# Patient Record
(demographics unavailable — no encounter records)

---

## 2024-10-07 NOTE — REVIEW OF SYSTEMS
[Fatigue] : fatigue [Constipation] : constipation [Diarrhea: Grade 0] : Diarrhea: Grade 0 [Negative] : Allergic/Immunologic [FreeTextEntry7] : improves with fruits [Easy Bruising] : a tendency for easy bruising [Fever] : no fever [Chills] : no chills [Abdominal Pain] : no abdominal pain [Vomiting] : no vomiting

## 2024-10-07 NOTE — HISTORY OF PRESENT ILLNESS
[Disease: _____________________] : Disease: [unfilled] [de-identified] : 5/2024-Patient presented to [facility] with elevated LFTs. At that time, she also noted progressively darkening urine and pale stools for one week along with postprandial epigastric pain. 5/10/2024-Abdominal ultrasound-moderate intrahepatic and extrahepatic biliary dilatation.  Abnormal appearing gallbladder.  Abnormal soft tissue density within the distal portion of the common bile duct and at the ze hepatitis.  The constellation of findings is most concerning for possible gallbladder neoplasm with extension to the ze habitus and associated biliary obstruction.  5/11/2024-CT abdomen/pelvis-gallbladder mass and soft tissue within the ze hepatis with enlarged portal caval node.  Soft tissue involvement of the biliary ducts and vasculature.  No evidence of metastatic disease within the chest. 5/11/2024 MR/MRCP-there is an approximately 2.2 cm obstructing mass centered at the bifurcation of the common hepatic duct with intrahepatic biliary duct dilatation, highly suspicious for cholangiocarcinoma (Klatskin tumor).  Masslike appearance of the gallbladder fundus with cystic changes measuring approximately 3.3 cm, which could represent either masslike adenoma I will mitosis versus a separate gallbladder mass.  No evidence of metastatic disease within the abdomen.  5/14/2024-EUS/ERCP 5/14/2024 (Dr. Dangelo) - proximal biliary mass without involvement of the portal vein, large ze hepatic LAD & gallbladder lesion noted - ERCP notable for hilar stricture s/p sphincterotomy, dilation of the stricture to 6 mm, brushing, biopsy, cholangioscopy & PLASTIC stent placed. *repeat in 3 months for stent removal/exchange if no sx done - cholangioscopy notable for abnormal mucosa of the proximal bile duct w/ narrowing and nodularity w/ decreased vascular pattern s/p spy bite bx ** biopsy of CBD structure c/w carcinoma, favor adeno Common bile duct stricture biopsy-small clusters of atypical cells, detached were within stroma, compatible with carcinoma and favor adenocarcinoma. Bile duct mass biopsy-minute fragments of fibrous tissue with marked crush artifact and rare, atypical cells singly or in small clusters.  5/28/2024-PET/CT scan- FDG-avid soft tissue within the ze hepatis, surrounding common bile duct stent, extending into gallbladder/gallbladder fossa, corresponds to known malignancy. FDG-avid soft tissue nodule in right parotid gland is nonspecific. Differential diagnosis includes benign and malignant salivary gland neoplasms and an intraparotid lymph node. Further evaluation may be performed with ultrasound and percutaneous needle biopsy. Indeterminate FDG-avid focus in fundus of uterus. Pelvic ultrasound/pelvic MRI may be obtained for further evaluation.  6/10/2024-Started neoadjuvant Gemcitabine/Cisplatin/Durvalumab.  Course complicated by biliary stent issues, Klebsiella bacteremia (7/1/2024).  7/2/2024-CT abdomen/pelvis-IMPRESSION: Adequate positioning of the biliary stent, however there is no  pneumobilia to suggest patency. Mild intrahepatic biliary ductal  dilatation. New from 6/4/2024 are ill-defined hypodensities scattered throughout the  bilateral hepatic lobes, suggestive of metastatic disease.  7/3/24-MRI Abdomen-IMPRESSION: 1.  Focal stricture of the common hepatic duct/common biliary duct  measuring 1.6 cm with progressive enhancement, consistent with  cholangiocarcinoma. CBD stent traverse through the focal stricture. Mild  intra and extrahepatic biliary ductal dilatation. 2.  Complex solid and cystic lesion in the gallbladder fossa with areas  of thickened septations/nodular enhancement. 3.  Multiple subcentimeter T2 hyperintense foci throughout the hepatic  parenchyma and few of which in the right hepatic lobe demonstrating  peripheral rim enhancement. Although nonspecific, these are suspicious  for microabscesses.  9/4/2024-MRI Abdomen-Resolution of right hepatic lesions, in keeping with infection. Bile duct mass, with associated common hepatic duct narrowing, without change. Slightly increased CBD dilatation with distal tapering. Unchanged intrahepatic biliary duct dilatation. Fundal gallbladder mass, slightly decreased in size.  9/17/14-Final Diagnosis: SALIVARY GLAND, PAROTID, RIGHT INFERIOR, US GUIDED FNA NON DIAGNOSTIC. Benign salivary gland tissue only Cytology slides show non-neoplastic salivary gland acini with few ductal [de-identified] : Adenocarcinoma [de-identified] : 5/21/2024-CA 19-9=290 [de-identified] : 7/15/2024-[facility] Liquid biopsy-ZHANE, TMB=1 Muts/Mb. ARID1A and TET2 mutations.  No  diagnostic alterations for [employer] liquid CDX were detected. [de-identified] : Feels well overall. Denies chills, fevers.  She stays active.  She saw GI, stents were replaced (every 2-months). Wants to consider getting a mediport due to difficulty with peripheral IVs. No c/o N/V/D, fevers.  Accompanied by

## 2024-10-07 NOTE — ASSESSMENT
[FreeTextEntry1] : Lab work reviewed.  CBC notable for neutropenia-treatment HELD today, 10/7/24 #1) GB carcinoma-T4N1(Stage IIIC) clinically. 5/28/2024-PET/CT scan-. FDG-avid soft tissue within the ze hepatis, surrounding common bile duct stent, extending into gallbladder/gallbladder fossa, corresponds to known malignancy. FDG-avid soft tissue nodule in right parotid gland is nonspecific. Differential diagnosis includes benign and malignant salivary gland neoplasms and an intraparotid lymph node. Further evaluation may be performed with ultrasound and percutaneous needle biopsy. Indeterminate FDG-avid focus in fundus of uterus. Pelvic ultrasound/pelvic MRI may be obtained for further evaluation. -**Requested [facility], PD-L1 on pathology-insufficient tissue. 7/15/2024-[facility] Liquid biopsy-ZHANE, TMB=1 Muts/Mb. ARID1A and TET2 mutations.  No  diagnostic alterations for [employer] liquid CDX were detected. --had reviewed roles of surgery/radiation/systemic therapy in biliary tract cancers.-requires a multidisciplinary approach.  5/30/2024-Had discussed case with surgeon Dr. Allison.  He recommended neoadjuvant systemic therapy with interval follow-up imaging at 2 months, and pending this, again at 4 months for surgical decisions. Pt had recent f/u with Dr. Allison, will discuss role of surgery now vs continuing with systemic treatment with Dr. Gracia  6/10/2024-Started neoadjuvant Gemcitabine/Cisplatin/Durvalumab.  Course complicated by biliary stent issues, Klebsiella bacteremia. Following treatment of infection, resumed treatment.  With creatinine improved, resumed cisplatin.  Needed to further elucidate liver lesions: 7/2/2024-CT abdomen/pelvis-IMPRESSION: Adequate positioning of the biliary stent, however there is no  pneumobilia to suggest patency. Mild intrahepatic biliary ductal  dilatation. New from 6/4/2024 are ill-defined hypodensities scattered throughout the  bilateral hepatic lobes, suggestive of metastatic disease.  7/3/24-MRI Abdomen-IMPRESSION: 1.  Focal stricture of the common hepatic duct/common biliary duct  measuring 1.6 cm with progressive enhancement, consistent with  cholangiocarcinoma. CBD stent traverse through the focal stricture. Mild  intra and extrahepatic biliary ductal dilatation. 2.  Complex solid and cystic lesion in the gallbladder fossa with areas  of thickened septations/nodular enhancement. 3.  Multiple subcentimeter T2 hyperintense foci throughout the hepatic  parenchyma and few of which in the right hepatic lobe demonstrating  peripheral rim enhancement. Although nonspecific, these are suspicious  for microabscesses.  --?Micro abscesses vs. mets 7/18/2024-MRI Abdomen-1.  Several small right hepatic lobe liver lesions are without significant change from 7/3/2024. While favored to be inflammatory from prior cholangitis (improvement on imaging can lag behind clinical improvement), metastatic disease remains possible. Follow-up MRI abdomen recommended in 6-8 weeks. 2.  Bile duct mass and gallbladder mass are without significant change. 3.  Iron deposition within the liver. 9/4/2024-MRI Abdomen-Resolution of right hepatic lesions, in keeping with infection. Bile duct mass, with associated common hepatic duct narrowing, without change. Slightly increased CBD dilatation with distal tapering. Unchanged intrahepatic biliary duct dilatation. Fundal gallbladder mass, slightly decreased in size.  --chemotherapy-induced neutropenia: . HOLD treatment today.   --will plan for 3-days of zarxio injection, first dose today, 10/7-10/9. --reschedule gemzar/cisplatin C6 D8 to Friday, 10/11/24 --will continue with additional cycles (up to cycle #8) of her current regimen and re-evaluate with imaging. She will then follow up with surgical oncologist, Dr. Allison to discuss plans for surgery. --patient aware to have biliary stent changed 1-2 months per GI-last 10/2024.  #2) FDG-avid soft tissue nodule in right parotid gland nonspecific on PET/CT scan.  F/U right parotid US 7/30/2024-The right parotid gland is normal in size and echogenicity. Again noted is 8 x 7 x 5 mm heterogeneous predominantly isoechoic nodularity in the superficial midportion of the parotid gland, unchanged. Findings are nonspecific. Consider follow up ultrasound in 6 months. -s/p right parotid FNA on 9/17/14 with ENT MD-Final Diagnosis:Benign salivary gland tissue only  #3) indeterminate FDG-avid focus in fundus of uterus on PET/CT scan Pelvic US 7/30/2024-Leiomyomatous uterus. Subcentimeter bilateral simple ovarian cyst. Trace fundal endometrial fluid. --states saw GYN MD 1/2024-will do yearly f/u as planned. Discussed again today to f/u with GYN  #4) HCM: -Last mammogram [id number] 2- benign findings-due, following GYN-pt will f/u. Again, discussed today. -Osteoporosis-BDT 5/2023-following endocrinologist-taking Ca+/vitamin D weekly per pt. weight-bearing exercise encouraged.  Patient was given the opportunity to ask questions. Her questions have been answered to her apparent satisfaction at this time. She expressed her understanding and willingness to comply with recommended f/u.  -->Gemcitabine 1000 mg/m IV days 1 and 8, along with Cisplatin 25 mg/m IV days 1 and 8.  Cycle is to be every 21 days. Durvalumab 1500 mg IV day 1 q. 21 days with chemotherapy. Cycle # 6  Day # 8 HELD today. Zarxio injections 10/8, 10/9. RTO on 10/11/24 for C6D8.

## 2024-10-07 NOTE — HISTORY OF PRESENT ILLNESS
[Disease: _____________________] : Disease: [unfilled] [de-identified] : 5/2024-Patient presented to [facility] with elevated LFTs. At that time, she also noted progressively darkening urine and pale stools for one week along with postprandial epigastric pain. 5/10/2024-Abdominal ultrasound-moderate intrahepatic and extrahepatic biliary dilatation.  Abnormal appearing gallbladder.  Abnormal soft tissue density within the distal portion of the common bile duct and at the ze hepatitis.  The constellation of findings is most concerning for possible gallbladder neoplasm with extension to the ze habitus and associated biliary obstruction.  5/11/2024-CT abdomen/pelvis-gallbladder mass and soft tissue within the ze hepatis with enlarged portal caval node.  Soft tissue involvement of the biliary ducts and vasculature.  No evidence of metastatic disease within the chest. 5/11/2024 MR/MRCP-there is an approximately 2.2 cm obstructing mass centered at the bifurcation of the common hepatic duct with intrahepatic biliary duct dilatation, highly suspicious for cholangiocarcinoma (Klatskin tumor).  Masslike appearance of the gallbladder fundus with cystic changes measuring approximately 3.3 cm, which could represent either masslike adenoma I will mitosis versus a separate gallbladder mass.  No evidence of metastatic disease within the abdomen.  5/14/2024-EUS/ERCP 5/14/2024 (Dr. Dangelo) - proximal biliary mass without involvement of the portal vein, large ze hepatic LAD & gallbladder lesion noted - ERCP notable for hilar stricture s/p sphincterotomy, dilation of the stricture to 6 mm, brushing, biopsy, cholangioscopy & PLASTIC stent placed. *repeat in 3 months for stent removal/exchange if no sx done - cholangioscopy notable for abnormal mucosa of the proximal bile duct w/ narrowing and nodularity w/ decreased vascular pattern s/p spy bite bx ** biopsy of CBD structure c/w carcinoma, favor adeno Common bile duct stricture biopsy-small clusters of atypical cells, detached were within stroma, compatible with carcinoma and favor adenocarcinoma. Bile duct mass biopsy-minute fragments of fibrous tissue with marked crush artifact and rare, atypical cells singly or in small clusters.  5/28/2024-PET/CT scan- FDG-avid soft tissue within the ze hepatis, surrounding common bile duct stent, extending into gallbladder/gallbladder fossa, corresponds to known malignancy. FDG-avid soft tissue nodule in right parotid gland is nonspecific. Differential diagnosis includes benign and malignant salivary gland neoplasms and an intraparotid lymph node. Further evaluation may be performed with ultrasound and percutaneous needle biopsy. Indeterminate FDG-avid focus in fundus of uterus. Pelvic ultrasound/pelvic MRI may be obtained for further evaluation.  6/10/2024-Started neoadjuvant Gemcitabine/Cisplatin/Durvalumab.  Course complicated by biliary stent issues, Klebsiella bacteremia (7/1/2024).  7/2/2024-CT abdomen/pelvis-IMPRESSION: Adequate positioning of the biliary stent, however there is no  pneumobilia to suggest patency. Mild intrahepatic biliary ductal  dilatation. New from 6/4/2024 are ill-defined hypodensities scattered throughout the  bilateral hepatic lobes, suggestive of metastatic disease.  7/3/24-MRI Abdomen-IMPRESSION: 1.  Focal stricture of the common hepatic duct/common biliary duct  measuring 1.6 cm with progressive enhancement, consistent with  cholangiocarcinoma. CBD stent traverse through the focal stricture. Mild  intra and extrahepatic biliary ductal dilatation. 2.  Complex solid and cystic lesion in the gallbladder fossa with areas  of thickened septations/nodular enhancement. 3.  Multiple subcentimeter T2 hyperintense foci throughout the hepatic  parenchyma and few of which in the right hepatic lobe demonstrating  peripheral rim enhancement. Although nonspecific, these are suspicious  for microabscesses.  9/4/2024-MRI Abdomen-Resolution of right hepatic lesions, in keeping with infection. Bile duct mass, with associated common hepatic duct narrowing, without change. Slightly increased CBD dilatation with distal tapering. Unchanged intrahepatic biliary duct dilatation. Fundal gallbladder mass, slightly decreased in size.  9/17/14-Final Diagnosis: SALIVARY GLAND, PAROTID, RIGHT INFERIOR, US GUIDED FNA NON DIAGNOSTIC. Benign salivary gland tissue only Cytology slides show non-neoplastic salivary gland acini with few ductal [de-identified] : Adenocarcinoma [de-identified] : 5/21/2024-CA 19-9=290 [de-identified] : 7/15/2024-[employer] Liquid biopsy-ZHANE, TMB=1 Muts/Mb. ARID1A and TET2 mutations.  No  diagnostic alterations for [employer] liquid CDX were detected. [de-identified] : Feels well overall. Denies chills, fevers.  She stays active.  She saw GI, stents were replaced (every 2-months). Wants to consider getting a mediport due to difficulty with peripheral IVs. No c/o N/V/D, fevers.  Accompanied by

## 2024-10-07 NOTE — ASSESSMENT
[FreeTextEntry1] : Lab work reviewed.  CBC notable for neutropenia-treatment HELD today, 10/7/24 #1) GB carcinoma-T4N1(Stage IIIC) clinically. 5/28/2024-PET/CT scan-. FDG-avid soft tissue within the ze hepatis, surrounding common bile duct stent, extending into gallbladder/gallbladder fossa, corresponds to known malignancy. FDG-avid soft tissue nodule in right parotid gland is nonspecific. Differential diagnosis includes benign and malignant salivary gland neoplasms and an intraparotid lymph node. Further evaluation may be performed with ultrasound and percutaneous needle biopsy. Indeterminate FDG-avid focus in fundus of uterus. Pelvic ultrasound/pelvic MRI may be obtained for further evaluation. -**Requested [employer], PD-L1 on pathology-insufficient tissue. 7/15/2024-[facility] Liquid biopsy-ZHANE, TMB=1 Muts/Mb. ARID1A and TET2 mutations.  No  diagnostic alterations for [employer] liquid CDX were detected. --had reviewed roles of surgery/radiation/systemic therapy in biliary tract cancers.-requires a multidisciplinary approach.  5/30/2024-Had discussed case with surgeon Dr. Allison.  He recommended neoadjuvant systemic therapy with interval follow-up imaging at 2 months, and pending this, again at 4 months for surgical decisions. Pt had recent f/u with Dr. Allison, will discuss role of surgery now vs continuing with systemic treatment with Dr. Gracia  6/10/2024-Started neoadjuvant Gemcitabine/Cisplatin/Durvalumab.  Course complicated by biliary stent issues, Klebsiella bacteremia. Following treatment of infection, resumed treatment.  With creatinine improved, resumed cisplatin.  Needed to further elucidate liver lesions: 7/2/2024-CT abdomen/pelvis-IMPRESSION: Adequate positioning of the biliary stent, however there is no  pneumobilia to suggest patency. Mild intrahepatic biliary ductal  dilatation. New from 6/4/2024 are ill-defined hypodensities scattered throughout the  bilateral hepatic lobes, suggestive of metastatic disease.  7/3/24-MRI Abdomen-IMPRESSION: 1.  Focal stricture of the common hepatic duct/common biliary duct  measuring 1.6 cm with progressive enhancement, consistent with  cholangiocarcinoma. CBD stent traverse through the focal stricture. Mild  intra and extrahepatic biliary ductal dilatation. 2.  Complex solid and cystic lesion in the gallbladder fossa with areas  of thickened septations/nodular enhancement. 3.  Multiple subcentimeter T2 hyperintense foci throughout the hepatic  parenchyma and few of which in the right hepatic lobe demonstrating  peripheral rim enhancement. Although nonspecific, these are suspicious  for microabscesses.  --?Micro abscesses vs. mets 7/18/2024-MRI Abdomen-1.  Several small right hepatic lobe liver lesions are without significant change from 7/3/2024. While favored to be inflammatory from prior cholangitis (improvement on imaging can lag behind clinical improvement), metastatic disease remains possible. Follow-up MRI abdomen recommended in 6-8 weeks. 2.  Bile duct mass and gallbladder mass are without significant change. 3.  Iron deposition within the liver. 9/4/2024-MRI Abdomen-Resolution of right hepatic lesions, in keeping with infection. Bile duct mass, with associated common hepatic duct narrowing, without change. Slightly increased CBD dilatation with distal tapering. Unchanged intrahepatic biliary duct dilatation. Fundal gallbladder mass, slightly decreased in size.  --chemotherapy-induced neutropenia: . HOLD treatment today.   --will plan for 3-days of zarxio injection, first dose today, 10/7-10/9. --reschedule gemzar/cisplatin C6 D8 to Friday, 10/11/24 --will continue with additional cycles (up to cycle #8) of her current regimen and re-evaluate with imaging. She will then follow up with surgical oncologist, Dr. Allison to discuss plans for surgery. --patient aware to have biliary stent changed 1-2 months per GI-last 10/2024.  #2) FDG-avid soft tissue nodule in right parotid gland nonspecific on PET/CT scan.  F/U right parotid US 7/30/2024-The right parotid gland is normal in size and echogenicity. Again noted is 8 x 7 x 5 mm heterogeneous predominantly isoechoic nodularity in the superficial midportion of the parotid gland, unchanged. Findings are nonspecific. Consider follow up ultrasound in 6 months. -s/p right parotid FNA on 9/17/14 with ENT MD-Final Diagnosis:Benign salivary gland tissue only  #3) indeterminate FDG-avid focus in fundus of uterus on PET/CT scan Pelvic US 7/30/2024-Leiomyomatous uterus. Subcentimeter bilateral simple ovarian cyst. Trace fundal endometrial fluid. --states saw GYN MD 1/2024-will do yearly f/u as planned. Discussed again today to f/u with GYN  #4) HCM: -Last mammogram [id number] 2- benign findings-due, following GYN-pt will f/u. Again, discussed today. -Osteoporosis-BDT 5/2023-following endocrinologist-taking Ca+/vitamin D weekly per pt. weight-bearing exercise encouraged.  Patient was given the opportunity to ask questions. Her questions have been answered to her apparent satisfaction at this time. She expressed her understanding and willingness to comply with recommended f/u.  -->Gemcitabine 1000 mg/m IV days 1 and 8, along with Cisplatin 25 mg/m IV days 1 and 8.  Cycle is to be every 21 days. Durvalumab 1500 mg IV day 1 q. 21 days with chemotherapy. Cycle # 6  Day # 8 HELD today. Zarxio injections 10/8, 10/9. RTO on 10/11/24 for C6D8.

## 2024-10-11 NOTE — ASSESSMENT
[FreeTextEntry1] : Lab work reviewed: 10/11/24-discussed in detail with patient.  #1) GB carcinoma-T4N1(Stage IIIC) clinically. 5/28/2024-PET/CT scan-. FDG-avid soft tissue within the ze hepatis, surrounding common bile duct stent, extending into gallbladder/gallbladder fossa, corresponds to known malignancy. FDG-avid soft tissue nodule in right parotid gland is nonspecific. Differential diagnosis includes benign and malignant salivary gland neoplasms and an intraparotid lymph node. Further evaluation may be performed with ultrasound and percutaneous needle biopsy. Indeterminate FDG-avid focus in fundus of uterus. Pelvic ultrasound/pelvic MRI may be obtained for further evaluation. -**Requested [facility], PD-L1 on pathology-insufficient tissue. 7/15/2024-[facility] Liquid biopsy-ZHANE, TMB=1 Muts/Mb. ARID1A and TET2 mutations.  No  diagnostic alterations for [facility] liquid CDX were detected. --had reviewed roles of surgery/radiation/systemic therapy in biliary tract cancers.-requires a multidisciplinary approach.  5/30/2024-Had discussed case with surgeon Dr. Allison.  He recommended neoadjuvant systemic therapy with interval follow-up imaging at 2 months, and pending this, again at 4 months for surgical decisions. Pt had recent f/u with Dr. Allison, will discuss role of surgery now vs continuing with systemic treatment with Dr. Gracia  6/10/2024-Started neoadjuvant Gemcitabine/Cisplatin/Durvalumab.  Course complicated by biliary stent issues, Klebsiella bacteremia. Following treatment of infection, resumed treatment.  With creatinine improved, resumed cisplatin.  Needed to further elucidate liver lesions: 7/2/2024-CT abdomen/pelvis-IMPRESSION: Adequate positioning of the biliary stent, however there is no  pneumobilia to suggest patency. Mild intrahepatic biliary ductal  dilatation. New from 6/4/2024 are ill-defined hypodensities scattered throughout the  bilateral hepatic lobes, suggestive of metastatic disease.  7/3/24-MRI Abdomen-IMPRESSION: 1.  Focal stricture of the common hepatic duct/common biliary duct  measuring 1.6 cm with progressive enhancement, consistent with  cholangiocarcinoma. CBD stent traverse through the focal stricture. Mild  intra and extrahepatic biliary ductal dilatation. 2.  Complex solid and cystic lesion in the gallbladder fossa with areas  of thickened septations/nodular enhancement. 3.  Multiple subcentimeter T2 hyperintense foci throughout the hepatic  parenchyma and few of which in the right hepatic lobe demonstrating  peripheral rim enhancement. Although nonspecific, these are suspicious  for microabscesses.  --?Micro abscesses vs. mets 7/18/2024-MRI Abdomen-1.  Several small right hepatic lobe liver lesions are without significant change from 7/3/2024. While favored to be inflammatory from prior cholangitis (improvement on imaging can lag behind clinical improvement), metastatic disease remains possible. Follow-up MRI abdomen recommended in 6-8 weeks. 2.  Bile duct mass and gallbladder mass are without significant change. 3.  Iron deposition within the liver. 9/4/2024-MRI Abdomen-Resolution of right hepatic lesions, in keeping with infection. Bile duct mass, with associated common hepatic duct narrowing, without change. Slightly increased CBD dilatation with distal tapering. Unchanged intrahepatic biliary duct dilatation. Fundal gallbladder mass, slightly decreased in size.  --chemotherapy-induced neutropenia: improved. s/ 3-days of zarxio injection. continue with zarxio injection PRN.  --thrombocytopenia: due for gemzar/cisplatin C6 D8-HOLD today. Defer mediport placement for now.  --will continue with additional cycles (up to cycle #8) of her current regimen and re-evaluate with imaging. She will then follow up with surgical oncologist, Dr. Allison to discuss plans for surgery. --patient aware to have biliary stent changed 1-2 months per GI-last 10/2024.  #2) FDG-avid soft tissue nodule in right parotid gland nonspecific on PET/CT scan.  F/U right parotid US 7/30/2024-The right parotid gland is normal in size and echogenicity. Again noted is 8 x 7 x 5 mm heterogeneous predominantly isoechoic nodularity in the superficial midportion of the parotid gland, unchanged. Findings are nonspecific. Consider follow up ultrasound in 6 months. -s/p right parotid FNA on 9/17/14 with ENT MD-Final Diagnosis:Benign salivary gland tissue only  #3) indeterminate FDG-avid focus in fundus of uterus on PET/CT scan Pelvic US 7/30/2024-Leiomyomatous uterus. Subcentimeter bilateral simple ovarian cyst. Trace fundal endometrial fluid. --states saw GYN MD 1/2024-will do yearly f/u as planned. Discussed again today to f/u with GYN  #4) HCM: -Last mammogram [id number] 2- benign findings-due, following GYN-pt will f/u. Again, discussed today. -Osteoporosis-BDT 5/2023-following endocrinologist-taking Ca+/vitamin D weekly per pt. weight-bearing exercise encouraged.  Patient was given the opportunity to ask questions. Her questions have been answered to her apparent satisfaction at this time. She expressed her understanding and willingness to comply with recommended f/u.  -->Gemcitabine 1000 mg/m IV days 1 and 8, along with Cisplatin 25 mg/m IV days 1 and 8.  Cycle is to be every 21 days. Durvalumab 1500 mg IV day 1 q. 21 days with chemotherapy. Cycle # 6  Day # 8 HELD today. RTO in 2-weeks for C7D1, zarxio injections prn.

## 2024-10-11 NOTE — END OF VISIT
Provider E-Visit time total (minutes): 5  
[Time Spent: ___ minutes] : I have spent [unfilled] minutes of time on the encounter which excludes teaching and separately reported services.

## 2024-10-11 NOTE — HISTORY OF PRESENT ILLNESS
[Disease: _____________________] : Disease: [unfilled] [de-identified] : 5/2024-Patient presented to [facility] with elevated LFTs. At that time, she also noted progressively darkening urine and pale stools for one week along with postprandial epigastric pain. 5/10/2024-Abdominal ultrasound-moderate intrahepatic and extrahepatic biliary dilatation.  Abnormal appearing gallbladder.  Abnormal soft tissue density within the distal portion of the common bile duct and at the ze hepatitis.  The constellation of findings is most concerning for possible gallbladder neoplasm with extension to the ze habitus and associated biliary obstruction.  5/11/2024-CT abdomen/pelvis-gallbladder mass and soft tissue within the ze hepatis with enlarged portal caval node.  Soft tissue involvement of the biliary ducts and vasculature.  No evidence of metastatic disease within the chest. 5/11/2024 MR/MRCP-there is an approximately 2.2 cm obstructing mass centered at the bifurcation of the common hepatic duct with intrahepatic biliary duct dilatation, highly suspicious for cholangiocarcinoma (Klatskin tumor).  Masslike appearance of the gallbladder fundus with cystic changes measuring approximately 3.3 cm, which could represent either masslike adenoma I will mitosis versus a separate gallbladder mass.  No evidence of metastatic disease within the abdomen.  5/14/2024-EUS/ERCP 5/14/2024 (Dr. Dangelo) - proximal biliary mass without involvement of the portal vein, large ze hepatic LAD & gallbladder lesion noted - ERCP notable for hilar stricture s/p sphincterotomy, dilation of the stricture to 6 mm, brushing, biopsy, cholangioscopy & PLASTIC stent placed. *repeat in 3 months for stent removal/exchange if no sx done - cholangioscopy notable for abnormal mucosa of the proximal bile duct w/ narrowing and nodularity w/ decreased vascular pattern s/p spy bite bx ** biopsy of CBD structure c/w carcinoma, favor adeno Common bile duct stricture biopsy-small clusters of atypical cells, detached were within stroma, compatible with carcinoma and favor adenocarcinoma. Bile duct mass biopsy-minute fragments of fibrous tissue with marked crush artifact and rare, atypical cells singly or in small clusters.  5/28/2024-PET/CT scan- FDG-avid soft tissue within the ze hepatis, surrounding common bile duct stent, extending into gallbladder/gallbladder fossa, corresponds to known malignancy. FDG-avid soft tissue nodule in right parotid gland is nonspecific. Differential diagnosis includes benign and malignant salivary gland neoplasms and an intraparotid lymph node. Further evaluation may be performed with ultrasound and percutaneous needle biopsy. Indeterminate FDG-avid focus in fundus of uterus. Pelvic ultrasound/pelvic MRI may be obtained for further evaluation.  6/10/2024-Started neoadjuvant Gemcitabine/Cisplatin/Durvalumab.  Course complicated by biliary stent issues, Klebsiella bacteremia (7/1/2024).  7/2/2024-CT abdomen/pelvis-IMPRESSION: Adequate positioning of the biliary stent, however there is no  pneumobilia to suggest patency. Mild intrahepatic biliary ductal  dilatation. New from 6/4/2024 are ill-defined hypodensities scattered throughout the  bilateral hepatic lobes, suggestive of metastatic disease.  7/3/24-MRI Abdomen-IMPRESSION: 1.  Focal stricture of the common hepatic duct/common biliary duct  measuring 1.6 cm with progressive enhancement, consistent with  cholangiocarcinoma. CBD stent traverse through the focal stricture. Mild  intra and extrahepatic biliary ductal dilatation. 2.  Complex solid and cystic lesion in the gallbladder fossa with areas  of thickened septations/nodular enhancement. 3.  Multiple subcentimeter T2 hyperintense foci throughout the hepatic  parenchyma and few of which in the right hepatic lobe demonstrating  peripheral rim enhancement. Although nonspecific, these are suspicious  for microabscesses.  9/4/2024-MRI Abdomen-Resolution of right hepatic lesions, in keeping with infection. Bile duct mass, with associated common hepatic duct narrowing, without change. Slightly increased CBD dilatation with distal tapering. Unchanged intrahepatic biliary duct dilatation. Fundal gallbladder mass, slightly decreased in size.  9/17/14-Final Diagnosis: SALIVARY GLAND, PAROTID, RIGHT INFERIOR, US GUIDED FNA NON DIAGNOSTIC. Benign salivary gland tissue only Cytology slides show non-neoplastic salivary gland acini with few ductal [de-identified] : Adenocarcinoma [de-identified] : 5/21/2024-CA 19-9=290 [de-identified] : 7/15/2024-[facility] Liquid biopsy-ZHANE, TMB=1 Muts/Mb. ARID1A and TET2 mutations.  No  diagnostic alterations for [employer] liquid CDX were detected. [de-identified] : C6D8 held on Monday due to neutropenia. S/p 3 doses of zarxio injections. Feels well overall and remains active.  Wants to consider getting a mediport due to difficulty with peripheral IVs. +brusing. Denies chills, fevers, night sweats, vomiting, constipation, diarrhea, bleeding.  Accompanied by

## 2024-10-11 NOTE — REVIEW OF SYSTEMS
[Diarrhea: Grade 0] : Diarrhea: Grade 0 [Easy Bruising] : a tendency for easy bruising [Negative] : Allergic/Immunologic [Fever] : no fever [Chills] : no chills [Night Sweats] : no night sweats [Fatigue] : no fatigue [Abdominal Pain] : no abdominal pain [Vomiting] : no vomiting [Easy Bleeding] : no tendency for easy bleeding

## 2024-10-21 NOTE — REASON FOR VISIT
[Initial Consultation] : an initial consultation [Spouse] : spouse [FreeTextEntry2] : GB/cholangiocarcinoma

## 2024-10-21 NOTE — HISTORY OF PRESENT ILLNESS
[Disease: _____________________] : Disease: [unfilled] [de-identified] : 5/2024-Patient presented to [facility] with elevated LFTs. At that time, she also noted progressively darkening urine and pale stools for one week along with postprandial epigastric pain. 5/10/2024-Abdominal ultrasound-moderate intrahepatic and extrahepatic biliary dilatation.  Abnormal appearing gallbladder.  Abnormal soft tissue density within the distal portion of the common bile duct and at the ze hepatitis.  The constellation of findings is most concerning for possible gallbladder neoplasm with extension to the ze habitus and associated biliary obstruction.  5/11/2024-CT abdomen/pelvis-gallbladder mass and soft tissue within the ze hepatis with enlarged portal caval node.  Soft tissue involvement of the biliary ducts and vasculature.  No evidence of metastatic disease within the chest. 5/11/2024 MR/MRCP-there is an approximately 2.2 cm obstructing mass centered at the bifurcation of the common hepatic duct with intrahepatic biliary duct dilatation, highly suspicious for cholangiocarcinoma (Klatskin tumor).  Masslike appearance of the gallbladder fundus with cystic changes measuring approximately 3.3 cm, which could represent either masslike adenoma I will mitosis versus a separate gallbladder mass.  No evidence of metastatic disease within the abdomen.  5/14/2024-EUS/ERCP 5/14/2024 (Dr. Dangelo) - proximal biliary mass without involvement of the portal vein, large ze hepatic LAD & gallbladder lesion noted - ERCP notable for hilar stricture s/p sphincterotomy, dilation of the stricture to 6 mm, brushing, biopsy, cholangioscopy & PLASTIC stent placed. *repeat in 3 months for stent removal/exchange if no sx done - cholangioscopy notable for abnormal mucosa of the proximal bile duct w/ narrowing and nodularity w/ decreased vascular pattern s/p spy bite bx ** biopsy of CBD structure c/w carcinoma, favor adeno Common bile duct stricture biopsy-small clusters of atypical cells, detached were within stroma, compatible with carcinoma and favor adenocarcinoma. Bile duct mass biopsy-minute fragments of fibrous tissue with marked crush artifact and rare, atypical cells singly or in small clusters.  5/28/2024-PET/CT scan- FDG-avid soft tissue within the ze hepatis, surrounding common bile duct stent, extending into gallbladder/gallbladder fossa, corresponds to known malignancy. FDG-avid soft tissue nodule in right parotid gland is nonspecific. Differential diagnosis includes benign and malignant salivary gland neoplasms and an intraparotid lymph node. Further evaluation may be performed with ultrasound and percutaneous needle biopsy. Indeterminate FDG-avid focus in fundus of uterus. Pelvic ultrasound/pelvic MRI may be obtained for further evaluation.  6/10/2024-Started neoadjuvant Gemcitabine/Cisplatin/Durvalumab.  Course complicated by biliary stent issues, Klebsiella bacteremia (7/1/2024).  7/2/2024-CT abdomen/pelvis-IMPRESSION: Adequate positioning of the biliary stent, however there is no  pneumobilia to suggest patency. Mild intrahepatic biliary ductal  dilatation. New from 6/4/2024 are ill-defined hypodensities scattered throughout the  bilateral hepatic lobes, suggestive of metastatic disease.  7/3/24-MRI Abdomen-IMPRESSION: 1.  Focal stricture of the common hepatic duct/common biliary duct  measuring 1.6 cm with progressive enhancement, consistent with  cholangiocarcinoma. CBD stent traverse through the focal stricture. Mild  intra and extrahepatic biliary ductal dilatation. 2.  Complex solid and cystic lesion in the gallbladder fossa with areas  of thickened septations/nodular enhancement. 3.  Multiple subcentimeter T2 hyperintense foci throughout the hepatic  parenchyma and few of which in the right hepatic lobe demonstrating  peripheral rim enhancement. Although nonspecific, these are suspicious  for microabscesses.  9/4/2024-MRI Abdomen-Resolution of right hepatic lesions, in keeping with infection. Bile duct mass, with associated common hepatic duct narrowing, without change. Slightly increased CBD dilatation with distal tapering. Unchanged intrahepatic biliary duct dilatation. Fundal gallbladder mass, slightly decreased in size.  [de-identified] : Adenocarcinoma [de-identified] : 5/21/2024-CA 19-9=290 [de-identified] : 7/15/2024-[facility] Liquid biopsy-ZHANE, TMB=1 Muts/Mb. ARID1A and TET2 mutations.  No  diagnostic alterations for [facility] liquid CDX were detected. [de-identified] : Feels generally well. No c/o N/V/D, fevers. No c/o abdominal pain.

## 2024-10-21 NOTE — ASSESSMENT
[FreeTextEntry1] : Lab work , 9/30/24 surgery note, 9/30/24 ENT MD note reviewed. #1) GB carcinoma-T4N1(Stage IIIC) clinically. 5/28/2024-PET/CT scan-. FDG-avid soft tissue within the ze hepatis, surrounding common bile duct stent, extending into gallbladder/gallbladder fossa, corresponds to known malignancy. FDG-avid soft tissue nodule in right parotid gland is nonspecific. Differential diagnosis includes benign and malignant salivary gland neoplasms and an intraparotid lymph node. Further evaluation may be performed with ultrasound and percutaneous needle biopsy. Indeterminate FDG-avid focus in fundus of uterus. Pelvic ultrasound/pelvic MRI may be obtained for further evaluation. -**Requested [facility], PD-L1 on pathology-insufficient tissue. 7/15/2024-[facility] Liquid biopsy-ZHANE, TMB=1 Muts/Mb. ARID1A and TET2 mutations.  No  diagnostic alterations for [facility] liquid CDX were detected. --had reviewed roles of surgery/radiation/systemic therapy in biliary tract cancers.-requires a multidisciplinary approach.  5/30/2024-Had discussed case with surgeon Dr. Allison.  He recommended neoadjuvant systemic therapy with interval follow-up imaging at 2 months, and pending this, again at 4 months for surgical decisions.  6/10/2024-Started neoadjuvant Gemcitabine/Cisplatin/Durvalumab.  Course complicated by biliary stent issues, Klebsiella bacteremia. Following treatment of infection, resumed treatment.  With creatinine improved, resumed cisplatin.  Needed to further elucidate liver lesions: 7/2/2024-CT abdomen/pelvis-IMPRESSION: Adequate positioning of the biliary stent, however there is no  pneumobilia to suggest patency. Mild intrahepatic biliary ductal  dilatation. New from 6/4/2024 are ill-defined hypodensities scattered throughout the  bilateral hepatic lobes, suggestive of metastatic disease.  7/3/24-MRI Abdomen-IMPRESSION: 1.  Focal stricture of the common hepatic duct/common biliary duct  measuring 1.6 cm with progressive enhancement, consistent with  cholangiocarcinoma. CBD stent traverse through the focal stricture. Mild  intra and extrahepatic biliary ductal dilatation. 2.  Complex solid and cystic lesion in the gallbladder fossa with areas  of thickened septations/nodular enhancement. 3.  Multiple subcentimeter T2 hyperintense foci throughout the hepatic  parenchyma and few of which in the right hepatic lobe demonstrating  peripheral rim enhancement. Although nonspecific, these are suspicious  for microabscesses.  --?Micro abscesses vs. mets 7/18/2024-MRI Abdomen-1.  Several small right hepatic lobe liver lesions are without significant change from 7/3/2024. While favored to be inflammatory from prior cholangitis (improvement on imaging can lag behind clinical improvement), metastatic disease remains possible. Follow-up MRI abdomen recommended in 6-8 weeks. 2.  Bile duct mass and gallbladder mass are without significant change. 3.  Iron deposition within the liver. 9/4/2024-MRI Abdomen-Resolution of right hepatic lesions, in keeping with infection. Bile duct mass, with associated common hepatic duct narrowing, without change. Slightly increased CBD dilatation with distal tapering. Unchanged intrahepatic biliary duct dilatation. Fundal gallbladder mass, slightly decreased in size.  --clinically stable for treatment today to which patient consents --CT A/P ordered by surgeon-pending --patient aware to have biliary stent changed 1-2 months per GI-last 8/20/2024.  #2) FDG-avid soft tissue nodule in right parotid gland nonspecific on PET/CT scan.  F/U right parotid US 7/30/2024-The right parotid gland is normal in size and echogenicity. Again noted is 8 x 7 x 5 mm heterogeneous predominantly isoechoic nodularity in the superficial midportion of the parotid gland, unchanged. Findings are nonspecific. Consider follow up ultrasound in 6 months. 9/30/2024-SALIVARY GLAND, PAROTID, RIGHT INFERIOR, US GUIDED FNA NON DIAGNOSTIC. Benign salivary gland tissue only ---to continue f/u with ENT MD   #3) indeterminate FDG-avid focus in fundus of uterus on PET/CT scan Pelvic US 7/30/2024-Leiomyomatous uterus. Subcentimeter bilateral simple ovarian cyst. Trace fundal endometrial fluid. --stated saw GYN MD 1/2024-will do yearly f/u as planned  Patient was given the opportunity to ask questions. Her questions have been answered to her apparent satisfaction at this time. She expressed her understanding and willingness to comply with recommended f/u.  -->Gemcitabine 1000 mg/m IV days 1 and 8, along with Cisplatin 25 mg/m IV days 1 and 8.  Cycle is to be every 21 days. Durvalumab 1500 mg IV day 1 q. 21 days with chemotherapy. Cycle #7  Day #  1 chemo today. RTO  1  week. Zarxio x 2 days post chemo.

## 2024-10-21 NOTE — HISTORY OF PRESENT ILLNESS
[Disease: _____________________] : Disease: [unfilled] [de-identified] : 5/2024-Patient presented to [facility] with elevated LFTs. At that time, she also noted progressively darkening urine and pale stools for one week along with postprandial epigastric pain. 5/10/2024-Abdominal ultrasound-moderate intrahepatic and extrahepatic biliary dilatation.  Abnormal appearing gallbladder.  Abnormal soft tissue density within the distal portion of the common bile duct and at the ze hepatitis.  The constellation of findings is most concerning for possible gallbladder neoplasm with extension to the ze habitus and associated biliary obstruction.  5/11/2024-CT abdomen/pelvis-gallbladder mass and soft tissue within the ze hepatis with enlarged portal caval node.  Soft tissue involvement of the biliary ducts and vasculature.  No evidence of metastatic disease within the chest. 5/11/2024 MR/MRCP-there is an approximately 2.2 cm obstructing mass centered at the bifurcation of the common hepatic duct with intrahepatic biliary duct dilatation, highly suspicious for cholangiocarcinoma (Klatskin tumor).  Masslike appearance of the gallbladder fundus with cystic changes measuring approximately 3.3 cm, which could represent either masslike adenoma I will mitosis versus a separate gallbladder mass.  No evidence of metastatic disease within the abdomen.  5/14/2024-EUS/ERCP 5/14/2024 (Dr. Dangelo) - proximal biliary mass without involvement of the portal vein, large ze hepatic LAD & gallbladder lesion noted - ERCP notable for hilar stricture s/p sphincterotomy, dilation of the stricture to 6 mm, brushing, biopsy, cholangioscopy & PLASTIC stent placed. *repeat in 3 months for stent removal/exchange if no sx done - cholangioscopy notable for abnormal mucosa of the proximal bile duct w/ narrowing and nodularity w/ decreased vascular pattern s/p spy bite bx ** biopsy of CBD structure c/w carcinoma, favor adeno Common bile duct stricture biopsy-small clusters of atypical cells, detached were within stroma, compatible with carcinoma and favor adenocarcinoma. Bile duct mass biopsy-minute fragments of fibrous tissue with marked crush artifact and rare, atypical cells singly or in small clusters.  5/28/2024-PET/CT scan- FDG-avid soft tissue within the ze hepatis, surrounding common bile duct stent, extending into gallbladder/gallbladder fossa, corresponds to known malignancy. FDG-avid soft tissue nodule in right parotid gland is nonspecific. Differential diagnosis includes benign and malignant salivary gland neoplasms and an intraparotid lymph node. Further evaluation may be performed with ultrasound and percutaneous needle biopsy. Indeterminate FDG-avid focus in fundus of uterus. Pelvic ultrasound/pelvic MRI may be obtained for further evaluation.  6/10/2024-Started neoadjuvant Gemcitabine/Cisplatin/Durvalumab.  Course complicated by biliary stent issues, Klebsiella bacteremia (7/1/2024).  7/2/2024-CT abdomen/pelvis-IMPRESSION: Adequate positioning of the biliary stent, however there is no  pneumobilia to suggest patency. Mild intrahepatic biliary ductal  dilatation. New from 6/4/2024 are ill-defined hypodensities scattered throughout the  bilateral hepatic lobes, suggestive of metastatic disease.  7/3/24-MRI Abdomen-IMPRESSION: 1.  Focal stricture of the common hepatic duct/common biliary duct  measuring 1.6 cm with progressive enhancement, consistent with  cholangiocarcinoma. CBD stent traverse through the focal stricture. Mild  intra and extrahepatic biliary ductal dilatation. 2.  Complex solid and cystic lesion in the gallbladder fossa with areas  of thickened septations/nodular enhancement. 3.  Multiple subcentimeter T2 hyperintense foci throughout the hepatic  parenchyma and few of which in the right hepatic lobe demonstrating  peripheral rim enhancement. Although nonspecific, these are suspicious  for microabscesses.  9/4/2024-MRI Abdomen-Resolution of right hepatic lesions, in keeping with infection. Bile duct mass, with associated common hepatic duct narrowing, without change. Slightly increased CBD dilatation with distal tapering. Unchanged intrahepatic biliary duct dilatation. Fundal gallbladder mass, slightly decreased in size.  [de-identified] : Adenocarcinoma [de-identified] : 5/21/2024-CA 19-9=290 [de-identified] : 7/15/2024-[facility] Liquid biopsy-ZHANE, TMB=1 Muts/Mb. ARID1A and TET2 mutations.  No  diagnostic alterations for [employer] liquid CDX were detected. [de-identified] : Feels generally well. No c/o N/V/D, fevers. No c/o abdominal pain.

## 2024-10-28 NOTE — HISTORY OF PRESENT ILLNESS
[Disease: _____________________] : Disease: [unfilled] [de-identified] : 5/2024-Patient presented to [facility] with elevated LFTs. At that time, she also noted progressively darkening urine and pale stools for one week along with postprandial epigastric pain. 5/10/2024-Abdominal ultrasound-moderate intrahepatic and extrahepatic biliary dilatation.  Abnormal appearing gallbladder.  Abnormal soft tissue density within the distal portion of the common bile duct and at the ze hepatitis.  The constellation of findings is most concerning for possible gallbladder neoplasm with extension to the ze habitus and associated biliary obstruction.  5/11/2024-CT abdomen/pelvis-gallbladder mass and soft tissue within the ze hepatis with enlarged portal caval node.  Soft tissue involvement of the biliary ducts and vasculature.  No evidence of metastatic disease within the chest. 5/11/2024 MR/MRCP-there is an approximately 2.2 cm obstructing mass centered at the bifurcation of the common hepatic duct with intrahepatic biliary duct dilatation, highly suspicious for cholangiocarcinoma (Klatskin tumor).  Masslike appearance of the gallbladder fundus with cystic changes measuring approximately 3.3 cm, which could represent either masslike adenoma I will mitosis versus a separate gallbladder mass.  No evidence of metastatic disease within the abdomen.  5/14/2024-EUS/ERCP 5/14/2024 (Dr. Dangelo) - proximal biliary mass without involvement of the portal vein, large ze hepatic LAD & gallbladder lesion noted - ERCP notable for hilar stricture s/p sphincterotomy, dilation of the stricture to 6 mm, brushing, biopsy, cholangioscopy & PLASTIC stent placed. *repeat in 3 months for stent removal/exchange if no sx done - cholangioscopy notable for abnormal mucosa of the proximal bile duct w/ narrowing and nodularity w/ decreased vascular pattern s/p spy bite bx ** biopsy of CBD structure c/w carcinoma, favor adeno Common bile duct stricture biopsy-small clusters of atypical cells, detached were within stroma, compatible with carcinoma and favor adenocarcinoma. Bile duct mass biopsy-minute fragments of fibrous tissue with marked crush artifact and rare, atypical cells singly or in small clusters.  5/28/2024-PET/CT scan- FDG-avid soft tissue within the ze hepatis, surrounding common bile duct stent, extending into gallbladder/gallbladder fossa, corresponds to known malignancy. FDG-avid soft tissue nodule in right parotid gland is nonspecific. Differential diagnosis includes benign and malignant salivary gland neoplasms and an intraparotid lymph node. Further evaluation may be performed with ultrasound and percutaneous needle biopsy. Indeterminate FDG-avid focus in fundus of uterus. Pelvic ultrasound/pelvic MRI may be obtained for further evaluation.  6/10/2024-Started neoadjuvant Gemcitabine/Cisplatin/Durvalumab.  Course complicated by biliary stent issues, Klebsiella bacteremia (7/1/2024).  7/2/2024-CT abdomen/pelvis-IMPRESSION: Adequate positioning of the biliary stent, however there is no  pneumobilia to suggest patency. Mild intrahepatic biliary ductal  dilatation. New from 6/4/2024 are ill-defined hypodensities scattered throughout the  bilateral hepatic lobes, suggestive of metastatic disease.  7/3/24-MRI Abdomen-IMPRESSION: 1.  Focal stricture of the common hepatic duct/common biliary duct  measuring 1.6 cm with progressive enhancement, consistent with  cholangiocarcinoma. CBD stent traverse through the focal stricture. Mild  intra and extrahepatic biliary ductal dilatation. 2.  Complex solid and cystic lesion in the gallbladder fossa with areas  of thickened septations/nodular enhancement. 3.  Multiple subcentimeter T2 hyperintense foci throughout the hepatic  parenchyma and few of which in the right hepatic lobe demonstrating  peripheral rim enhancement. Although nonspecific, these are suspicious  for microabscesses.  9/4/2024-MRI Abdomen-Resolution of right hepatic lesions, in keeping with infection. Bile duct mass, with associated common hepatic duct narrowing, without change. Slightly increased CBD dilatation with distal tapering. Unchanged intrahepatic biliary duct dilatation. Fundal gallbladder mass, slightly decreased in size.  [de-identified] : Adenocarcinoma [de-identified] : 5/21/2024-CA 19-9=290 [de-identified] : 7/15/2024-[facility] Liquid biopsy-ZHANE, TMB=1 Muts/Mb. ARID1A and TET2 mutations.  No  diagnostic alterations for [employer] liquid CDX were detected. [de-identified] : Some insomnia related-to urinary frequency, otherwise feels well. Requesting referral for GYN as her GYN is on on maternity leave. No c/o N/V/D, fevers, dysuria, lower back pain. No c/o abdominal pain.   present

## 2024-10-28 NOTE — HISTORY OF PRESENT ILLNESS
[Disease: _____________________] : Disease: [unfilled] [de-identified] : 5/2024-Patient presented to [facility] with elevated LFTs. At that time, she also noted progressively darkening urine and pale stools for one week along with postprandial epigastric pain. 5/10/2024-Abdominal ultrasound-moderate intrahepatic and extrahepatic biliary dilatation.  Abnormal appearing gallbladder.  Abnormal soft tissue density within the distal portion of the common bile duct and at the ze hepatitis.  The constellation of findings is most concerning for possible gallbladder neoplasm with extension to the ze habitus and associated biliary obstruction.  5/11/2024-CT abdomen/pelvis-gallbladder mass and soft tissue within the ze hepatis with enlarged portal caval node.  Soft tissue involvement of the biliary ducts and vasculature.  No evidence of metastatic disease within the chest. 5/11/2024 MR/MRCP-there is an approximately 2.2 cm obstructing mass centered at the bifurcation of the common hepatic duct with intrahepatic biliary duct dilatation, highly suspicious for cholangiocarcinoma (Klatskin tumor).  Masslike appearance of the gallbladder fundus with cystic changes measuring approximately 3.3 cm, which could represent either masslike adenoma I will mitosis versus a separate gallbladder mass.  No evidence of metastatic disease within the abdomen.  5/14/2024-EUS/ERCP 5/14/2024 (Dr. Dangelo) - proximal biliary mass without involvement of the portal vein, large ze hepatic LAD & gallbladder lesion noted - ERCP notable for hilar stricture s/p sphincterotomy, dilation of the stricture to 6 mm, brushing, biopsy, cholangioscopy & PLASTIC stent placed. *repeat in 3 months for stent removal/exchange if no sx done - cholangioscopy notable for abnormal mucosa of the proximal bile duct w/ narrowing and nodularity w/ decreased vascular pattern s/p spy bite bx ** biopsy of CBD structure c/w carcinoma, favor adeno Common bile duct stricture biopsy-small clusters of atypical cells, detached were within stroma, compatible with carcinoma and favor adenocarcinoma. Bile duct mass biopsy-minute fragments of fibrous tissue with marked crush artifact and rare, atypical cells singly or in small clusters.  5/28/2024-PET/CT scan- FDG-avid soft tissue within the ze hepatis, surrounding common bile duct stent, extending into gallbladder/gallbladder fossa, corresponds to known malignancy. FDG-avid soft tissue nodule in right parotid gland is nonspecific. Differential diagnosis includes benign and malignant salivary gland neoplasms and an intraparotid lymph node. Further evaluation may be performed with ultrasound and percutaneous needle biopsy. Indeterminate FDG-avid focus in fundus of uterus. Pelvic ultrasound/pelvic MRI may be obtained for further evaluation.  6/10/2024-Started neoadjuvant Gemcitabine/Cisplatin/Durvalumab.  Course complicated by biliary stent issues, Klebsiella bacteremia (7/1/2024).  7/2/2024-CT abdomen/pelvis-IMPRESSION: Adequate positioning of the biliary stent, however there is no  pneumobilia to suggest patency. Mild intrahepatic biliary ductal  dilatation. New from 6/4/2024 are ill-defined hypodensities scattered throughout the  bilateral hepatic lobes, suggestive of metastatic disease.  7/3/24-MRI Abdomen-IMPRESSION: 1.  Focal stricture of the common hepatic duct/common biliary duct  measuring 1.6 cm with progressive enhancement, consistent with  cholangiocarcinoma. CBD stent traverse through the focal stricture. Mild  intra and extrahepatic biliary ductal dilatation. 2.  Complex solid and cystic lesion in the gallbladder fossa with areas  of thickened septations/nodular enhancement. 3.  Multiple subcentimeter T2 hyperintense foci throughout the hepatic  parenchyma and few of which in the right hepatic lobe demonstrating  peripheral rim enhancement. Although nonspecific, these are suspicious  for microabscesses.  9/4/2024-MRI Abdomen-Resolution of right hepatic lesions, in keeping with infection. Bile duct mass, with associated common hepatic duct narrowing, without change. Slightly increased CBD dilatation with distal tapering. Unchanged intrahepatic biliary duct dilatation. Fundal gallbladder mass, slightly decreased in size.  [de-identified] : Adenocarcinoma [de-identified] : 5/21/2024-CA 19-9=290 [de-identified] : 7/15/2024-[facility] Liquid biopsy-ZHANE, TMB=1 Muts/Mb. ARID1A and TET2 mutations.  No  diagnostic alterations for [employer] liquid CDX were detected. [de-identified] : Some insomnia related-to urinary frequency, otherwise feels well. Requesting referral for GYN as her GYN is on on maternity leave. No c/o N/V/D, fevers, dysuria, lower back pain. No c/o abdominal pain.   present

## 2024-10-28 NOTE — ASSESSMENT
[FreeTextEntry1] : Lab work reviewed, 10/28/24  #1) GB carcinoma-T4N1(Stage IIIC) clinically. 5/28/2024-PET/CT scan-. FDG-avid soft tissue within the ze hepatis, surrounding common bile duct stent, extending into gallbladder/gallbladder fossa, corresponds to known malignancy. FDG-avid soft tissue nodule in right parotid gland is nonspecific. Differential diagnosis includes benign and malignant salivary gland neoplasms and an intraparotid lymph node. Further evaluation may be performed with ultrasound and percutaneous needle biopsy. Indeterminate FDG-avid focus in fundus of uterus. Pelvic ultrasound/pelvic MRI may be obtained for further evaluation. -**Requested [employer], PD-L1 on pathology-insufficient tissue. 7/15/2024-[employer] Liquid biopsy-ZHANE, TMB=1 Muts/Mb. ARID1A and TET2 mutations.  No  diagnostic alterations for [employer] liquid CDX were detected. --had reviewed roles of surgery/radiation/systemic therapy in biliary tract cancers.-requires a multidisciplinary approach.  5/30/2024-Had discussed case with surgeon Dr. Allison.  He recommended neoadjuvant systemic therapy with interval follow-up imaging at 2 months, and pending this, again at 4 months for surgical decisions.  6/10/2024-Started neoadjuvant Gemcitabine/Cisplatin/Durvalumab.  Course complicated by biliary stent issues, Klebsiella bacteremia. Following treatment of infection, resumed treatment.  With creatinine improved, resumed cisplatin.  Needed to further elucidate liver lesions: 7/2/2024-CT abdomen/pelvis-IMPRESSION: Adequate positioning of the biliary stent, however there is no  pneumobilia to suggest patency. Mild intrahepatic biliary ductal  dilatation. New from 6/4/2024 are ill-defined hypodensities scattered throughout the  bilateral hepatic lobes, suggestive of metastatic disease.  7/3/24-MRI Abdomen-IMPRESSION: 1.  Focal stricture of the common hepatic duct/common biliary duct  measuring 1.6 cm with progressive enhancement, consistent with  cholangiocarcinoma. CBD stent traverse through the focal stricture. Mild  intra and extrahepatic biliary ductal dilatation. 2.  Complex solid and cystic lesion in the gallbladder fossa with areas  of thickened septations/nodular enhancement. 3.  Multiple subcentimeter T2 hyperintense foci throughout the hepatic  parenchyma and few of which in the right hepatic lobe demonstrating  peripheral rim enhancement. Although nonspecific, these are suspicious  for microabscesses.  --?Micro abscesses vs. mets 7/18/2024-MRI Abdomen-1.  Several small right hepatic lobe liver lesions are without significant change from 7/3/2024. While favored to be inflammatory from prior cholangitis (improvement on imaging can lag behind clinical improvement), metastatic disease remains possible. Follow-up MRI abdomen recommended in 6-8 weeks. 2.  Bile duct mass and gallbladder mass are without significant change. 3.  Iron deposition within the liver. 9/4/2024-MRI Abdomen-Resolution of right hepatic lesions, in keeping with infection. Bile duct mass, with associated common hepatic duct narrowing, without change. Slightly increased CBD dilatation with distal tapering. Unchanged intrahepatic biliary duct dilatation. Fundal gallbladder mass, slightly decreased in size.  --clinically stable for treatment today to which patient consents --Plt 92K. Recheck CBC w/ diff in 1 week.  --CT A/P ordered by surgeon-pending --patient aware to have biliary stent changed 1-2 months per GI-last 8/20/2024-due, discussed with patient.  #2) FDG-avid soft tissue nodule in right parotid gland nonspecific on PET/CT scan.  F/U right parotid US 7/30/2024-The right parotid gland is normal in size and echogenicity. Again noted is 8 x 7 x 5 mm heterogeneous predominantly isoechoic nodularity in the superficial midportion of the parotid gland, unchanged. Findings are nonspecific. Consider follow up ultrasound in 6 months. 9/30/2024-SALIVARY GLAND, PAROTID, RIGHT INFERIOR, US GUIDED FNA NON DIAGNOSTIC. Benign salivary gland tissue only ---to continue f/u with ENT MD   #3) indeterminate FDG-avid focus in fundus of uterus on PET/CT scan Pelvic US 7/30/2024-Leiomyomatous uterus. Subcentimeter bilateral simple ovarian cyst. Trace fundal endometrial fluid. --stated saw GYN MD 1/2024-will do yearly f/u as planned-requesting referral for new GYN-team will assist.  Patient was given the opportunity to ask questions. Her questions have been answered to her apparent satisfaction at this time. She expressed her understanding and willingness to comply with recommended f/u.  -->Gemcitabine 1000 mg/m IV days 1 and 8, along with Cisplatin 25 mg/m IV days 1 and 8.  Cycle is to be every 21 days. Durvalumab 1500 mg IV day 1 q. 21 days with chemotherapy. Cycle #7  Day # 8 chemo today. RTO  1  week to recheck CBC. Zarxio x 2 days post chemo. RTO in 2-weeks

## 2024-10-28 NOTE — REVIEW OF SYSTEMS
[Fatigue] : fatigue [Diarrhea: Grade 0] : Diarrhea: Grade 0 [Fever] : no fever [Dysuria] : no dysuria [Vaginal Discharge] : no vaginal discharge [Negative] : Gastrointestinal [FreeTextEntry8] : frequency

## 2024-10-28 NOTE — BEGINNING OF VISIT
[0] : 2) Feeling down, depressed, or hopeless: Not at all (0) [PHQ-2 Negative] : PHQ-2 Negative [[id number]] : 0

## 2024-10-28 NOTE — BEGINNING OF VISIT
[0] : 2) Feeling down, depressed, or hopeless: Not at all (0) [PHQ-2 Negative] : PHQ-2 Negative [[id number]] : 0

## 2024-11-06 NOTE — ASSESSMENT
[FreeTextEntry1] : Lab work reviewed, 11/6/24 ANC 1.28, Plt 32K. #1) GB carcinoma-T4N1(Stage IIIC) clinically. 5/28/2024-PET/CT scan-. FDG-avid soft tissue within the ze hepatis, surrounding common bile duct stent, extending into gallbladder/gallbladder fossa, corresponds to known malignancy. FDG-avid soft tissue nodule in right parotid gland is nonspecific. Differential diagnosis includes benign and malignant salivary gland neoplasms and an intraparotid lymph node. Further evaluation may be performed with ultrasound and percutaneous needle biopsy. Indeterminate FDG-avid focus in fundus of uterus. Pelvic ultrasound/pelvic MRI may be obtained for further evaluation. -**Requested [facility], PD-L1 on pathology-insufficient tissue. 7/15/2024-[facility] Liquid biopsy-ZHANE, TMB=1 Muts/Mb. ARID1A and TET2 mutations.  No  diagnostic alterations for [facility] liquid CDX were detected. --had reviewed roles of surgery/radiation/systemic therapy in biliary tract cancers.-requires a multidisciplinary approach.  5/30/2024-Had discussed case with surgeon Dr. Allison.  He recommended neoadjuvant systemic therapy with interval follow-up imaging at 2 months, and pending this, again at 4 months for surgical decisions.  6/10/2024-Started neoadjuvant Gemcitabine/Cisplatin/Durvalumab.  Course complicated by biliary stent issues, Klebsiella bacteremia. Following treatment of infection, resumed treatment.  With creatinine improved, resumed cisplatin.  Needed to further elucidate liver lesions: 7/2/2024-CT abdomen/pelvis-IMPRESSION: Adequate positioning of the biliary stent, however there is no  pneumobilia to suggest patency. Mild intrahepatic biliary ductal  dilatation. New from 6/4/2024 are ill-defined hypodensities scattered throughout the  bilateral hepatic lobes, suggestive of metastatic disease.  7/3/24-MRI Abdomen-IMPRESSION: 1.  Focal stricture of the common hepatic duct/common biliary duct  measuring 1.6 cm with progressive enhancement, consistent with  cholangiocarcinoma. CBD stent traverse through the focal stricture. Mild  intra and extrahepatic biliary ductal dilatation. 2.  Complex solid and cystic lesion in the gallbladder fossa with areas  of thickened septations/nodular enhancement. 3.  Multiple subcentimeter T2 hyperintense foci throughout the hepatic  parenchyma and few of which in the right hepatic lobe demonstrating  peripheral rim enhancement. Although nonspecific, these are suspicious  for microabscesses.  --?Micro abscesses vs. mets 7/18/2024-MRI Abdomen-1.  Several small right hepatic lobe liver lesions are without significant change from 7/3/2024. While favored to be inflammatory from prior cholangitis (improvement on imaging can lag behind clinical improvement), metastatic disease remains possible. Follow-up MRI abdomen recommended in 6-8 weeks. 2.  Bile duct mass and gallbladder mass are without significant change. 3.  Iron deposition within the liver. 9/4/2024-MRI Abdomen-Resolution of right hepatic lesions, in keeping with infection. Bile duct mass, with associated common hepatic duct narrowing, without change. Slightly increased CBD dilatation with distal tapering. Unchanged intrahepatic biliary duct dilatation. Fundal gallbladder mass, slightly decreased in size.  --chemotherapy-induced neutropenia: zarxio injections x3 --thrombocytopenia: Plt 32K. Caution with low platelets discussed spontaneous bruising/bleeding. --CT A/P ordered by surgeon-pending --patient aware to have biliary stent changed 1-2 months per GI-last 8/20/2024-due, discussed with patient.  #2) FDG-avid soft tissue nodule in right parotid gland nonspecific on PET/CT scan.  F/U right parotid US 7/30/2024-The right parotid gland is normal in size and echogenicity. Again noted is 8 x 7 x 5 mm heterogeneous predominantly isoechoic nodularity in the superficial midportion of the parotid gland, unchanged. Findings are nonspecific. Consider follow up ultrasound in 6 months. 9/30/2024-SALIVARY GLAND, PAROTID, RIGHT INFERIOR, US GUIDED FNA NON DIAGNOSTIC. Benign salivary gland tissue only ---to continue f/u with ENT MD   #3) indeterminate FDG-avid focus in fundus of uterus on PET/CT scan Pelvic US 7/30/2024-Leiomyomatous uterus. Subcentimeter bilateral simple ovarian cyst. Trace fundal endometrial fluid. --GYN appointment scheduled.   #4) Ecchymotic areas on arm: possible r/t insect bites.  -trial of compress altermating with cold/heat. Topical hydrocortisone cream prn.  Mediport was rescheduled d/t thrombocytopenia  Patient was given the opportunity to ask questions. Her questions have been answered to her apparent satisfaction at this time. She expressed her understanding and willingness to comply with recommended f/u.  -->Gemcitabine 1000 mg/m IV days 1 and 8, along with Cisplatin 25 mg/m IV days 1 and 8.  Cycle is to be every 21 days. Durvalumab 1500 mg IV day 1 q. 21 days with chemotherapy. Cycle #7  Day # 8 chemo today. RTO  1  week to recheck CBC. Zarxio x 2 days post chemo. RTO in 2-weeks

## 2024-11-06 NOTE — REVIEW OF SYSTEMS
[Fatigue] : fatigue [Diarrhea: Grade 0] : Diarrhea: Grade 0 [Negative] : Allergic/Immunologic [Dysuria] : no dysuria [Vaginal Discharge] : no vaginal discharge [FreeTextEntry8] : frequency [de-identified] : red spots on arm

## 2024-11-06 NOTE — PHYSICAL EXAM
[Restricted in physically strenuous activity but ambulatory and able to carry out work of a light or sedentary nature] : Status 1- Restricted in physically strenuous activity but ambulatory and able to carry out work of a light or sedentary nature, e.g., light house work, office work [Normal] : affect appropriate [de-identified] : ecchymosis noted on arms. +erythematous spots with small lumps noted on both arms.

## 2024-11-06 NOTE — HISTORY OF PRESENT ILLNESS
[Disease: _____________________] : Disease: [unfilled] [de-identified] : 5/2024-Patient presented to [facility] with elevated LFTs. At that time, she also noted progressively darkening urine and pale stools for one week along with postprandial epigastric pain. 5/10/2024-Abdominal ultrasound-moderate intrahepatic and extrahepatic biliary dilatation.  Abnormal appearing gallbladder.  Abnormal soft tissue density within the distal portion of the common bile duct and at the ze hepatitis.  The constellation of findings is most concerning for possible gallbladder neoplasm with extension to the ze habitus and associated biliary obstruction.  5/11/2024-CT abdomen/pelvis-gallbladder mass and soft tissue within the ze hepatis with enlarged portal caval node.  Soft tissue involvement of the biliary ducts and vasculature.  No evidence of metastatic disease within the chest. 5/11/2024 MR/MRCP-there is an approximately 2.2 cm obstructing mass centered at the bifurcation of the common hepatic duct with intrahepatic biliary duct dilatation, highly suspicious for cholangiocarcinoma (Klatskin tumor).  Masslike appearance of the gallbladder fundus with cystic changes measuring approximately 3.3 cm, which could represent either masslike adenoma I will mitosis versus a separate gallbladder mass.  No evidence of metastatic disease within the abdomen.  5/14/2024-EUS/ERCP 5/14/2024 (Dr. Dangelo) - proximal biliary mass without involvement of the portal vein, large ze hepatic LAD & gallbladder lesion noted - ERCP notable for hilar stricture s/p sphincterotomy, dilation of the stricture to 6 mm, brushing, biopsy, cholangioscopy & PLASTIC stent placed. *repeat in 3 months for stent removal/exchange if no sx done - cholangioscopy notable for abnormal mucosa of the proximal bile duct w/ narrowing and nodularity w/ decreased vascular pattern s/p spy bite bx ** biopsy of CBD structure c/w carcinoma, favor adeno Common bile duct stricture biopsy-small clusters of atypical cells, detached were within stroma, compatible with carcinoma and favor adenocarcinoma. Bile duct mass biopsy-minute fragments of fibrous tissue with marked crush artifact and rare, atypical cells singly or in small clusters.  5/28/2024-PET/CT scan- FDG-avid soft tissue within the ze hepatis, surrounding common bile duct stent, extending into gallbladder/gallbladder fossa, corresponds to known malignancy. FDG-avid soft tissue nodule in right parotid gland is nonspecific. Differential diagnosis includes benign and malignant salivary gland neoplasms and an intraparotid lymph node. Further evaluation may be performed with ultrasound and percutaneous needle biopsy. Indeterminate FDG-avid focus in fundus of uterus. Pelvic ultrasound/pelvic MRI may be obtained for further evaluation.  6/10/2024-Started neoadjuvant Gemcitabine/Cisplatin/Durvalumab.  Course complicated by biliary stent issues, Klebsiella bacteremia (7/1/2024).  7/2/2024-CT abdomen/pelvis-IMPRESSION: Adequate positioning of the biliary stent, however there is no  pneumobilia to suggest patency. Mild intrahepatic biliary ductal  dilatation. New from 6/4/2024 are ill-defined hypodensities scattered throughout the  bilateral hepatic lobes, suggestive of metastatic disease.  7/3/24-MRI Abdomen-IMPRESSION: 1.  Focal stricture of the common hepatic duct/common biliary duct  measuring 1.6 cm with progressive enhancement, consistent with  cholangiocarcinoma. CBD stent traverse through the focal stricture. Mild  intra and extrahepatic biliary ductal dilatation. 2.  Complex solid and cystic lesion in the gallbladder fossa with areas  of thickened septations/nodular enhancement. 3.  Multiple subcentimeter T2 hyperintense foci throughout the hepatic  parenchyma and few of which in the right hepatic lobe demonstrating  peripheral rim enhancement. Although nonspecific, these are suspicious  for microabscesses.  9/4/2024-MRI Abdomen-Resolution of right hepatic lesions, in keeping with infection. Bile duct mass, with associated common hepatic duct narrowing, without change. Slightly increased CBD dilatation with distal tapering. Unchanged intrahepatic biliary duct dilatation. Fundal gallbladder mass, slightly decreased in size.  [de-identified] : Adenocarcinoma [de-identified] : 5/21/2024-CA 19-9=290 [de-identified] : 7/15/2024-[facility] Liquid biopsy-ZHANE, TMB=1 Muts/Mb. ARID1A and TET2 mutations.  No  diagnostic alterations for [employer] liquid CDX were detected. [de-identified] : Red lumps noted on both hands. Mild pain when she touches; otherwise no pain. Reports gardening over the weekend. Does not recall bitten by insects. Denies itching.  +bruising. Received zarxio injections.  No c/o N/V/D, fevers, dysuria, lower back pain. No c/o abdominal pain.

## 2024-11-12 NOTE — HISTORY OF PRESENT ILLNESS
[Disease: _____________________] : Disease: [unfilled] [de-identified] : 5/2024-Patient presented to [facility] with elevated LFTs. At that time, she also noted progressively darkening urine and pale stools for one week along with postprandial epigastric pain. 5/10/2024-Abdominal ultrasound-moderate intrahepatic and extrahepatic biliary dilatation.  Abnormal appearing gallbladder.  Abnormal soft tissue density within the distal portion of the common bile duct and at the ze hepatitis.  The constellation of findings is most concerning for possible gallbladder neoplasm with extension to the ze habitus and associated biliary obstruction.  5/11/2024-CT abdomen/pelvis-gallbladder mass and soft tissue within the ze hepatis with enlarged portal caval node.  Soft tissue involvement of the biliary ducts and vasculature.  No evidence of metastatic disease within the chest. 5/11/2024 MR/MRCP-there is an approximately 2.2 cm obstructing mass centered at the bifurcation of the common hepatic duct with intrahepatic biliary duct dilatation, highly suspicious for cholangiocarcinoma (Klatskin tumor).  Masslike appearance of the gallbladder fundus with cystic changes measuring approximately 3.3 cm, which could represent either masslike adenoma I will mitosis versus a separate gallbladder mass.  No evidence of metastatic disease within the abdomen.  5/14/2024-EUS/ERCP 5/14/2024 (Dr. Dangelo) - proximal biliary mass without involvement of the portal vein, large ze hepatic LAD & gallbladder lesion noted - ERCP notable for hilar stricture s/p sphincterotomy, dilation of the stricture to 6 mm, brushing, biopsy, cholangioscopy & PLASTIC stent placed. *repeat in 3 months for stent removal/exchange if no sx done - cholangioscopy notable for abnormal mucosa of the proximal bile duct w/ narrowing and nodularity w/ decreased vascular pattern s/p spy bite bx ** biopsy of CBD structure c/w carcinoma, favor adeno Common bile duct stricture biopsy-small clusters of atypical cells, detached were within stroma, compatible with carcinoma and favor adenocarcinoma. Bile duct mass biopsy-minute fragments of fibrous tissue with marked crush artifact and rare, atypical cells singly or in small clusters.  5/28/2024-PET/CT scan- FDG-avid soft tissue within the ze hepatis, surrounding common bile duct stent, extending into gallbladder/gallbladder fossa, corresponds to known malignancy. FDG-avid soft tissue nodule in right parotid gland is nonspecific. Differential diagnosis includes benign and malignant salivary gland neoplasms and an intraparotid lymph node. Further evaluation may be performed with ultrasound and percutaneous needle biopsy. Indeterminate FDG-avid focus in fundus of uterus. Pelvic ultrasound/pelvic MRI may be obtained for further evaluation.  6/10/2024-Started neoadjuvant Gemcitabine/Cisplatin/Durvalumab.  Course complicated by biliary stent issues, Klebsiella bacteremia (7/1/2024).  7/2/2024-CT abdomen/pelvis-IMPRESSION: Adequate positioning of the biliary stent, however there is no  pneumobilia to suggest patency. Mild intrahepatic biliary ductal  dilatation. New from 6/4/2024 are ill-defined hypodensities scattered throughout the  bilateral hepatic lobes, suggestive of metastatic disease.  7/3/24-MRI Abdomen-IMPRESSION: 1.  Focal stricture of the common hepatic duct/common biliary duct  measuring 1.6 cm with progressive enhancement, consistent with  cholangiocarcinoma. CBD stent traverse through the focal stricture. Mild  intra and extrahepatic biliary ductal dilatation. 2.  Complex solid and cystic lesion in the gallbladder fossa with areas  of thickened septations/nodular enhancement. 3.  Multiple subcentimeter T2 hyperintense foci throughout the hepatic  parenchyma and few of which in the right hepatic lobe demonstrating  peripheral rim enhancement. Although nonspecific, these are suspicious  for microabscesses.  9/4/2024-MRI Abdomen-Resolution of right hepatic lesions, in keeping with infection. Bile duct mass, with associated common hepatic duct narrowing, without change. Slightly increased CBD dilatation with distal tapering. Unchanged intrahepatic biliary duct dilatation. Fundal gallbladder mass, slightly decreased in size.  [de-identified] : Adenocarcinoma [de-identified] : 5/21/2024-CA 19-9=290 [de-identified] : 7/15/2024-[facility] Liquid biopsy-ZHANE, TMB=1 Muts/Mb. ARID1A and TET2 mutations.  No  diagnostic alterations for [facility] liquid CDX were detected. [de-identified] : No new complaints. No c/o N/V/D, fevers. No c/o abdominal pain. No SOB.  Has port placement planned for tomorrow with IR.

## 2024-11-12 NOTE — ASSESSMENT
[FreeTextEntry1] : Lab work reviewed. #1) GB carcinoma-T4N1(Stage IIIC) clinically. 5/28/2024-PET/CT scan-. FDG-avid soft tissue within the ze hepatis, surrounding common bile duct stent, extending into gallbladder/gallbladder fossa, corresponds to known malignancy. FDG-avid soft tissue nodule in right parotid gland is nonspecific. Differential diagnosis includes benign and malignant salivary gland neoplasms and an intraparotid lymph node. Further evaluation may be performed with ultrasound and percutaneous needle biopsy. Indeterminate FDG-avid focus in fundus of uterus. Pelvic ultrasound/pelvic MRI may be obtained for further evaluation. -**Requested [facility], PD-L1 on pathology-insufficient tissue. 7/15/2024-[facility] Liquid biopsy-ZHANE, TMB=1 Muts/Mb. ARID1A and TET2 mutations.  No  diagnostic alterations for [facility] liquid CDX were detected. --had reviewed roles of surgery/radiation/systemic therapy in biliary tract cancers.-requires a multidisciplinary approach.  5/30/2024-Had discussed case with surgeon Dr. Allison.  He recommended neoadjuvant systemic therapy with interval follow-up imaging at 2 months, and pending this, again at 4 months for surgical decisions.  6/10/2024-Started neoadjuvant Gemcitabine/Cisplatin/Durvalumab.  Course complicated by biliary stent issues, Klebsiella bacteremia. Following treatment of infection, resumed treatment.  With creatinine improved, resumed cisplatin.  Needed to further elucidate liver lesions: 7/2/2024-CT abdomen/pelvis-IMPRESSION: Adequate positioning of the biliary stent, however there is no  pneumobilia to suggest patency. Mild intrahepatic biliary ductal  dilatation. New from 6/4/2024 are ill-defined hypodensities scattered throughout the  bilateral hepatic lobes, suggestive of metastatic disease.  7/3/24-MRI Abdomen-IMPRESSION: 1.  Focal stricture of the common hepatic duct/common biliary duct  measuring 1.6 cm with progressive enhancement, consistent with  cholangiocarcinoma. CBD stent traverse through the focal stricture. Mild  intra and extrahepatic biliary ductal dilatation. 2.  Complex solid and cystic lesion in the gallbladder fossa with areas  of thickened septations/nodular enhancement. 3.  Multiple subcentimeter T2 hyperintense foci throughout the hepatic  parenchyma and few of which in the right hepatic lobe demonstrating  peripheral rim enhancement. Although nonspecific, these are suspicious  for microabscesses.  --?Micro abscesses vs. mets 7/18/2024-MRI Abdomen-1.  Several small right hepatic lobe liver lesions are without significant change from 7/3/2024. While favored to be inflammatory from prior cholangitis (improvement on imaging can lag behind clinical improvement), metastatic disease remains possible. Follow-up MRI abdomen recommended in 6-8 weeks. 2.  Bile duct mass and gallbladder mass are without significant change. 3.  Iron deposition within the liver. 9/4/2024-MRI Abdomen-Resolution of right hepatic lesions, in keeping with infection. Bile duct mass, with associated common hepatic duct narrowing, without change. Slightly increased CBD dilatation with distal tapering. Unchanged intrahepatic biliary duct dilatation. Fundal gallbladder mass, slightly decreased in size.  --holding on treatment today due to neutropenia --CT A/P ordered by surgeon done 11/6/2024-report pending; to f/u with surgeon. --patient aware to have biliary stent changed 1-2 months per GI  #2) FDG-avid soft tissue nodule in right parotid gland nonspecific on PET/CT scan.  F/U right parotid US 7/30/2024-The right parotid gland is normal in size and echogenicity. Again noted is 8 x 7 x 5 mm heterogeneous predominantly isoechoic nodularity in the superficial midportion of the parotid gland, unchanged. Findings are nonspecific. Consider follow up ultrasound in 6 months. 9/30/2024-SALIVARY GLAND, PAROTID, RIGHT INFERIOR, US GUIDED FNA NON DIAGNOSTIC. Benign salivary gland tissue only ---to continue f/u with ENT MD   #3) indeterminate FDG-avid focus in fundus of uterus on PET/CT scan Pelvic US 7/30/2024-Leiomyomatous uterus. Subcentimeter bilateral simple ovarian cyst. Trace fundal endometrial fluid. --stated saw GYN MD 1/2024-will do yearly f/u as planned  Patient was given the opportunity to ask questions. Her questions have been answered to her apparent satisfaction at this time. She expressed her understanding and willingness to comply with recommended f/u.  -->Gemcitabine 1000 mg/m IV days 1 and 8, along with Cisplatin 25 mg/m IV days 1 and 8.  Cycle is to be every 21 days. Durvalumab 1500 mg IV day 1 q. 21 days with chemotherapy. Cycle #8  Day #  1 chemo held today. RTO  1  week. Zarxio x 3 days post chemo.

## 2024-11-12 NOTE — HISTORY OF PRESENT ILLNESS
[Disease: _____________________] : Disease: [unfilled] [de-identified] : 5/2024-Patient presented to [facility] with elevated LFTs. At that time, she also noted progressively darkening urine and pale stools for one week along with postprandial epigastric pain. 5/10/2024-Abdominal ultrasound-moderate intrahepatic and extrahepatic biliary dilatation.  Abnormal appearing gallbladder.  Abnormal soft tissue density within the distal portion of the common bile duct and at the ze hepatitis.  The constellation of findings is most concerning for possible gallbladder neoplasm with extension to the ze habitus and associated biliary obstruction.  5/11/2024-CT abdomen/pelvis-gallbladder mass and soft tissue within the ze hepatis with enlarged portal caval node.  Soft tissue involvement of the biliary ducts and vasculature.  No evidence of metastatic disease within the chest. 5/11/2024 MR/MRCP-there is an approximately 2.2 cm obstructing mass centered at the bifurcation of the common hepatic duct with intrahepatic biliary duct dilatation, highly suspicious for cholangiocarcinoma (Klatskin tumor).  Masslike appearance of the gallbladder fundus with cystic changes measuring approximately 3.3 cm, which could represent either masslike adenoma I will mitosis versus a separate gallbladder mass.  No evidence of metastatic disease within the abdomen.  5/14/2024-EUS/ERCP 5/14/2024 (Dr. Dangelo) - proximal biliary mass without involvement of the portal vein, large ze hepatic LAD & gallbladder lesion noted - ERCP notable for hilar stricture s/p sphincterotomy, dilation of the stricture to 6 mm, brushing, biopsy, cholangioscopy & PLASTIC stent placed. *repeat in 3 months for stent removal/exchange if no sx done - cholangioscopy notable for abnormal mucosa of the proximal bile duct w/ narrowing and nodularity w/ decreased vascular pattern s/p spy bite bx ** biopsy of CBD structure c/w carcinoma, favor adeno Common bile duct stricture biopsy-small clusters of atypical cells, detached were within stroma, compatible with carcinoma and favor adenocarcinoma. Bile duct mass biopsy-minute fragments of fibrous tissue with marked crush artifact and rare, atypical cells singly or in small clusters.  5/28/2024-PET/CT scan- FDG-avid soft tissue within the ze hepatis, surrounding common bile duct stent, extending into gallbladder/gallbladder fossa, corresponds to known malignancy. FDG-avid soft tissue nodule in right parotid gland is nonspecific. Differential diagnosis includes benign and malignant salivary gland neoplasms and an intraparotid lymph node. Further evaluation may be performed with ultrasound and percutaneous needle biopsy. Indeterminate FDG-avid focus in fundus of uterus. Pelvic ultrasound/pelvic MRI may be obtained for further evaluation.  6/10/2024-Started neoadjuvant Gemcitabine/Cisplatin/Durvalumab.  Course complicated by biliary stent issues, Klebsiella bacteremia (7/1/2024).  7/2/2024-CT abdomen/pelvis-IMPRESSION: Adequate positioning of the biliary stent, however there is no  pneumobilia to suggest patency. Mild intrahepatic biliary ductal  dilatation. New from 6/4/2024 are ill-defined hypodensities scattered throughout the  bilateral hepatic lobes, suggestive of metastatic disease.  7/3/24-MRI Abdomen-IMPRESSION: 1.  Focal stricture of the common hepatic duct/common biliary duct  measuring 1.6 cm with progressive enhancement, consistent with  cholangiocarcinoma. CBD stent traverse through the focal stricture. Mild  intra and extrahepatic biliary ductal dilatation. 2.  Complex solid and cystic lesion in the gallbladder fossa with areas  of thickened septations/nodular enhancement. 3.  Multiple subcentimeter T2 hyperintense foci throughout the hepatic  parenchyma and few of which in the right hepatic lobe demonstrating  peripheral rim enhancement. Although nonspecific, these are suspicious  for microabscesses.  9/4/2024-MRI Abdomen-Resolution of right hepatic lesions, in keeping with infection. Bile duct mass, with associated common hepatic duct narrowing, without change. Slightly increased CBD dilatation with distal tapering. Unchanged intrahepatic biliary duct dilatation. Fundal gallbladder mass, slightly decreased in size.  [de-identified] : Adenocarcinoma [de-identified] : 5/21/2024-CA 19-9=290 [de-identified] : 7/15/2024-[facility] Liquid biopsy-ZHANE, TMB=1 Muts/Mb. ARID1A and TET2 mutations.  No  diagnostic alterations for [employer] liquid CDX were detected. [de-identified] : No new complaints. No c/o N/V/D, fevers. No c/o abdominal pain. No SOB.  Has port placement planned for tomorrow with IR.

## 2024-11-18 NOTE — HISTORY OF PRESENT ILLNESS
[Disease: _____________________] : Disease: [unfilled] [de-identified] : 5/2024-Patient presented to [facility] with elevated LFTs. At that time, she also noted progressively darkening urine and pale stools for one week along with postprandial epigastric pain. 5/10/2024-Abdominal ultrasound-moderate intrahepatic and extrahepatic biliary dilatation.  Abnormal appearing gallbladder.  Abnormal soft tissue density within the distal portion of the common bile duct and at the ze hepatitis.  The constellation of findings is most concerning for possible gallbladder neoplasm with extension to the ze habitus and associated biliary obstruction.  5/11/2024-CT abdomen/pelvis-gallbladder mass and soft tissue within the ze hepatis with enlarged portal caval node.  Soft tissue involvement of the biliary ducts and vasculature.  No evidence of metastatic disease within the chest. 5/11/2024 MR/MRCP-there is an approximately 2.2 cm obstructing mass centered at the bifurcation of the common hepatic duct with intrahepatic biliary duct dilatation, highly suspicious for cholangiocarcinoma (Klatskin tumor).  Masslike appearance of the gallbladder fundus with cystic changes measuring approximately 3.3 cm, which could represent either masslike adenoma I will mitosis versus a separate gallbladder mass.  No evidence of metastatic disease within the abdomen.  5/14/2024-EUS/ERCP 5/14/2024 (Dr. Dangelo) - proximal biliary mass without involvement of the portal vein, large ze hepatic LAD & gallbladder lesion noted - ERCP notable for hilar stricture s/p sphincterotomy, dilation of the stricture to 6 mm, brushing, biopsy, cholangioscopy & PLASTIC stent placed. *repeat in 3 months for stent removal/exchange if no sx done - cholangioscopy notable for abnormal mucosa of the proximal bile duct w/ narrowing and nodularity w/ decreased vascular pattern s/p spy bite bx ** biopsy of CBD structure c/w carcinoma, favor adeno Common bile duct stricture biopsy-small clusters of atypical cells, detached were within stroma, compatible with carcinoma and favor adenocarcinoma. Bile duct mass biopsy-minute fragments of fibrous tissue with marked crush artifact and rare, atypical cells singly or in small clusters.  5/28/2024-PET/CT scan- FDG-avid soft tissue within the ze hepatis, surrounding common bile duct stent, extending into gallbladder/gallbladder fossa, corresponds to known malignancy. FDG-avid soft tissue nodule in right parotid gland is nonspecific. Differential diagnosis includes benign and malignant salivary gland neoplasms and an intraparotid lymph node. Further evaluation may be performed with ultrasound and percutaneous needle biopsy. Indeterminate FDG-avid focus in fundus of uterus. Pelvic ultrasound/pelvic MRI may be obtained for further evaluation.  6/10/2024-Started neoadjuvant Gemcitabine/Cisplatin/Durvalumab.  Course complicated by biliary stent issues, Klebsiella bacteremia (7/1/2024).  7/2/2024-CT abdomen/pelvis-IMPRESSION: Adequate positioning of the biliary stent, however there is no  pneumobilia to suggest patency. Mild intrahepatic biliary ductal  dilatation. New from 6/4/2024 are ill-defined hypodensities scattered throughout the  bilateral hepatic lobes, suggestive of metastatic disease.  7/3/24-MRI Abdomen-IMPRESSION: 1.  Focal stricture of the common hepatic duct/common biliary duct  measuring 1.6 cm with progressive enhancement, consistent with  cholangiocarcinoma. CBD stent traverse through the focal stricture. Mild  intra and extrahepatic biliary ductal dilatation. 2.  Complex solid and cystic lesion in the gallbladder fossa with areas  of thickened septations/nodular enhancement. 3.  Multiple subcentimeter T2 hyperintense foci throughout the hepatic  parenchyma and few of which in the right hepatic lobe demonstrating  peripheral rim enhancement. Although nonspecific, these are suspicious  for microabscesses.  9/4/2024-MRI Abdomen-Resolution of right hepatic lesions, in keeping with infection. Bile duct mass, with associated common hepatic duct narrowing, without change. Slightly increased CBD dilatation with distal tapering. Unchanged intrahepatic biliary duct dilatation. Fundal gallbladder mass, slightly decreased in size.  11/13/2024-Mediport placement  [de-identified] : Adenocarcinoma [de-identified] : 5/21/2024-CA 19-9=290 [de-identified] : 7/15/2024-[facility] Liquid biopsy-ZHANE, TMB=1 Muts/Mb. ARID1A and TET2 mutations.  No  diagnostic alterations for [employer] liquid CDX were detected. [de-identified] : C8D1 held last week due to neutropenia. port placement on 11/13/24. Mild sensitive to mediport site-intact. No bruising/bleeding or erythema. No new complaints. No c/o N/V/D, fevers, bleeding, bruising. No c/o abdominal pain. No SOB.

## 2024-11-18 NOTE — PHYSICAL EXAM
[Normal] : affect appropriate [de-identified] : Right upper chest-mediport intact without erythema, swelling, bruising

## 2024-11-18 NOTE — REVIEW OF SYSTEMS
[Fatigue] : fatigue [Diarrhea: Grade 0] : Diarrhea: Grade 0 [Negative] : Allergic/Immunologic [Fever] : no fever [Easy Bleeding] : no tendency for easy bleeding [Easy Bruising] : no tendency for easy bruising [de-identified] : right upper chest mediport

## 2024-11-18 NOTE — ASSESSMENT
[FreeTextEntry1] : Lab work reviewed, 11/18/24. #1) GB carcinoma-T4N1(Stage IIIC) clinically. 5/28/2024-PET/CT scan-. FDG-avid soft tissue within the ze hepatis, surrounding common bile duct stent, extending into gallbladder/gallbladder fossa, corresponds to known malignancy. FDG-avid soft tissue nodule in right parotid gland is nonspecific. Differential diagnosis includes benign and malignant salivary gland neoplasms and an intraparotid lymph node. Further evaluation may be performed with ultrasound and percutaneous needle biopsy. Indeterminate FDG-avid focus in fundus of uterus. Pelvic ultrasound/pelvic MRI may be obtained for further evaluation. -**Requested [facility], PD-L1 on pathology-insufficient tissue. 7/15/2024-[facility] Liquid biopsy-ZHANE, TMB=1 Muts/Mb. ARID1A and TET2 mutations.  No  diagnostic alterations for [employer] liquid CDX were detected. --had reviewed roles of surgery/radiation/systemic therapy in biliary tract cancers.-requires a multidisciplinary approach.  5/30/2024-Had discussed case with surgeon Dr. Allison.  He recommended neoadjuvant systemic therapy with interval follow-up imaging at 2 months, and pending this, again at 4 months for surgical decisions.  6/10/2024-Started neoadjuvant Gemcitabine/Cisplatin/Durvalumab.  Course complicated by biliary stent issues, Klebsiella bacteremia. Following treatment of infection, resumed treatment.  With creatinine improved, resumed cisplatin.  Needed to further elucidate liver lesions: 7/2/2024-CT abdomen/pelvis-IMPRESSION: Adequate positioning of the biliary stent, however there is no  pneumobilia to suggest patency. Mild intrahepatic biliary ductal  dilatation. New from 6/4/2024 are ill-defined hypodensities scattered throughout the  bilateral hepatic lobes, suggestive of metastatic disease.  7/3/24-MRI Abdomen-IMPRESSION: 1.  Focal stricture of the common hepatic duct/common biliary duct  measuring 1.6 cm with progressive enhancement, consistent with  cholangiocarcinoma. CBD stent traverse through the focal stricture. Mild  intra and extrahepatic biliary ductal dilatation. 2.  Complex solid and cystic lesion in the gallbladder fossa with areas  of thickened septations/nodular enhancement. 3.  Multiple subcentimeter T2 hyperintense foci throughout the hepatic  parenchyma and few of which in the right hepatic lobe demonstrating  peripheral rim enhancement. Although nonspecific, these are suspicious  for microabscesses.  --?Micro abscesses vs. mets 7/18/2024-MRI Abdomen-1.  Several small right hepatic lobe liver lesions are without significant change from 7/3/2024. While favored to be inflammatory from prior cholangitis (improvement on imaging can lag behind clinical improvement), metastatic disease remains possible. Follow-up MRI abdomen recommended in 6-8 weeks. 2.  Bile duct mass and gallbladder mass are without significant change. 3.  Iron deposition within the liver. 9/4/2024-MRI Abdomen-Resolution of right hepatic lesions, in keeping with infection. Bile duct mass, with associated common hepatic duct narrowing, without change. Slightly increased CBD dilatation with distal tapering. Unchanged intrahepatic biliary duct dilatation. Fundal gallbladder mass, slightly decreased in size. --11/13/24-mediport placement  --C8D1 held last week due to neutropenia. ANC normalized today, clinically stable for treatment today for which patient consented. --CT A/P ordered by surgeon done 11/6/2024-report pending; to f/u with surgeon. --patient aware to have biliary stent changed 1-2 months per GI-appointment scheduled   #2) FDG-avid soft tissue nodule in right parotid gland nonspecific on PET/CT scan.  F/U right parotid US 7/30/2024-The right parotid gland is normal in size and echogenicity. Again noted is 8 x 7 x 5 mm heterogeneous predominantly isoechoic nodularity in the superficial midportion of the parotid gland, unchanged. Findings are nonspecific. Consider follow up ultrasound in 6 months. 9/30/2024-SALIVARY GLAND, PAROTID, RIGHT INFERIOR, US GUIDED FNA NON DIAGNOSTIC. Benign salivary gland tissue only ---to continue f/u with ENT MD   #3) indeterminate FDG-avid focus in fundus of uterus on PET/CT scan Pelvic US 7/30/2024-Leiomyomatous uterus. Subcentimeter bilateral simple ovarian cyst. Trace fundal endometrial fluid. --stated saw GYN MD 1/2024-will do yearly f/u as planned  #4)thrombocytopenia, grade 1: likely chemotherapy-related. Ok for treatment today.  Patient was given the opportunity to ask questions. Her questions have been answered to her apparent satisfaction at this time. She expressed her understanding and willingness to comply with recommended f/u.  -->Gemcitabine 1000 mg/m IV days 1 and 8, along with Cisplatin 25 mg/m IV days 1 and 8.  Cycle is to be every 21 days. Durvalumab 1500 mg IV day 1 q. 21 days with chemotherapy. Cycle #8  Day #  1. Zarxio x 3 days post chemo. RTO  1  week.

## 2024-11-25 NOTE — HISTORY OF PRESENT ILLNESS
[Disease: _____________________] : Disease: [unfilled] [de-identified] : 5/2024-Patient presented to [facility] with elevated LFTs. At that time, she also noted progressively darkening urine and pale stools for one week along with postprandial epigastric pain. 5/10/2024-Abdominal ultrasound-moderate intrahepatic and extrahepatic biliary dilatation.  Abnormal appearing gallbladder.  Abnormal soft tissue density within the distal portion of the common bile duct and at the ze hepatitis.  The constellation of findings is most concerning for possible gallbladder neoplasm with extension to the ze habitus and associated biliary obstruction.  5/11/2024-CT abdomen/pelvis-gallbladder mass and soft tissue within the ze hepatis with enlarged portal caval node.  Soft tissue involvement of the biliary ducts and vasculature.  No evidence of metastatic disease within the chest. 5/11/2024 MR/MRCP-there is an approximately 2.2 cm obstructing mass centered at the bifurcation of the common hepatic duct with intrahepatic biliary duct dilatation, highly suspicious for cholangiocarcinoma (Klatskin tumor).  Masslike appearance of the gallbladder fundus with cystic changes measuring approximately 3.3 cm, which could represent either masslike adenoma I will mitosis versus a separate gallbladder mass.  No evidence of metastatic disease within the abdomen.  5/14/2024-EUS/ERCP 5/14/2024 (Dr. Dangelo) - proximal biliary mass without involvement of the portal vein, large ze hepatic LAD & gallbladder lesion noted - ERCP notable for hilar stricture s/p sphincterotomy, dilation of the stricture to 6 mm, brushing, biopsy, cholangioscopy & PLASTIC stent placed. *repeat in 3 months for stent removal/exchange if no sx done - cholangioscopy notable for abnormal mucosa of the proximal bile duct w/ narrowing and nodularity w/ decreased vascular pattern s/p spy bite bx ** biopsy of CBD structure c/w carcinoma, favor adeno Common bile duct stricture biopsy-small clusters of atypical cells, detached were within stroma, compatible with carcinoma and favor adenocarcinoma. Bile duct mass biopsy-minute fragments of fibrous tissue with marked crush artifact and rare, atypical cells singly or in small clusters.  5/28/2024-PET/CT scan- FDG-avid soft tissue within the ze hepatis, surrounding common bile duct stent, extending into gallbladder/gallbladder fossa, corresponds to known malignancy. FDG-avid soft tissue nodule in right parotid gland is nonspecific. Differential diagnosis includes benign and malignant salivary gland neoplasms and an intraparotid lymph node. Further evaluation may be performed with ultrasound and percutaneous needle biopsy. Indeterminate FDG-avid focus in fundus of uterus. Pelvic ultrasound/pelvic MRI may be obtained for further evaluation.  6/10/2024-Started neoadjuvant Gemcitabine/Cisplatin/Durvalumab.  Course complicated by biliary stent issues, Klebsiella bacteremia (7/1/2024).  7/2/2024-CT abdomen/pelvis-IMPRESSION: Adequate positioning of the biliary stent, however there is no  pneumobilia to suggest patency. Mild intrahepatic biliary ductal  dilatation. New from 6/4/2024 are ill-defined hypodensities scattered throughout the  bilateral hepatic lobes, suggestive of metastatic disease.  7/3/24-MRI Abdomen-IMPRESSION: 1.  Focal stricture of the common hepatic duct/common biliary duct  measuring 1.6 cm with progressive enhancement, consistent with  cholangiocarcinoma. CBD stent traverse through the focal stricture. Mild  intra and extrahepatic biliary ductal dilatation. 2.  Complex solid and cystic lesion in the gallbladder fossa with areas  of thickened septations/nodular enhancement. 3.  Multiple subcentimeter T2 hyperintense foci throughout the hepatic  parenchyma and few of which in the right hepatic lobe demonstrating  peripheral rim enhancement. Although nonspecific, these are suspicious  for microabscesses.  9/4/2024-MRI Abdomen-Resolution of right hepatic lesions, in keeping with infection. Bile duct mass, with associated common hepatic duct narrowing, without change. Slightly increased CBD dilatation with distal tapering. Unchanged intrahepatic biliary duct dilatation. Fundal gallbladder mass, slightly decreased in size. 11/6/2024-CT A/P-Fundal gallbladder mass is slightly decreased in size since 9/4/2024. CBD dilatation, unchanged. Known CBD mass is difficult to visualize on CT.  [de-identified] : Adenocarcinoma [de-identified] : 5/21/2024-CA 19-9=290 [de-identified] : 7/15/2024-[facility] Liquid biopsy-ZHANE, TMB=1 Muts/Mb. ARID1A and TET2 mutations.  No  diagnostic alterations for [employer] liquid CDX were detected. [de-identified] : S/P port placement. Has biliary stent exchange next week on 12/3/24. No c/o N/V/D, fevers. No c/o abdominal pain. No SOB.

## 2024-11-25 NOTE — ASSESSMENT
[FreeTextEntry1] : Lab work, CT A/P report reviewed. #1) GB carcinoma-T4N1(Stage IIIC) clinically. 5/28/2024-PET/CT scan-. FDG-avid soft tissue within the ze hepatis, surrounding common bile duct stent, extending into gallbladder/gallbladder fossa, corresponds to known malignancy. FDG-avid soft tissue nodule in right parotid gland is nonspecific. Differential diagnosis includes benign and malignant salivary gland neoplasms and an intraparotid lymph node. Further evaluation may be performed with ultrasound and percutaneous needle biopsy. Indeterminate FDG-avid focus in fundus of uterus. Pelvic ultrasound/pelvic MRI may be obtained for further evaluation. -**Requested [facility], PD-L1 on pathology-insufficient tissue. 7/15/2024-[facility] Liquid biopsy-ZHANE, TMB=1 Muts/Mb. ARID1A and TET2 mutations.  No  diagnostic alterations for [employer] liquid CDX were detected. --had reviewed roles of surgery/radiation/systemic therapy in biliary tract cancers.-requires a multidisciplinary approach.  5/30/2024-Had discussed case with surgeon Dr. Allison.  He recommended neoadjuvant systemic therapy with interval follow-up imaging at 2 months, and pending this, again at 4 months for surgical decisions.  6/10/2024-Started neoadjuvant Gemcitabine/Cisplatin/Durvalumab.  Course complicated by biliary stent issues, Klebsiella bacteremia. Following treatment of infection, resumed treatment.  With creatinine improved, resumed cisplatin.  Needed to further elucidate liver lesions: 7/2/2024-CT abdomen/pelvis-IMPRESSION: Adequate positioning of the biliary stent, however there is no  pneumobilia to suggest patency. Mild intrahepatic biliary ductal  dilatation. New from 6/4/2024 are ill-defined hypodensities scattered throughout the  bilateral hepatic lobes, suggestive of metastatic disease.  7/3/24-MRI Abdomen-IMPRESSION: 1.  Focal stricture of the common hepatic duct/common biliary duct  measuring 1.6 cm with progressive enhancement, consistent with  cholangiocarcinoma. CBD stent traverse through the focal stricture. Mild  intra and extrahepatic biliary ductal dilatation. 2.  Complex solid and cystic lesion in the gallbladder fossa with areas  of thickened septations/nodular enhancement. 3.  Multiple subcentimeter T2 hyperintense foci throughout the hepatic  parenchyma and few of which in the right hepatic lobe demonstrating  peripheral rim enhancement. Although nonspecific, these are suspicious  for microabscesses.  --?Micro abscesses vs. mets 7/18/2024-MRI Abdomen-1.  Several small right hepatic lobe liver lesions are without significant change from 7/3/2024. While favored to be inflammatory from prior cholangitis (improvement on imaging can lag behind clinical improvement), metastatic disease remains possible. Follow-up MRI abdomen recommended in 6-8 weeks. 2.  Bile duct mass and gallbladder mass are without significant change. 3.  Iron deposition within the liver. 9/4/2024-MRI Abdomen-Resolution of right hepatic lesions, in keeping with infection. Bile duct mass, with associated common hepatic duct narrowing, without change. Slightly increased CBD dilatation with distal tapering. Unchanged intrahepatic biliary duct dilatation. Fundal gallbladder mass, slightly decreased in size. 11/6/2024-CT A/P-Fundal gallbladder mass is slightly decreased in size since 9/4/2024. CBD dilatation, unchanged. Known CBD mass is difficult to visualize on CT.  --clinically stable for chemo today --patient aware to have biliary stent changed 1-2 months per GI-next exchange scheduled 12/3/2024. --f/u appt. with surgeon recommended.  Telephone call with Dr. Allison and discussed updated case with him.  He stated he will order an MRI of the liver with [facility] to better evaluate, and then make surgical decisions with patient.  #2) FDG-avid soft tissue nodule in right parotid gland nonspecific on PET/CT scan.  F/U right parotid US 7/30/2024-The right parotid gland is normal in size and echogenicity. Again noted is 8 x 7 x 5 mm heterogeneous predominantly isoechoic nodularity in the superficial midportion of the parotid gland, unchanged. Findings are nonspecific. Consider follow up ultrasound in 6 months. 9/2024-Saw ENT MD Dr. Cid. 9/30/2024-SALIVARY GLAND, PAROTID, RIGHT INFERIOR, US GUIDED FNA NON DIAGNOSTIC. Benign salivary gland tissue only ---to continue f/u with ENT MD   #3) indeterminate FDG-avid focus in fundus of uterus on PET/CT scan Pelvic US 7/30/2024-Leiomyomatous uterus. Subcentimeter bilateral simple ovarian cyst. Trace fundal endometrial fluid. --stated saw GYN MD 1/2024-will do yearly f/u as planned  Patient was given the opportunity to ask questions. Her questions have been answered to her apparent satisfaction at this time. She expressed her understanding and willingness to comply with recommended f/u.  -->Gemcitabine 1000 mg/m IV days 1 and 8, along with Cisplatin 25 mg/m IV days 1 and 8.  Cycle is to be every 21 days. Durvalumab 1500 mg IV day 1 q. 21 days with chemotherapy. Cycle #8  Day #  8 chemo today. Zarxio x 3 days post chemo. RTO 4 weeks-POF/labs then.

## 2024-11-25 NOTE — HISTORY OF PRESENT ILLNESS
[Disease: _____________________] : Disease: [unfilled] [de-identified] : 5/2024-Patient presented to [facility] with elevated LFTs. At that time, she also noted progressively darkening urine and pale stools for one week along with postprandial epigastric pain. 5/10/2024-Abdominal ultrasound-moderate intrahepatic and extrahepatic biliary dilatation.  Abnormal appearing gallbladder.  Abnormal soft tissue density within the distal portion of the common bile duct and at the ze hepatitis.  The constellation of findings is most concerning for possible gallbladder neoplasm with extension to the ze habitus and associated biliary obstruction.  5/11/2024-CT abdomen/pelvis-gallbladder mass and soft tissue within the ze hepatis with enlarged portal caval node.  Soft tissue involvement of the biliary ducts and vasculature.  No evidence of metastatic disease within the chest. 5/11/2024 MR/MRCP-there is an approximately 2.2 cm obstructing mass centered at the bifurcation of the common hepatic duct with intrahepatic biliary duct dilatation, highly suspicious for cholangiocarcinoma (Klatskin tumor).  Masslike appearance of the gallbladder fundus with cystic changes measuring approximately 3.3 cm, which could represent either masslike adenoma I will mitosis versus a separate gallbladder mass.  No evidence of metastatic disease within the abdomen.  5/14/2024-EUS/ERCP 5/14/2024 (Dr. Dangelo) - proximal biliary mass without involvement of the portal vein, large ze hepatic LAD & gallbladder lesion noted - ERCP notable for hilar stricture s/p sphincterotomy, dilation of the stricture to 6 mm, brushing, biopsy, cholangioscopy & PLASTIC stent placed. *repeat in 3 months for stent removal/exchange if no sx done - cholangioscopy notable for abnormal mucosa of the proximal bile duct w/ narrowing and nodularity w/ decreased vascular pattern s/p spy bite bx ** biopsy of CBD structure c/w carcinoma, favor adeno Common bile duct stricture biopsy-small clusters of atypical cells, detached were within stroma, compatible with carcinoma and favor adenocarcinoma. Bile duct mass biopsy-minute fragments of fibrous tissue with marked crush artifact and rare, atypical cells singly or in small clusters.  5/28/2024-PET/CT scan- FDG-avid soft tissue within the ze hepatis, surrounding common bile duct stent, extending into gallbladder/gallbladder fossa, corresponds to known malignancy. FDG-avid soft tissue nodule in right parotid gland is nonspecific. Differential diagnosis includes benign and malignant salivary gland neoplasms and an intraparotid lymph node. Further evaluation may be performed with ultrasound and percutaneous needle biopsy. Indeterminate FDG-avid focus in fundus of uterus. Pelvic ultrasound/pelvic MRI may be obtained for further evaluation.  6/10/2024-Started neoadjuvant Gemcitabine/Cisplatin/Durvalumab.  Course complicated by biliary stent issues, Klebsiella bacteremia (7/1/2024).  7/2/2024-CT abdomen/pelvis-IMPRESSION: Adequate positioning of the biliary stent, however there is no  pneumobilia to suggest patency. Mild intrahepatic biliary ductal  dilatation. New from 6/4/2024 are ill-defined hypodensities scattered throughout the  bilateral hepatic lobes, suggestive of metastatic disease.  7/3/24-MRI Abdomen-IMPRESSION: 1.  Focal stricture of the common hepatic duct/common biliary duct  measuring 1.6 cm with progressive enhancement, consistent with  cholangiocarcinoma. CBD stent traverse through the focal stricture. Mild  intra and extrahepatic biliary ductal dilatation. 2.  Complex solid and cystic lesion in the gallbladder fossa with areas  of thickened septations/nodular enhancement. 3.  Multiple subcentimeter T2 hyperintense foci throughout the hepatic  parenchyma and few of which in the right hepatic lobe demonstrating  peripheral rim enhancement. Although nonspecific, these are suspicious  for microabscesses.  9/4/2024-MRI Abdomen-Resolution of right hepatic lesions, in keeping with infection. Bile duct mass, with associated common hepatic duct narrowing, without change. Slightly increased CBD dilatation with distal tapering. Unchanged intrahepatic biliary duct dilatation. Fundal gallbladder mass, slightly decreased in size. 11/6/2024-CT A/P-Fundal gallbladder mass is slightly decreased in size since 9/4/2024. CBD dilatation, unchanged. Known CBD mass is difficult to visualize on CT.  [de-identified] : Adenocarcinoma [de-identified] : 5/21/2024-CA 19-9=290 [de-identified] : 7/15/2024-[facility] Liquid biopsy-ZHANE, TMB=1 Muts/Mb. ARID1A and TET2 mutations.  No  diagnostic alterations for [employer] liquid CDX were detected. [de-identified] : S/P port placement. Has biliary stent exchange next week on 12/3/24. No c/o N/V/D, fevers. No c/o abdominal pain. No SOB.

## 2024-11-25 NOTE — HISTORY OF PRESENT ILLNESS
[Disease: _____________________] : Disease: [unfilled] [de-identified] : 5/2024-Patient presented to [facility] with elevated LFTs. At that time, she also noted progressively darkening urine and pale stools for one week along with postprandial epigastric pain. 5/10/2024-Abdominal ultrasound-moderate intrahepatic and extrahepatic biliary dilatation.  Abnormal appearing gallbladder.  Abnormal soft tissue density within the distal portion of the common bile duct and at the ze hepatitis.  The constellation of findings is most concerning for possible gallbladder neoplasm with extension to the ze habitus and associated biliary obstruction.  5/11/2024-CT abdomen/pelvis-gallbladder mass and soft tissue within the ze hepatis with enlarged portal caval node.  Soft tissue involvement of the biliary ducts and vasculature.  No evidence of metastatic disease within the chest. 5/11/2024 MR/MRCP-there is an approximately 2.2 cm obstructing mass centered at the bifurcation of the common hepatic duct with intrahepatic biliary duct dilatation, highly suspicious for cholangiocarcinoma (Klatskin tumor).  Masslike appearance of the gallbladder fundus with cystic changes measuring approximately 3.3 cm, which could represent either masslike adenoma I will mitosis versus a separate gallbladder mass.  No evidence of metastatic disease within the abdomen.  5/14/2024-EUS/ERCP 5/14/2024 (Dr. Dangelo) - proximal biliary mass without involvement of the portal vein, large ze hepatic LAD & gallbladder lesion noted - ERCP notable for hilar stricture s/p sphincterotomy, dilation of the stricture to 6 mm, brushing, biopsy, cholangioscopy & PLASTIC stent placed. *repeat in 3 months for stent removal/exchange if no sx done - cholangioscopy notable for abnormal mucosa of the proximal bile duct w/ narrowing and nodularity w/ decreased vascular pattern s/p spy bite bx ** biopsy of CBD structure c/w carcinoma, favor adeno Common bile duct stricture biopsy-small clusters of atypical cells, detached were within stroma, compatible with carcinoma and favor adenocarcinoma. Bile duct mass biopsy-minute fragments of fibrous tissue with marked crush artifact and rare, atypical cells singly or in small clusters.  5/28/2024-PET/CT scan- FDG-avid soft tissue within the ze hepatis, surrounding common bile duct stent, extending into gallbladder/gallbladder fossa, corresponds to known malignancy. FDG-avid soft tissue nodule in right parotid gland is nonspecific. Differential diagnosis includes benign and malignant salivary gland neoplasms and an intraparotid lymph node. Further evaluation may be performed with ultrasound and percutaneous needle biopsy. Indeterminate FDG-avid focus in fundus of uterus. Pelvic ultrasound/pelvic MRI may be obtained for further evaluation.  6/10/2024-Started neoadjuvant Gemcitabine/Cisplatin/Durvalumab.  Course complicated by biliary stent issues, Klebsiella bacteremia (7/1/2024).  7/2/2024-CT abdomen/pelvis-IMPRESSION: Adequate positioning of the biliary stent, however there is no  pneumobilia to suggest patency. Mild intrahepatic biliary ductal  dilatation. New from 6/4/2024 are ill-defined hypodensities scattered throughout the  bilateral hepatic lobes, suggestive of metastatic disease.  7/3/24-MRI Abdomen-IMPRESSION: 1.  Focal stricture of the common hepatic duct/common biliary duct  measuring 1.6 cm with progressive enhancement, consistent with  cholangiocarcinoma. CBD stent traverse through the focal stricture. Mild  intra and extrahepatic biliary ductal dilatation. 2.  Complex solid and cystic lesion in the gallbladder fossa with areas  of thickened septations/nodular enhancement. 3.  Multiple subcentimeter T2 hyperintense foci throughout the hepatic  parenchyma and few of which in the right hepatic lobe demonstrating  peripheral rim enhancement. Although nonspecific, these are suspicious  for microabscesses.  9/4/2024-MRI Abdomen-Resolution of right hepatic lesions, in keeping with infection. Bile duct mass, with associated common hepatic duct narrowing, without change. Slightly increased CBD dilatation with distal tapering. Unchanged intrahepatic biliary duct dilatation. Fundal gallbladder mass, slightly decreased in size. 11/6/2024-CT A/P-Fundal gallbladder mass is slightly decreased in size since 9/4/2024. CBD dilatation, unchanged. Known CBD mass is difficult to visualize on CT.  [de-identified] : Adenocarcinoma [de-identified] : 5/21/2024-CA 19-9=290 [de-identified] : 7/15/2024-[facility] Liquid biopsy-ZHANE, TMB=1 Muts/Mb. ARID1A and TET2 mutations.  No  diagnostic alterations for [facility] liquid CDX were detected. [de-identified] : S/P port placement. Has biliary stent exchange next week on 12/3/24. No c/o N/V/D, fevers. No c/o abdominal pain. No SOB.

## 2024-11-25 NOTE — ASSESSMENT
[FreeTextEntry1] : Lab work, CT A/P report reviewed. #1) GB carcinoma-T4N1(Stage IIIC) clinically. 5/28/2024-PET/CT scan-. FDG-avid soft tissue within the ze hepatis, surrounding common bile duct stent, extending into gallbladder/gallbladder fossa, corresponds to known malignancy. FDG-avid soft tissue nodule in right parotid gland is nonspecific. Differential diagnosis includes benign and malignant salivary gland neoplasms and an intraparotid lymph node. Further evaluation may be performed with ultrasound and percutaneous needle biopsy. Indeterminate FDG-avid focus in fundus of uterus. Pelvic ultrasound/pelvic MRI may be obtained for further evaluation. -**Requested [facility], PD-L1 on pathology-insufficient tissue. 7/15/2024-[facility] Liquid biopsy-ZHANE, TMB=1 Muts/Mb. ARID1A and TET2 mutations.  No  diagnostic alterations for [employer] liquid CDX were detected. --had reviewed roles of surgery/radiation/systemic therapy in biliary tract cancers.-requires a multidisciplinary approach.  5/30/2024-Had discussed case with surgeon Dr. Allison.  He recommended neoadjuvant systemic therapy with interval follow-up imaging at 2 months, and pending this, again at 4 months for surgical decisions.  6/10/2024-Started neoadjuvant Gemcitabine/Cisplatin/Durvalumab.  Course complicated by biliary stent issues, Klebsiella bacteremia. Following treatment of infection, resumed treatment.  With creatinine improved, resumed cisplatin.  Needed to further elucidate liver lesions: 7/2/2024-CT abdomen/pelvis-IMPRESSION: Adequate positioning of the biliary stent, however there is no  pneumobilia to suggest patency. Mild intrahepatic biliary ductal  dilatation. New from 6/4/2024 are ill-defined hypodensities scattered throughout the  bilateral hepatic lobes, suggestive of metastatic disease.  7/3/24-MRI Abdomen-IMPRESSION: 1.  Focal stricture of the common hepatic duct/common biliary duct  measuring 1.6 cm with progressive enhancement, consistent with  cholangiocarcinoma. CBD stent traverse through the focal stricture. Mild  intra and extrahepatic biliary ductal dilatation. 2.  Complex solid and cystic lesion in the gallbladder fossa with areas  of thickened septations/nodular enhancement. 3.  Multiple subcentimeter T2 hyperintense foci throughout the hepatic  parenchyma and few of which in the right hepatic lobe demonstrating  peripheral rim enhancement. Although nonspecific, these are suspicious  for microabscesses.  --?Micro abscesses vs. mets 7/18/2024-MRI Abdomen-1.  Several small right hepatic lobe liver lesions are without significant change from 7/3/2024. While favored to be inflammatory from prior cholangitis (improvement on imaging can lag behind clinical improvement), metastatic disease remains possible. Follow-up MRI abdomen recommended in 6-8 weeks. 2.  Bile duct mass and gallbladder mass are without significant change. 3.  Iron deposition within the liver. 9/4/2024-MRI Abdomen-Resolution of right hepatic lesions, in keeping with infection. Bile duct mass, with associated common hepatic duct narrowing, without change. Slightly increased CBD dilatation with distal tapering. Unchanged intrahepatic biliary duct dilatation. Fundal gallbladder mass, slightly decreased in size. 11/6/2024-CT A/P-Fundal gallbladder mass is slightly decreased in size since 9/4/2024. CBD dilatation, unchanged. Known CBD mass is difficult to visualize on CT.  --clinically stable for chemo today --patient aware to have biliary stent changed 1-2 months per GI-next exchange scheduled 12/3/2024. --f/u appt. with surgeon recommended.  Telephone call with Dr. Allison and discussed updated case with him.  He stated he will order an MRI of the liver with E[facility] to better evaluate, and then make surgical decisions with patient.  #2) FDG-avid soft tissue nodule in right parotid gland nonspecific on PET/CT scan.  F/U right parotid US 7/30/2024-The right parotid gland is normal in size and echogenicity. Again noted is 8 x 7 x 5 mm heterogeneous predominantly isoechoic nodularity in the superficial midportion of the parotid gland, unchanged. Findings are nonspecific. Consider follow up ultrasound in 6 months. 9/2024-Saw ENT MD Dr. Cid. 9/30/2024-SALIVARY GLAND, PAROTID, RIGHT INFERIOR, US GUIDED FNA NON DIAGNOSTIC. Benign salivary gland tissue only ---to continue f/u with ENT MD   #3) indeterminate FDG-avid focus in fundus of uterus on PET/CT scan Pelvic US 7/30/2024-Leiomyomatous uterus. Subcentimeter bilateral simple ovarian cyst. Trace fundal endometrial fluid. --stated saw GYN MD 1/2024-will do yearly f/u as planned  Patient was given the opportunity to ask questions. Her questions have been answered to her apparent satisfaction at this time. She expressed her understanding and willingness to comply with recommended f/u.  -->Gemcitabine 1000 mg/m IV days 1 and 8, along with Cisplatin 25 mg/m IV days 1 and 8.  Cycle is to be every 21 days. Durvalumab 1500 mg IV day 1 q. 21 days with chemotherapy. Cycle #8  Day #  8 chemo today. Zarxio x 3 days post chemo. RTO 4 weeks-POF/labs then.

## 2024-11-25 NOTE — ASSESSMENT
[FreeTextEntry1] : Lab work, CT A/P report reviewed. #1) GB carcinoma-T4N1(Stage IIIC) clinically. 5/28/2024-PET/CT scan-. FDG-avid soft tissue within the ze hepatis, surrounding common bile duct stent, extending into gallbladder/gallbladder fossa, corresponds to known malignancy. FDG-avid soft tissue nodule in right parotid gland is nonspecific. Differential diagnosis includes benign and malignant salivary gland neoplasms and an intraparotid lymph node. Further evaluation may be performed with ultrasound and percutaneous needle biopsy. Indeterminate FDG-avid focus in fundus of uterus. Pelvic ultrasound/pelvic MRI may be obtained for further evaluation. -**Requested [facility], PD-L1 on pathology-insufficient tissue. 7/15/2024-[facility] Liquid biopsy-ZHANE, TMB=1 Muts/Mb. ARID1A and TET2 mutations.  No  diagnostic alterations for Uanbai liquid CDX were detected. --had reviewed roles of surgery/radiation/systemic therapy in biliary tract cancers.-requires a multidisciplinary approach.  5/30/2024-Had discussed case with surgeon Dr. Allison.  He recommended neoadjuvant systemic therapy with interval follow-up imaging at 2 months, and pending this, again at 4 months for surgical decisions.  6/10/2024-Started neoadjuvant Gemcitabine/Cisplatin/Durvalumab.  Course complicated by biliary stent issues, Klebsiella bacteremia. Following treatment of infection, resumed treatment.  With creatinine improved, resumed cisplatin.  Needed to further elucidate liver lesions: 7/2/2024-CT abdomen/pelvis-IMPRESSION: Adequate positioning of the biliary stent, however there is no  pneumobilia to suggest patency. Mild intrahepatic biliary ductal  dilatation. New from 6/4/2024 are ill-defined hypodensities scattered throughout the  bilateral hepatic lobes, suggestive of metastatic disease.  7/3/24-MRI Abdomen-IMPRESSION: 1.  Focal stricture of the common hepatic duct/common biliary duct  measuring 1.6 cm with progressive enhancement, consistent with  cholangiocarcinoma. CBD stent traverse through the focal stricture. Mild  intra and extrahepatic biliary ductal dilatation. 2.  Complex solid and cystic lesion in the gallbladder fossa with areas  of thickened septations/nodular enhancement. 3.  Multiple subcentimeter T2 hyperintense foci throughout the hepatic  parenchyma and few of which in the right hepatic lobe demonstrating  peripheral rim enhancement. Although nonspecific, these are suspicious  for microabscesses.  --?Micro abscesses vs. mets 7/18/2024-MRI Abdomen-1.  Several small right hepatic lobe liver lesions are without significant change from 7/3/2024. While favored to be inflammatory from prior cholangitis (improvement on imaging can lag behind clinical improvement), metastatic disease remains possible. Follow-up MRI abdomen recommended in 6-8 weeks. 2.  Bile duct mass and gallbladder mass are without significant change. 3.  Iron deposition within the liver. 9/4/2024-MRI Abdomen-Resolution of right hepatic lesions, in keeping with infection. Bile duct mass, with associated common hepatic duct narrowing, without change. Slightly increased CBD dilatation with distal tapering. Unchanged intrahepatic biliary duct dilatation. Fundal gallbladder mass, slightly decreased in size. 11/6/2024-CT A/P-Fundal gallbladder mass is slightly decreased in size since 9/4/2024. CBD dilatation, unchanged. Known CBD mass is difficult to visualize on CT.  --clinically stable for chemo today --patient aware to have biliary stent changed 1-2 months per GI-next exchange scheduled 12/3/2024. --f/u appt. with surgeon recommended.  Telephone call with Dr. Allison and discussed updated case with him.  He stated he will order an MRI of the liver with Eovist to better evaluate, and then make surgical decisions with patient.  #2) FDG-avid soft tissue nodule in right parotid gland nonspecific on PET/CT scan.  F/U right parotid US 7/30/2024-The right parotid gland is normal in size and echogenicity. Again noted is 8 x 7 x 5 mm heterogeneous predominantly isoechoic nodularity in the superficial midportion of the parotid gland, unchanged. Findings are nonspecific. Consider follow up ultrasound in 6 months. 9/2024-Saw ENT MD Dr. Cid. 9/30/2024-SALIVARY GLAND, PAROTID, RIGHT INFERIOR, US GUIDED FNA NON DIAGNOSTIC. Benign salivary gland tissue only ---to continue f/u with ENT MD   #3) indeterminate FDG-avid focus in fundus of uterus on PET/CT scan Pelvic US 7/30/2024-Leiomyomatous uterus. Subcentimeter bilateral simple ovarian cyst. Trace fundal endometrial fluid. --stated saw GYN MD 1/2024-will do yearly f/u as planned  Patient was given the opportunity to ask questions. Her questions have been answered to her apparent satisfaction at this time. She expressed her understanding and willingness to comply with recommended f/u.  -->Gemcitabine 1000 mg/m IV days 1 and 8, along with Cisplatin 25 mg/m IV days 1 and 8.  Cycle is to be every 21 days. Durvalumab 1500 mg IV day 1 q. 21 days with chemotherapy. Cycle #8  Day #  8 chemo today. Zarxio x 3 days post chemo. RTO 4 weeks-POF/labs then.

## 2024-12-24 NOTE — PHYSICAL EXAM
[Normal] : supple, no neck mass and thyroid not enlarged [Normal Neck Lymph Nodes] : normal neck lymph nodes  [Normal Supraclavicular Lymph Nodes] : normal supraclavicular lymph nodes [Normal Groin Lymph Nodes] : normal groin lymph nodes [Normal Axillary Lymph Nodes] : normal axillary lymph nodes [Normal] : oriented to person, place and time, with appropriate affect [de-identified] : icteric

## 2024-12-24 NOTE — HISTORY OF PRESENT ILLNESS
[de-identified] : Ms. SUSANNE ENRIQUEZ is a 73-year-old woman, w/ cholangiocarcinoma now s/p 8 cycles of NACT ([place]/Cis), here for a pre-surgical discussion.   In May 2024, Susanne presented to [facility] after outpatient labs @ urgent care showed elevated LFTs and Tbili. At that time, she also noted progressively darkening urine and pale stools for one week along with postprandial epigastric pain.   abd U/S 5/10/2024 - moderate intra & extra hepatic biliary dilatation  - abnormal appearing gallbladder (wall thickened and irregular in contour and is not clearly defined in some areas, inseparable from the liver) - abnormal soft tissue density within the distal portion of the CBD and at the ze hepatic, the constellation of findings is most concerning for possible gallbladder neoplasm w/ extension to the ze hepatis & associated biliary obstruction -> further eval w/ CT or MRI recommended   CT C/A/P 2024 - soft tissue is visualized within the ze hepatis which appears to markedly narrow the common hepatic duct, mild intrahepatic biliary ductal dilatation, CBD normal - soft tissues once again visualized within the gallbladder fundus w/ involvement of the gallbladder fossa, contact w/ the hepatic flexure & involvement of the ze hepatis -> differential dx remains cholangio & gallbladder cancer - common, proper & left hepatic artery are patent, soft tissue mass within the ze hepatis involves and attenuates the mid right hepatic artery. The distal branches of the right hepatic artery are well opacified. Remainder of the visualized arterial system is patent - no evidence of mets in chest   MR/MRCP 2024 - 1.8 x 2.2 cm mass at the hepatic duct bifurcation resulting in intrahepatic biliary ductal dilatation -> highly suspicious for cholangiocarcinoma (Klatskin tumor), EUS recommended  - a 2.5 x 3.3 cm area of septated masslike thickening of the gallbladder fundus w/ cystic changes -> could represent either masslike adenomyomatosis vs a separate gallbladder mass   labs inpatient: CEA 1.5, CA 19.9: 257, Tbili 5.7 on arrival to hospital, increased to 9.9 pre-stent and on discharge was 3.6   EUS/ERCP 2024 (Dr. Dangelo) - proximal biliary mass without involvement of the portal vein, large ze hepatic LAD & gallbladder lesion noted - ERCP notable for hilar stricture s/p sphincterotomy, dilation of the stricture to 6 mm, brushing, biopsy, cholangioscopy & PLASTIC stent placed. *repeat in 3 months for stent removal/exchange if no sx done  - cholangioscopy notable for abnormal mucosa of the proximal bile duct w/ narrowing and nodularity w/ decreased vascular pattern s/p spy bite bx ** biopsy of CBD structure c/w carcinoma, favor adeno  PMH: HLD, osteopenia, pre-DM, low calcium (intermittently) PSH:  denies  Meds:   ALL: NKA  SH:  denies tobacco or ETOH use, lives at home with her  & daughter, retired  FH: father w/ brain cancer  GYN: Menarche 14. Menopause 50. . 5 years of OCP use in the past  Last colonoscopy  and WNL  ECOG 0   2024 - Susanne is here with her  for an initial visit and to discuss recent biopsy results. She reports that her stool & urine have since returned to normal color after the stent placement. She otherwise denies any GI symptoms, appetite & weight are well maintained. She denies abd pain, nausea, vomiting or diarrhea.   2024-PET/CT scan-. FDG-avid soft tissue within the ze hepatis, surrounding common bile duct stent, extending into gallbladder/gallbladder fossa, corresponds to known malignancy. FDG-avid soft tissue nodule in right parotid gland is nonspecific. Differential diagnosis includes benign and malignant salivary gland neoplasms and an intraparotid lymph node. Further evaluation may be performed with ultrasound and percutaneous needle biopsy. Indeterminate FDG-avid focus in fundus of uterus. Pelvic ultrasound/pelvic MRI may be obtained for further evaluation.  2024 CT A/P- Interval placement of common bile duct stent with minimal decrease in mild intrahepatic biliary dilatation. Infiltrative soft tissue along the gallbladder wall and common hepatic duct consistent with tumor, not significantly changed.  Patient hospitalized from  to , s/p biliary stent placement 2024 found to have worsening elevation of LFTs, bilirubin and worsening fatigue. Patient found to have cholangitis and underwent stent exchange on 2024. Hospital course c/b worsening hyperbilirubinemia and transaminitis requiring  repeat ERCP w/ repeat biliary stent exchange on 24. Labs improved s/p repeat ERCP.  Patient had oncologic work up while inpatient. US of R parotid w/ benign findings.  US pelvis showed fibroid uterus w/ benign features, but given FDG-avid lesion, will need biopsy as outpatient.  Labs 2024: T-bili 3.5 | AST 94 |  |   2024- Patient established with Dr. Gracia with plans for neoadjuvant chemotherapy.  Plan for Gemcitabine along with Cisplatin, and pending rheumatology evaluation, tentatively plan to add durvalumab with chemotherapy.  She has now completed 2 cycles of chemotherapy.  Cycle 3 scheduled 2024.  She reports overall fatigue and diminished appetite but she is drinking Boost supplements for nutrition.  She was experiencing diarrhea initially but this resolved.  Her urine and stool color are now normal, and skin/sclera icterus is improving.    2024-CT abdomen/pelvis- Adequate positioning of the biliary stent, however there is no pneumobilia to suggest patency. Mild intrahepatic biliary ductal dilatation. New from 2024 are ill-defined hypodensities scattered throughout the bilateral hepatic lobes, suggestive of metastatic disease.  7/3/24-MRI Abdomen- Focal stricture of the common hepatic duct/common biliary duct measuring 1.6 cm with progressive enhancement, consistent with cholangiocarcinoma. CBD stent traverse through the focal stricture. Mild intra and extrahepatic biliary ductal dilatation. Complex solid and cystic lesion in the gallbladder fossa with areas of thickened septations/nodular enhancement. Multiple subcentimeter T2 hyperintense foci throughout the hepatic parenchyma and few of which in the right hepatic lobe demonstrating peripheral rim enhancement. Although nonspecific, these are suspicious for microabscesses.  2024 ERCP (Dr. Dangelo)- malignant hilar stricture, s/p stent and stone removal and insertion of right and left hepatic duct plastic stents.  Advised to return for stent exchange in 1-2 months.   2024-MRI Abdomen-Resolution of right hepatic lesions, in keeping with infection. Bile duct mass, with associated common hepatic duct narrowing, without change. Slightly increased CBD dilatation with distal tapering. Unchanged intrahepatic biliary duct dilatation. Fundal gallbladder mass, slightly decreased in size.  2024 Right parotid FNA- benign salivary gland tissue only.  2024- Susanne continues systemic therapy with Dr. Gracia, s/p C5 (gem/cis/Durvalumab).  Tolerating treatment well overall.  Has altered taste causing aversion to many foods but feels she is getting reasonable nutrition and maintaining her weight.  Currently denies any abdominal pain, fever, chills, acholic stools or dark urine. Susanne continues with neoadjuvant [place]/Cis/Durva. I am arranging a liver protocol CT of the abdomen pelvis to assist in reassessment. She will ultimately require a repeat PET/CT prior to considering surgical exploration. Ideally, she will get a few more cycles with ongoing tumor response.  liver protocol CT A/P 2024 - fundal GB mass measures 1.7 x 1.4 cm (previously 1.8 x 1.8 cm on MRI) - CBD dilatation, unchanged to 1 cm, CBD mass is difficult to visualize on CT   MR/MRCP (w/ EOVIST) 2024 - unchanged mild stricture with residual periductal ill-defined tissue at ductal confluence w/ unchanged mild intrahepatic ductal dilatation - probable unchanged residual GB tumor - unchanged cystic lesions in pancreas head, up to 4 mm, no main duct dilatation or mural nodule   2024 - Susanne is here for ongoing follow-up, she has completed 8 cycles of [place]/Cis/Durva as of 2024. She underwent a stent exchange on 12/3/24 w/ Dr. Villegas.

## 2024-12-24 NOTE — HISTORY OF PRESENT ILLNESS
[de-identified] : Ms. SUSANNE ENRIQUEZ is a 73-year-old woman, w/ cholangiocarcinoma now s/p 8 cycles of NACT ([place]/Cis), here for a pre-surgical discussion.   In May 2024, Susanne presented to [facility] after outpatient labs @ urgent care showed elevated LFTs and Tbili. At that time, she also noted progressively darkening urine and pale stools for one week along with postprandial epigastric pain.   abd U/S 5/10/2024 - moderate intra & extra hepatic biliary dilatation  - abnormal appearing gallbladder (wall thickened and irregular in contour and is not clearly defined in some areas, inseparable from the liver) - abnormal soft tissue density within the distal portion of the CBD and at the ze hepatic, the constellation of findings is most concerning for possible gallbladder neoplasm w/ extension to the ze hepatis & associated biliary obstruction -> further eval w/ CT or MRI recommended   CT C/A/P 2024 - soft tissue is visualized within the ze hepatis which appears to markedly narrow the common hepatic duct, mild intrahepatic biliary ductal dilatation, CBD normal - soft tissues once again visualized within the gallbladder fundus w/ involvement of the gallbladder fossa, contact w/ the hepatic flexure & involvement of the ze hepatis -> differential dx remains cholangio & gallbladder cancer - common, proper & left hepatic artery are patent, soft tissue mass within the ze hepatis involves and attenuates the mid right hepatic artery. The distal branches of the right hepatic artery are well opacified. Remainder of the visualized arterial system is patent - no evidence of mets in chest   MR/MRCP 2024 - 1.8 x 2.2 cm mass at the hepatic duct bifurcation resulting in intrahepatic biliary ductal dilatation -> highly suspicious for cholangiocarcinoma (Klatskin tumor), EUS recommended  - a 2.5 x 3.3 cm area of septated masslike thickening of the gallbladder fundus w/ cystic changes -> could represent either masslike adenomyomatosis vs a separate gallbladder mass   labs inpatient: CEA 1.5, CA 19.9: 257, Tbili 5.7 on arrival to hospital, increased to 9.9 pre-stent and on discharge was 3.6   EUS/ERCP 2024 (Dr. Dangelo) - proximal biliary mass without involvement of the portal vein, large ze hepatic LAD & gallbladder lesion noted - ERCP notable for hilar stricture s/p sphincterotomy, dilation of the stricture to 6 mm, brushing, biopsy, cholangioscopy & PLASTIC stent placed. *repeat in 3 months for stent removal/exchange if no sx done  - cholangioscopy notable for abnormal mucosa of the proximal bile duct w/ narrowing and nodularity w/ decreased vascular pattern s/p spy bite bx ** biopsy of CBD structure c/w carcinoma, favor adeno  PMH: HLD, osteopenia, pre-DM, low calcium (intermittently) PSH:  denies  Meds:   ALL: NKA  SH:  denies tobacco or ETOH use, lives at home with her  & daughter, retired  FH: father w/ brain cancer  GYN: Menarche 14. Menopause 50. . 5 years of OCP use in the past  Last colonoscopy  and WNL  ECOG 0   2024 - Susanne is here with her  for an initial visit and to discuss recent biopsy results. She reports that her stool & urine have since returned to normal color after the stent placement. She otherwise denies any GI symptoms, appetite & weight are well maintained. She denies abd pain, nausea, vomiting or diarrhea.   2024-PET/CT scan-. FDG-avid soft tissue within the ze hepatis, surrounding common bile duct stent, extending into gallbladder/gallbladder fossa, corresponds to known malignancy. FDG-avid soft tissue nodule in right parotid gland is nonspecific. Differential diagnosis includes benign and malignant salivary gland neoplasms and an intraparotid lymph node. Further evaluation may be performed with ultrasound and percutaneous needle biopsy. Indeterminate FDG-avid focus in fundus of uterus. Pelvic ultrasound/pelvic MRI may be obtained for further evaluation.  2024 CT A/P- Interval placement of common bile duct stent with minimal decrease in mild intrahepatic biliary dilatation. Infiltrative soft tissue along the gallbladder wall and common hepatic duct consistent with tumor, not significantly changed.  Patient hospitalized from  to , s/p biliary stent placement 2024 found to have worsening elevation of LFTs, bilirubin and worsening fatigue. Patient found to have cholangitis and underwent stent exchange on 2024. Hospital course c/b worsening hyperbilirubinemia and transaminitis requiring  repeat ERCP w/ repeat biliary stent exchange on 24. Labs improved s/p repeat ERCP.  Patient had oncologic work up while inpatient. US of R parotid w/ benign findings.  US pelvis showed fibroid uterus w/ benign features, but given FDG-avid lesion, will need biopsy as outpatient.  Labs 2024: T-bili 3.5 | AST 94 |  |   2024- Patient established with Dr. Gracia with plans for neoadjuvant chemotherapy.  Plan for Gemcitabine along with Cisplatin, and pending rheumatology evaluation, tentatively plan to add durvalumab with chemotherapy.  She has now completed 2 cycles of chemotherapy.  Cycle 3 scheduled 2024.  She reports overall fatigue and diminished appetite but she is drinking Boost supplements for nutrition.  She was experiencing diarrhea initially but this resolved.  Her urine and stool color are now normal, and skin/sclera icterus is improving.    2024-CT abdomen/pelvis- Adequate positioning of the biliary stent, however there is no pneumobilia to suggest patency. Mild intrahepatic biliary ductal dilatation. New from 2024 are ill-defined hypodensities scattered throughout the bilateral hepatic lobes, suggestive of metastatic disease.  7/3/24-MRI Abdomen- Focal stricture of the common hepatic duct/common biliary duct measuring 1.6 cm with progressive enhancement, consistent with cholangiocarcinoma. CBD stent traverse through the focal stricture. Mild intra and extrahepatic biliary ductal dilatation. Complex solid and cystic lesion in the gallbladder fossa with areas of thickened septations/nodular enhancement. Multiple subcentimeter T2 hyperintense foci throughout the hepatic parenchyma and few of which in the right hepatic lobe demonstrating peripheral rim enhancement. Although nonspecific, these are suspicious for microabscesses.  2024 ERCP (Dr. Dangelo)- malignant hilar stricture, s/p stent and stone removal and insertion of right and left hepatic duct plastic stents.  Advised to return for stent exchange in 1-2 months.   2024-MRI Abdomen-Resolution of right hepatic lesions, in keeping with infection. Bile duct mass, with associated common hepatic duct narrowing, without change. Slightly increased CBD dilatation with distal tapering. Unchanged intrahepatic biliary duct dilatation. Fundal gallbladder mass, slightly decreased in size.  2024 Right parotid FNA- benign salivary gland tissue only.  2024- Susanne continues systemic therapy with Dr. Gracia, s/p C5 (gem/cis/Durvalumab).  Tolerating treatment well overall.  Has altered taste causing aversion to many foods but feels she is getting reasonable nutrition and maintaining her weight.  Currently denies any abdominal pain, fever, chills, acholic stools or dark urine. Susanne continues with neoadjuvant [place]olls/Cis/Durva. I am arranging a liver protocol CT of the abdomen pelvis to assist in reassessment. She will ultimately require a repeat PET/CT prior to considering surgical exploration. Ideally, she will get a few more cycles with ongoing tumor response.  liver protocol CT A/P 2024 - fundal GB mass measures 1.7 x 1.4 cm (previously 1.8 x 1.8 cm on MRI) - CBD dilatation, unchanged to 1 cm, CBD mass is difficult to visualize on CT   MR/MRCP (w/ EOVIST) 2024 - unchanged mild stricture with residual periductal ill-defined tissue at ductal confluence w/ unchanged mild intrahepatic ductal dilatation - probable unchanged residual GB tumor - unchanged cystic lesions in pancreas head, up to 4 mm, no main duct dilatation or mural nodule   2024 - Susanne is here for ongoing follow-up, she has completed 8 cycles of [place]olls/Cis/Durva as of 2024. She underwent a stent exchange on 12/3/24 w/ Dr. Villegas.

## 2024-12-24 NOTE — PHYSICAL EXAM
[Normal] : supple, no neck mass and thyroid not enlarged [Normal Neck Lymph Nodes] : normal neck lymph nodes  [Normal Supraclavicular Lymph Nodes] : normal supraclavicular lymph nodes [Normal Groin Lymph Nodes] : normal groin lymph nodes [Normal Axillary Lymph Nodes] : normal axillary lymph nodes [Normal] : oriented to person, place and time, with appropriate affect [de-identified] : icteric

## 2024-12-24 NOTE — ASSESSMENT
[FreeTextEntry1] :    All medical entries were at my, Dr. Eduardo Allison, direction. I have reviewed the chart and agree that the record accurately reflects my personal performance of the history, physical exam, assessment, and plan.  Our office nurse practitioner was present for the duration of the office visit.

## 2024-12-24 NOTE — CONSULT LETTER
[Dear  ___] : Dear  [unfilled], [Consult Letter:] : I had the pleasure of evaluating your patient, [unfilled]. [Please see my note below.] : Please see my note below. [Consult Closing:] : Thank you very much for allowing me to participate in the care of this patient.  If you have any questions, please do not hesitate to contact me. [Sincerely,] : Sincerely, [DrArmin  ___] : Dr. BAPTISTE [FreeTextEntry2] : Jania Lisa MD [FreeTextEntry3] : Eduardo Allison MD Surgical Oncology [facility] Office: [phone] Cell: [phone]

## 2025-01-15 NOTE — ASSESSMENT
[FreeTextEntry1] : Lab work, PET/CT report, 12/24/24 surgical oncology note, MRI abdomen report reviewed. #1) GB carcinoma-T4N1(Stage IIIC) clinically. 5/28/2024-PET/CT scan-. FDG-avid soft tissue within the ze hepatis, surrounding common bile duct stent, extending into gallbladder/gallbladder fossa, corresponds to known malignancy. FDG-avid soft tissue nodule in right parotid gland is nonspecific. Differential diagnosis includes benign and malignant salivary gland neoplasms and an intraparotid lymph node. Further evaluation may be performed with ultrasound and percutaneous needle biopsy. Indeterminate FDG-avid focus in fundus of uterus. Pelvic ultrasound/pelvic MRI may be obtained for further evaluation. -**Requested [employer], PD-L1 on pathology-insufficient tissue. 7/15/2024-[facility] Liquid biopsy-ZHANE, TMB=1 Muts/Mb. ARID1A and TET2 mutations.  No  diagnostic alterations for [employer] liquid CDX were detected. --had reviewed roles of surgery/radiation/systemic therapy in biliary tract cancers.-requires a multidisciplinary approach.  5/30/2024-Had discussed case with surgeon Dr. Allison.  He recommended neoadjuvant systemic therapy with interval follow-up imaging at 2 months, and pending this, again at 4 months for surgical decisions.  6/10/2024-Started neoadjuvant Gemcitabine/Cisplatin/Durvalumab.  Course complicated by biliary stent issues, Klebsiella bacteremia. Following treatment of infection, resumed treatment.  With creatinine improved, resumed cisplatin.  Needed to further elucidate liver lesions: 7/2/2024-CT abdomen/pelvis-IMPRESSION: Adequate positioning of the biliary stent, however there is no  pneumobilia to suggest patency. Mild intrahepatic biliary ductal  dilatation. New from 6/4/2024 are ill-defined hypodensities scattered throughout the  bilateral hepatic lobes, suggestive of metastatic disease.  7/3/24-MRI Abdomen-IMPRESSION: 1.  Focal stricture of the common hepatic duct/common biliary duct  measuring 1.6 cm with progressive enhancement, consistent with  cholangiocarcinoma. CBD stent traverse through the focal stricture. Mild  intra and extrahepatic biliary ductal dilatation. 2.  Complex solid and cystic lesion in the gallbladder fossa with areas  of thickened septations/nodular enhancement. 3.  Multiple subcentimeter T2 hyperintense foci throughout the hepatic  parenchyma and few of which in the right hepatic lobe demonstrating  peripheral rim enhancement. Although nonspecific, these are suspicious  for microabscesses.  --?Micro abscesses vs. mets 7/18/2024-MRI Abdomen-1.  Several small right hepatic lobe liver lesions are without significant change from 7/3/2024. While favored to be inflammatory from prior cholangitis (improvement on imaging can lag behind clinical improvement), metastatic disease remains possible. Follow-up MRI abdomen recommended in 6-8 weeks. 2.  Bile duct mass and gallbladder mass are without significant change. 3.  Iron deposition within the liver. 9/4/2024-MRI Abdomen-Resolution of right hepatic lesions, in keeping with infection. Bile duct mass, with associated common hepatic duct narrowing, without change. Slightly increased CBD dilatation with distal tapering. Unchanged intrahepatic biliary duct dilatation. Fundal gallbladder mass, slightly decreased in size. 11/6/2024-CT A/P-Fundal gallbladder mass is slightly decreased in size since 9/4/2024. CBD dilatation, unchanged. Known CBD mass is difficult to visualize on CT.  11/25/2024-Completed 8th cycle Gemcitabine/Cisplatin/Durvalumab.  12/18/2024-MRI abdomen/MRCP-. Since September 4, 2024, unchanged residual stricture at biliary ductal confluence and unchanged residual gallbladder tumor. No new suspicious finding. 1/2/2025-PET/CT scan-Compared to FDG PET/CT dated 5/28/2024: 1. FDG-avid lesion in ze hepatis adjacent to biliary stent and FDG-avid focus within the gallbladder, not well delineated on CT, are decreased in size and mildly decreased in metabolism, compatible with a partial response to interval therapy. 2. Resolution of small FDG-avid focus in right parotid region. 3. Resolution of FDG-avid focus in fundus of uterus. 4. No new lesion. --clinically stable at present --undergoing eval. for surgery which is scheduled  #2) FDG-avid soft tissue nodule in right parotid gland nonspecific on PET/CT scan.  F/U right parotid US 7/30/2024-The right parotid gland is normal in size and echogenicity. Again noted is 8 x 7 x 5 mm heterogeneous predominantly isoechoic nodularity in the superficial midportion of the parotid gland, unchanged. Findings are nonspecific. Consider follow up ultrasound in 6 months. 9/2024-Saw ENT MD Dr. Cid. 9/30/2024-SALIVARY GLAND, PAROTID, RIGHT INFERIOR, US GUIDED FNA NON DIAGNOSTIC. Benign salivary gland tissue only Resolution of small FDG-avid focus in right parotid region on PET/CT scan 12/24/2024. ---continue f/u with ENT MD   #3) indeterminate FDG-avid focus in fundus of uterus on PET/CT scan Pelvic US 7/30/2024-Leiomyomatous uterus. Subcentimeter bilateral simple ovarian cyst. Trace fundal endometrial fluid. Resolution of FDG-avid focus in fundus of uterus on 12/24/2024 PET/CT scan. --has seen GYN MD 1/2024-will do yearly f/u as planned  Patient was given the opportunity to ask questions. Her questions have been answered to her apparent satisfaction at this time. She expressed her understanding and willingness to comply with recommended f/u.  -->RTO ~2-3 weeks post-op. POF/labs.

## 2025-01-15 NOTE — ASSESSMENT
[FreeTextEntry1] : Lab work, PET/CT report, 12/24/24 surgical oncology note, MRI abdomen report reviewed. #1) GB carcinoma-T4N1(Stage IIIC) clinically. 5/28/2024-PET/CT scan-. FDG-avid soft tissue within the ze hepatis, surrounding common bile duct stent, extending into gallbladder/gallbladder fossa, corresponds to known malignancy. FDG-avid soft tissue nodule in right parotid gland is nonspecific. Differential diagnosis includes benign and malignant salivary gland neoplasms and an intraparotid lymph node. Further evaluation may be performed with ultrasound and percutaneous needle biopsy. Indeterminate FDG-avid focus in fundus of uterus. Pelvic ultrasound/pelvic MRI may be obtained for further evaluation. -**Requested [employer], PD-L1 on pathology-insufficient tissue. 7/15/2024-[facility] Liquid biopsy-ZHANE, TMB=1 Muts/Mb. ARID1A and TET2 mutations.  No  diagnostic alterations for [employer] liquid CDX were detected. --had reviewed roles of surgery/radiation/systemic therapy in biliary tract cancers.-requires a multidisciplinary approach.  5/30/2024-Had discussed case with surgeon Dr. Allison.  He recommended neoadjuvant systemic therapy with interval follow-up imaging at 2 months, and pending this, again at 4 months for surgical decisions.  6/10/2024-Started neoadjuvant Gemcitabine/Cisplatin/Durvalumab.  Course complicated by biliary stent issues, Klebsiella bacteremia. Following treatment of infection, resumed treatment.  With creatinine improved, resumed cisplatin.  Needed to further elucidate liver lesions: 7/2/2024-CT abdomen/pelvis-IMPRESSION: Adequate positioning of the biliary stent, however there is no  pneumobilia to suggest patency. Mild intrahepatic biliary ductal  dilatation. New from 6/4/2024 are ill-defined hypodensities scattered throughout the  bilateral hepatic lobes, suggestive of metastatic disease.  7/3/24-MRI Abdomen-IMPRESSION: 1.  Focal stricture of the common hepatic duct/common biliary duct  measuring 1.6 cm with progressive enhancement, consistent with  cholangiocarcinoma. CBD stent traverse through the focal stricture. Mild  intra and extrahepatic biliary ductal dilatation. 2.  Complex solid and cystic lesion in the gallbladder fossa with areas  of thickened septations/nodular enhancement. 3.  Multiple subcentimeter T2 hyperintense foci throughout the hepatic  parenchyma and few of which in the right hepatic lobe demonstrating  peripheral rim enhancement. Although nonspecific, these are suspicious  for microabscesses.  --?Micro abscesses vs. mets 7/18/2024-MRI Abdomen-1.  Several small right hepatic lobe liver lesions are without significant change from 7/3/2024. While favored to be inflammatory from prior cholangitis (improvement on imaging can lag behind clinical improvement), metastatic disease remains possible. Follow-up MRI abdomen recommended in 6-8 weeks. 2.  Bile duct mass and gallbladder mass are without significant change. 3.  Iron deposition within the liver. 9/4/2024-MRI Abdomen-Resolution of right hepatic lesions, in keeping with infection. Bile duct mass, with associated common hepatic duct narrowing, without change. Slightly increased CBD dilatation with distal tapering. Unchanged intrahepatic biliary duct dilatation. Fundal gallbladder mass, slightly decreased in size. 11/6/2024-CT A/P-Fundal gallbladder mass is slightly decreased in size since 9/4/2024. CBD dilatation, unchanged. Known CBD mass is difficult to visualize on CT.  11/25/2024-Completed 8th cycle Gemcitabine/Cisplatin/Durvalumab.  12/18/2024-MRI abdomen/MRCP-. Since September 4, 2024, unchanged residual stricture at biliary ductal confluence and unchanged residual gallbladder tumor. No new suspicious finding. 1/2/2025-PET/CT scan-Compared to FDG PET/CT dated 5/28/2024: 1. FDG-avid lesion in ze hepatis adjacent to biliary stent and FDG-avid focus within the gallbladder, not well delineated on CT, are decreased in size and mildly decreased in metabolism, compatible with a partial response to interval therapy. 2. Resolution of small FDG-avid focus in right parotid region. 3. Resolution of FDG-avid focus in fundus of uterus. 4. No new lesion. --clinically stable at present --undergoing eval. for surgery which is scheduled  #2) FDG-avid soft tissue nodule in right parotid gland nonspecific on PET/CT scan.  F/U right parotid US 7/30/2024-The right parotid gland is normal in size and echogenicity. Again noted is 8 x 7 x 5 mm heterogeneous predominantly isoechoic nodularity in the superficial midportion of the parotid gland, unchanged. Findings are nonspecific. Consider follow up ultrasound in 6 months. 9/2024-Saw ENT MD Dr. Cid. 9/30/2024-SALIVARY GLAND, PAROTID, RIGHT INFERIOR, US GUIDED FNA NON DIAGNOSTIC. Benign salivary gland tissue only Resolution of small FDG-avid focus in right parotid region on PET/CT scan 12/24/2024. ---continue f/u with ENT MD   #3) indeterminate FDG-avid focus in fundus of uterus on PET/CT scan Pelvic US 7/30/2024-Leiomyomatous uterus. Subcentimeter bilateral simple ovarian cyst. Trace fundal endometrial fluid. Resolution of FDG-avid focus in fundus of uterus on 12/24/2024 PET/CT scan. --has seen GYN MD 1/2024-will do yearly f/u as planned  Patient was given the opportunity to ask questions. Her questions have been answered to her apparent satisfaction at this time. She expressed her understanding and willingness to comply with recommended f/u.  -->RTO ~2-3 weeks post-op. POF/labs.

## 2025-01-15 NOTE — HISTORY OF PRESENT ILLNESS
[Disease: _____________________] : Disease: [unfilled] [de-identified] : 5/2024-Patient presented to [facility] with elevated LFTs. At that time, she also noted progressively darkening urine and pale stools for one week along with postprandial epigastric pain. 5/10/2024-Abdominal ultrasound-moderate intrahepatic and extrahepatic biliary dilatation.  Abnormal appearing gallbladder.  Abnormal soft tissue density within the distal portion of the common bile duct and at the ze hepatitis.  The constellation of findings is most concerning for possible gallbladder neoplasm with extension to the ze habitus and associated biliary obstruction.  5/11/2024-CT abdomen/pelvis-gallbladder mass and soft tissue within the ze hepatis with enlarged portal caval node.  Soft tissue involvement of the biliary ducts and vasculature.  No evidence of metastatic disease within the chest. 5/11/2024 MR/MRCP-there is an approximately 2.2 cm obstructing mass centered at the bifurcation of the common hepatic duct with intrahepatic biliary duct dilatation, highly suspicious for cholangiocarcinoma (Klatskin tumor).  Masslike appearance of the gallbladder fundus with cystic changes measuring approximately 3.3 cm, which could represent either masslike adenoma I will mitosis versus a separate gallbladder mass.  No evidence of metastatic disease within the abdomen.  5/14/2024-EUS/ERCP 5/14/2024 (Dr. Dangelo) - proximal biliary mass without involvement of the portal vein, large ze hepatic LAD & gallbladder lesion noted - ERCP notable for hilar stricture s/p sphincterotomy, dilation of the stricture to 6 mm, brushing, biopsy, cholangioscopy & PLASTIC stent placed. *repeat in 3 months for stent removal/exchange if no sx done - cholangioscopy notable for abnormal mucosa of the proximal bile duct w/ narrowing and nodularity w/ decreased vascular pattern s/p spy bite bx ** biopsy of CBD structure c/w carcinoma, favor adeno Common bile duct stricture biopsy-small clusters of atypical cells, detached were within stroma, compatible with carcinoma and favor adenocarcinoma. Bile duct mass biopsy-minute fragments of fibrous tissue with marked crush artifact and rare, atypical cells singly or in small clusters.  5/28/2024-PET/CT scan- FDG-avid soft tissue within the ze hepatis, surrounding common bile duct stent, extending into gallbladder/gallbladder fossa, corresponds to known malignancy. FDG-avid soft tissue nodule in right parotid gland is nonspecific. Differential diagnosis includes benign and malignant salivary gland neoplasms and an intraparotid lymph node. Further evaluation may be performed with ultrasound and percutaneous needle biopsy. Indeterminate FDG-avid focus in fundus of uterus. Pelvic ultrasound/pelvic MRI may be obtained for further evaluation.  6/10/2024-Started neoadjuvant Gemcitabine/Cisplatin/Durvalumab.  Course complicated by biliary stent issues, Klebsiella bacteremia (7/1/2024).  7/2/2024-CT abdomen/pelvis-IMPRESSION: Adequate positioning of the biliary stent, however there is no  pneumobilia to suggest patency. Mild intrahepatic biliary ductal  dilatation. New from 6/4/2024 are ill-defined hypodensities scattered throughout the  bilateral hepatic lobes, suggestive of metastatic disease.  7/3/24-MRI Abdomen-IMPRESSION: 1.  Focal stricture of the common hepatic duct/common biliary duct  measuring 1.6 cm with progressive enhancement, consistent with  cholangiocarcinoma. CBD stent traverse through the focal stricture. Mild  intra and extrahepatic biliary ductal dilatation. 2.  Complex solid and cystic lesion in the gallbladder fossa with areas  of thickened septations/nodular enhancement. 3.  Multiple subcentimeter T2 hyperintense foci throughout the hepatic  parenchyma and few of which in the right hepatic lobe demonstrating  peripheral rim enhancement. Although nonspecific, these are suspicious  for microabscesses.  9/4/2024-MRI Abdomen-Resolution of right hepatic lesions, in keeping with infection. Bile duct mass, with associated common hepatic duct narrowing, without change. Slightly increased CBD dilatation with distal tapering. Unchanged intrahepatic biliary duct dilatation. Fundal gallbladder mass, slightly decreased in size. 11/6/2024-CT A/P-Fundal gallbladder mass is slightly decreased in size since 9/4/2024. CBD dilatation, unchanged. Known CBD mass is difficult to visualize on CT.  11/25/2024-Completed 8th cycle Gemcitabine/Cisplatin/Durvalumab.  12/18/2024-MRI abdomen/MRCP-. Since September 4, 2024, unchanged residual stricture at biliary ductal confluence and unchanged residual gallbladder tumor. No new suspicious finding.  1/2/2025-PET/CT scan-Compared to FDG PET/CT dated 5/28/2024: 1. FDG-avid lesion in ze hepatis adjacent to biliary stent and FDG-avid focus within the gallbladder, not well delineated on CT, are decreased in size and mildly decreased in metabolism, compatible with a partial response to interval therapy. 2. Resolution of small FDG-avid focus in right parotid region. 3. Resolution of FDG-avid focus in fundus of uterus. 4. No new lesion. [de-identified] : Adenocarcinoma [de-identified] : 5/21/2024-CA 19-9=290 [de-identified] : 7/15/2024-[employer] Liquid biopsy-ZHANE, TMB=1 Muts/Mb. ARID1A and TET2 mutations.  No  diagnostic alterations for [employer] liquid CDX were detected. [de-identified] : Surgery scheduled 1/24/25. Overall, feels well. No c/o N/V/D, fevers. No c/o abdominal pain. No SOB.

## 2025-01-15 NOTE — HISTORY OF PRESENT ILLNESS
[Disease: _____________________] : Disease: [unfilled] [de-identified] : 5/2024-Patient presented to [facility] with elevated LFTs. At that time, she also noted progressively darkening urine and pale stools for one week along with postprandial epigastric pain. 5/10/2024-Abdominal ultrasound-moderate intrahepatic and extrahepatic biliary dilatation.  Abnormal appearing gallbladder.  Abnormal soft tissue density within the distal portion of the common bile duct and at the ze hepatitis.  The constellation of findings is most concerning for possible gallbladder neoplasm with extension to the ze habitus and associated biliary obstruction.  5/11/2024-CT abdomen/pelvis-gallbladder mass and soft tissue within the ze hepatis with enlarged portal caval node.  Soft tissue involvement of the biliary ducts and vasculature.  No evidence of metastatic disease within the chest. 5/11/2024 MR/MRCP-there is an approximately 2.2 cm obstructing mass centered at the bifurcation of the common hepatic duct with intrahepatic biliary duct dilatation, highly suspicious for cholangiocarcinoma (Klatskin tumor).  Masslike appearance of the gallbladder fundus with cystic changes measuring approximately 3.3 cm, which could represent either masslike adenoma I will mitosis versus a separate gallbladder mass.  No evidence of metastatic disease within the abdomen.  5/14/2024-EUS/ERCP 5/14/2024 (Dr. Dangelo) - proximal biliary mass without involvement of the portal vein, large ze hepatic LAD & gallbladder lesion noted - ERCP notable for hilar stricture s/p sphincterotomy, dilation of the stricture to 6 mm, brushing, biopsy, cholangioscopy & PLASTIC stent placed. *repeat in 3 months for stent removal/exchange if no sx done - cholangioscopy notable for abnormal mucosa of the proximal bile duct w/ narrowing and nodularity w/ decreased vascular pattern s/p spy bite bx ** biopsy of CBD structure c/w carcinoma, favor adeno Common bile duct stricture biopsy-small clusters of atypical cells, detached were within stroma, compatible with carcinoma and favor adenocarcinoma. Bile duct mass biopsy-minute fragments of fibrous tissue with marked crush artifact and rare, atypical cells singly or in small clusters.  5/28/2024-PET/CT scan- FDG-avid soft tissue within the ze hepatis, surrounding common bile duct stent, extending into gallbladder/gallbladder fossa, corresponds to known malignancy. FDG-avid soft tissue nodule in right parotid gland is nonspecific. Differential diagnosis includes benign and malignant salivary gland neoplasms and an intraparotid lymph node. Further evaluation may be performed with ultrasound and percutaneous needle biopsy. Indeterminate FDG-avid focus in fundus of uterus. Pelvic ultrasound/pelvic MRI may be obtained for further evaluation.  6/10/2024-Started neoadjuvant Gemcitabine/Cisplatin/Durvalumab.  Course complicated by biliary stent issues, Klebsiella bacteremia (7/1/2024).  7/2/2024-CT abdomen/pelvis-IMPRESSION: Adequate positioning of the biliary stent, however there is no  pneumobilia to suggest patency. Mild intrahepatic biliary ductal  dilatation. New from 6/4/2024 are ill-defined hypodensities scattered throughout the  bilateral hepatic lobes, suggestive of metastatic disease.  7/3/24-MRI Abdomen-IMPRESSION: 1.  Focal stricture of the common hepatic duct/common biliary duct  measuring 1.6 cm with progressive enhancement, consistent with  cholangiocarcinoma. CBD stent traverse through the focal stricture. Mild  intra and extrahepatic biliary ductal dilatation. 2.  Complex solid and cystic lesion in the gallbladder fossa with areas  of thickened septations/nodular enhancement. 3.  Multiple subcentimeter T2 hyperintense foci throughout the hepatic  parenchyma and few of which in the right hepatic lobe demonstrating  peripheral rim enhancement. Although nonspecific, these are suspicious  for microabscesses.  9/4/2024-MRI Abdomen-Resolution of right hepatic lesions, in keeping with infection. Bile duct mass, with associated common hepatic duct narrowing, without change. Slightly increased CBD dilatation with distal tapering. Unchanged intrahepatic biliary duct dilatation. Fundal gallbladder mass, slightly decreased in size. 11/6/2024-CT A/P-Fundal gallbladder mass is slightly decreased in size since 9/4/2024. CBD dilatation, unchanged. Known CBD mass is difficult to visualize on CT.  11/25/2024-Completed 8th cycle Gemcitabine/Cisplatin/Durvalumab.  12/18/2024-MRI abdomen/MRCP-. Since September 4, 2024, unchanged residual stricture at biliary ductal confluence and unchanged residual gallbladder tumor. No new suspicious finding.  1/2/2025-PET/CT scan-Compared to FDG PET/CT dated 5/28/2024: 1. FDG-avid lesion in ze hepatis adjacent to biliary stent and FDG-avid focus within the gallbladder, not well delineated on CT, are decreased in size and mildly decreased in metabolism, compatible with a partial response to interval therapy. 2. Resolution of small FDG-avid focus in right parotid region. 3. Resolution of FDG-avid focus in fundus of uterus. 4. No new lesion. [de-identified] : Adenocarcinoma [de-identified] : 5/21/2024-CA 19-9=290 [de-identified] : 7/15/2024-[employer] Liquid biopsy-ZHANE, TMB=1 Muts/Mb. ARID1A and TET2 mutations.  No  diagnostic alterations for [employer] liquid CDX were detected. [de-identified] : Surgery scheduled 1/24/25. Overall, feels well. No c/o N/V/D, fevers. No c/o abdominal pain. No SOB.

## 2025-01-17 NOTE — HISTORY OF PRESENT ILLNESS
[FreeTextEntry1] :  2025. 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 PMH of cholangiocarcinoma (Klatskin tumor), fibroids, osteoporosis, HLD  No vaginal bleeding, discharge or vaginitis symptoms. She denies abdominal or pelvic pain. No urinary complaints. BM is normal per patient.  Pt was previously sexually active with men, no longer active at this time.   She recently had a heme/onc follow up after [facility] hospitalization due to epigastric pain and darkened urine. Diagnosed with cholangiocarcinoma (Klatskin tumor), surgery scheduled 2025.   OBHx: - TOP   GynHx: fibroids  PMH: cholangiocarcinoma, kidney stones, HLD  SHx: ERCP, sphincterotomy, cholangioscopy & plastic stent placement  Meds: Rosuvastatin, VitD   All: NKDA  Soc: No alcohol, drug, or tobacco use, non-smoker.  Psych: negative  FHx: Denies FHx of breast, ovarian, uterine or colon cancer. Father w/ brain ca. Mother w/ DM, HTN.   PHQ9=0 [Patient reported colonoscopy was normal] : Patient reported colonoscopy was normal [Mammogramdate] : 11/2024 [TextBox_19] : BIRADS 2 [PapSmeardate] : 05/2023 [TextBox_31] : NILM, HRHPV neg [BoneDensityDate] : 05/2023 [TextBox_37] : osteoporosis [ColonoscopyDate] : 2019

## 2025-01-17 NOTE — PHYSICAL EXAM
[Chaperone Present] : A chaperone was present in the examining room during all aspects of the physical examination [[id number]] : A chaperone was present during the pelvic exam. [Appropriately responsive] : appropriately responsive [Alert] : alert [No Acute Distress] : no acute distress [No Lymphadenopathy] : no lymphadenopathy [Regular Rate Rhythm] : regular rate rhythm [No Murmurs] : no murmurs [Clear to Auscultation B/L] : clear to auscultation bilaterally [Soft] : soft [Non-tender] : non-tender [Non-distended] : non-distended [No HSM] : No HSM [No Lesions] : no lesions [No Mass] : no mass [Oriented x3] : oriented x3 [Examination Of The Breasts] : a normal appearance [No Masses] : no breast masses were palpable [Vulvar Atrophy] : vulvar atrophy [Labia Majora] : normal [Labia Minora] : normal [Atrophy] : atrophy [Normal] : normal [Uterine Adnexae] : normal [FreeTextEntry2] :  Greta loja) [FreeTextEntry9] :  Guaiac test negative, no masses noted

## 2025-01-17 NOTE — PLAN
[FreeTextEntry1] :  #HCM  -Breast self exam reviewed and taught  -STI Screening today declined  -Pap/HPV today  -Rx for mammogram and breast sonogram given for 11/2025  -Advised pt to schedule colonoscopy  -Immunizations: UTD   #Hx of fibroids -asymptomatic -TVUS to be scheduled  #Osteoporosis: -Recommend adequate dietary calcium intake (1000 mg/day for those 19-49yo and 1200 mg/day in older women) and vitamin D supplementation (600 IU/day <69 yo, 800 IU/day 69 yo and older ) -Increase weight bearing exercise for 30 min at least 3x weekly -Advised pt to see endocrinologist to manage osteoporosis, pt will consider after next DEXA -DEXA today to be scheduled    RTO in 1 year for annual or PRN for any GYN complaints   GUERLINE Rankin MD

## 2025-01-22 NOTE — PHYSICAL EXAM
[No Acute Distress] : no acute distress [Well Nourished] : well nourished [Well Developed] : well developed [Normal Sclera/Conjunctiva] : normal sclera/conjunctiva [PERRL] : pupils equal round and reactive to light [EOMI] : extraocular movements intact [Normal Outer Ear/Nose] : the outer ears and nose were normal in appearance [Normal Oropharynx] : the oropharynx was normal [Normal TMs] : both tympanic membranes were normal [Normal Nasal Mucosa] : the nasal mucosa was normal [No JVD] : no jugular venous distention [No Lymphadenopathy] : no lymphadenopathy [Supple] : supple [No Respiratory Distress] : no respiratory distress  [Clear to Auscultation] : lungs were clear to auscultation bilaterally [Normal Rate] : normal rate  [Regular Rhythm] : with a regular rhythm [Normal S1, S2] : normal S1 and S2 [No Carotid Bruits] : no carotid bruits [Pedal Pulses Present] : the pedal pulses are present [No Edema] : there was no peripheral edema [No Extremity Clubbing/Cyanosis] : no extremity clubbing/cyanosis [Soft] : abdomen soft [Non Tender] : non-tender [Non-distended] : non-distended [Normal Bowel Sounds] : normal bowel sounds [Normal Supraclavicular Nodes] : no supraclavicular lymphadenopathy [Normal Posterior Cervical Nodes] : no posterior cervical lymphadenopathy [Normal Anterior Cervical Nodes] : no anterior cervical lymphadenopathy [No CVA Tenderness] : no CVA  tenderness [No Spinal Tenderness] : no spinal tenderness [No Joint Swelling] : no joint swelling [Grossly Normal Strength/Tone] : grossly normal strength/tone [No Rash] : no rash [Coordination Grossly Intact] : coordination grossly intact [No Focal Deficits] : no focal deficits [Normal Gait] : normal gait [Speech Grossly Normal] : speech grossly normal [Normal Affect] : the affect was normal [Alert and Oriented x3] : oriented to person, place, and time [Normal Mood] : the mood was normal [de-identified] : female in stated age,

## 2025-01-22 NOTE — REVIEW OF SYSTEMS
[Recent Change In Weight] : ~T recent weight change [Nocturia] : nocturia [Negative] : Heme/Lymph [Fever] : no fever [Chills] : no chills [Fatigue] : no fatigue [Chest Pain] : no chest pain [Palpitations] : no palpitations [Lower Ext Edema] : no lower extremity edema [Shortness Of Breath] : no shortness of breath [Wheezing] : no wheezing [Cough] : no cough [Dyspnea on Exertion] : no dyspnea on exertion [Abdominal Pain] : no abdominal pain [Nausea] : no nausea [Constipation] : no constipation [Diarrhea] : diarrhea [Vomiting] : no vomiting [Heartburn] : no heartburn [Melena] : no melena [Dysuria] : no dysuria [Incontinence] : no incontinence [Hematuria] : no hematuria [Joint Pain] : no joint pain [Joint Stiffness] : no joint stiffness [Joint Swelling] : no joint swelling [Muscle Pain] : no muscle pain [Back Pain] : no back pain [Itching] : no itching [Mole Changes] : no mole changes [Skin Rash] : no skin rash [Headache] : no headache [Dizziness] : no dizziness [Fainting] : no fainting [Unsteady Walking] : no ataxia [Insomnia] : no insomnia [Anxiety] : no anxiety [Depression] : no depression [Easy Bleeding] : no easy bleeding [Easy Bruising] : no easy bruising [Swollen Glands] : no swollen glands [FreeTextEntry2] : 20  lbs weight loss over 8 months on chemotherapy, [FreeTextEntry3] : Wears corrective lenses, [FreeTextEntry8] : Nocturia of 1-2 X per night,

## 2025-01-22 NOTE — ASSESSMENT
[Patient Optimized for Surgery] : Patient optimized for surgery [No Further Testing Recommended] : no further testing recommended [Continue medications as is] : Continue current medications [As per surgery] : as per surgery [FreeTextEntry7] : Pt will not need to take any medication on the morning of surgery.

## 2025-01-22 NOTE — HISTORY OF PRESENT ILLNESS
[No Pertinent Cardiac History] : no history of aortic stenosis, atrial fibrillation, coronary artery disease, recent myocardial infarction, or implantable device/pacemaker [No Pertinent Pulmonary History] : no history of asthma, COPD, sleep apnea, or smoking [No Adverse Anesthesia Reaction] : no adverse anesthesia reaction in self or family member [(Patient denies any chest pain, claudication, dyspnea on exertion, orthopnea, palpitations or syncope)] : Patient denies any chest pain, claudication, dyspnea on exertion, orthopnea, palpitations or syncope [Good (7-10 METs)] : Good (7-10 METs) [Chronic Anticoagulation] : no chronic anticoagulation [Chronic Kidney Disease] : no chronic kidney disease [Diabetes] : no diabetes [FreeTextEntry1] : R hepatectomy with bile duct resection and lymphadenectomy [FreeTextEntry2] : 1/24/2025, Friday [FreeTextEntry3] : Dr. Eduardo Allison, [FreeTextEntry4] : Pt was diagnosed to cholangiocarcinoma in 5/2024.  She has liver mets and bile obstruction.  She had biliary stent placed and then had chemotherapy, she had 8 cycles of Dora/Cis/Durva as of 11/15/2024.  She then had biliary stent exchange on 12/3/2024.  Pt tolerated treatment well w/o too much side effects and no complications.  She lost about 20 lbs in that 8 months but weight has stabilized.  She is eating well and has normal bowel function.  She had imaging studies with CT scan in 11/2024 and MRI in 12/2024.  She was deemed a candidate a surgical candidate for resection of the residual tumor.  She is now scheduled to have R hepatectomy with bile duct resection and lymphadenectomy in 2 days.  She feels well w/o any complaint.  She denied recent illness.

## 2025-01-22 NOTE — PHYSICAL EXAM
[No Acute Distress] : no acute distress [Well Nourished] : well nourished [Well Developed] : well developed [Normal Sclera/Conjunctiva] : normal sclera/conjunctiva [PERRL] : pupils equal round and reactive to light [EOMI] : extraocular movements intact [Normal Outer Ear/Nose] : the outer ears and nose were normal in appearance [Normal Oropharynx] : the oropharynx was normal [Normal TMs] : both tympanic membranes were normal [Normal Nasal Mucosa] : the nasal mucosa was normal [No JVD] : no jugular venous distention [No Lymphadenopathy] : no lymphadenopathy [Supple] : supple [No Respiratory Distress] : no respiratory distress  [Clear to Auscultation] : lungs were clear to auscultation bilaterally [Normal Rate] : normal rate  [Regular Rhythm] : with a regular rhythm [Normal S1, S2] : normal S1 and S2 [No Carotid Bruits] : no carotid bruits [Pedal Pulses Present] : the pedal pulses are present [No Edema] : there was no peripheral edema [No Extremity Clubbing/Cyanosis] : no extremity clubbing/cyanosis [Soft] : abdomen soft [Non Tender] : non-tender [Non-distended] : non-distended [Normal Bowel Sounds] : normal bowel sounds [Normal Supraclavicular Nodes] : no supraclavicular lymphadenopathy [Normal Posterior Cervical Nodes] : no posterior cervical lymphadenopathy [Normal Anterior Cervical Nodes] : no anterior cervical lymphadenopathy [No CVA Tenderness] : no CVA  tenderness [No Spinal Tenderness] : no spinal tenderness [No Joint Swelling] : no joint swelling [Grossly Normal Strength/Tone] : grossly normal strength/tone [No Rash] : no rash [Coordination Grossly Intact] : coordination grossly intact [No Focal Deficits] : no focal deficits [Normal Gait] : normal gait [Speech Grossly Normal] : speech grossly normal [Normal Affect] : the affect was normal [Alert and Oriented x3] : oriented to person, place, and time [Normal Mood] : the mood was normal [de-identified] : female in stated age,

## 2025-01-22 NOTE — CONSULT LETTER
[Dear  ___] : Dear  [unfilled], [Consult Letter:] : I had the pleasure of evaluating your patient, [unfilled]. [Please see my note below.] : Please see my note below. [Consult Closing:] : Thank you very much for allowing me to participate in the care of this patient.  If you have any questions, please do not hesitate to contact me. [Sincerely,] : Sincerely, [FreeTextEntry1] : I saw the patient today for preoperative evaluation. [FreeTextEntry3] : Juju Correa MD

## 2025-01-22 NOTE — RESULTS/DATA
[] : results reviewed [de-identified] : WBC - 3.69, H/H - 11.7/24.6, Platelets - 114, normal differential, [de-identified] : PT/INR - 10.8/0.91(N), PTT - 38.1(H), [de-identified] : Glucose - 114, Cr - 1.19, GFR - 48, AST/ALT - 38/39, AP - 171, the rest of CMP were normal [de-identified] : HbA1c - 4.9, Blood type - B positive

## 2025-01-22 NOTE — RESULTS/DATA
[] : results reviewed [de-identified] : WBC - 3.69, H/H - 11.7/24.6, Platelets - 114, normal differential, [de-identified] : PT/INR - 10.8/0.91(N), PTT - 38.1(H), [de-identified] : Glucose - 114, Cr - 1.19, GFR - 48, AST/ALT - 38/39, AP - 171, the rest of CMP were normal [de-identified] : HbA1c - 4.9, Blood type - B positive

## 2025-01-22 NOTE — HISTORY OF PRESENT ILLNESS
[No Pertinent Cardiac History] : no history of aortic stenosis, atrial fibrillation, coronary artery disease, recent myocardial infarction, or implantable device/pacemaker [No Pertinent Pulmonary History] : no history of asthma, COPD, sleep apnea, or smoking [No Adverse Anesthesia Reaction] : no adverse anesthesia reaction in self or family member [(Patient denies any chest pain, claudication, dyspnea on exertion, orthopnea, palpitations or syncope)] : Patient denies any chest pain, claudication, dyspnea on exertion, orthopnea, palpitations or syncope [Good (7-10 METs)] : Good (7-10 METs) [Chronic Anticoagulation] : no chronic anticoagulation [Chronic Kidney Disease] : no chronic kidney disease [Diabetes] : no diabetes [FreeTextEntry1] : R hepatectomy with bile duct resection and lymphadenectomy [FreeTextEntry2] : 1/24/2025, Friday [FreeTextEntry3] : Dr. Eduardo Allison, [FreeTextEntry4] : Pt was diagnosed to cholangiocarcinoma in 5/2024.  She has liver mets and bile obstruction.  She had biliary stent placed and then had chemotherapy, she had 8 cycles of Hopkins/Cis/Durva as of 11/15/2024.  She then had biliary stent exchange on 12/3/2024.  Pt tolerated treatment well w/o too much side effects and no complications.  She lost about 20 lbs in that 8 months but weight has stabilized.  She is eating well and has normal bowel function.  She had imaging studies with CT scan in 11/2024 and MRI in 12/2024.  She was deemed a candidate a surgical candidate for resection of the residual tumor.  She is now scheduled to have R hepatectomy with bile duct resection and lymphadenectomy in 2 days.  She feels well w/o any complaint.  She denied recent illness.

## 2025-02-06 NOTE — CONSULT LETTER
[Dear  ___] : Dear  [unfilled], [Consult Letter:] : I had the pleasure of evaluating your patient, [unfilled]. [Please see my note below.] : Please see my note below. [Consult Closing:] : Thank you very much for allowing me to participate in the care of this patient.  If you have any questions, please do not hesitate to contact me. [Sincerely,] : Sincerely, [DrArmin  ___] : Dr. BAPTISTE [FreeTextEntry2] : Jania Lisa MD [FreeTextEntry3] : Eduardo Allison MD Surgical Oncology [facility] Office: [phone] Cell: [phone]

## 2025-02-06 NOTE — CONSULT LETTER
[Dear  ___] : Dear  [unfilled], [Consult Letter:] : I had the pleasure of evaluating your patient, [unfilled]. [Please see my note below.] : Please see my note below. [Consult Closing:] : Thank you very much for allowing me to participate in the care of this patient.  If you have any questions, please do not hesitate to contact me. [Sincerely,] : Sincerely, [DrArmin  ___] : Dr. BAPTISTE [FreeTextEntry2] : Jania Lisa MD [FreeTextEntry3] : Eduardo Allison MD Surgical Oncology [facility] Office: [phone] Cell: [phone]

## 2025-02-06 NOTE — ASSESSMENT
[FreeTextEntry1] :  We discussed manage pathology results.  She understands the need for ongoing systemic therapy and why primary resection was not an option.  Given her dark stools, she will undergo blood work and possible endoscopy if warranted.  All medical entries were at my, Dr. Eduardo Allison, direction. I have reviewed the chart and agree that the record accurately reflects my personal performance of the history, physical exam, assessment, and plan.  Our office nurse practitioner was present for the duration of the office visit.

## 2025-02-06 NOTE — PHYSICAL EXAM
[Normal] : supple, no neck mass and thyroid not enlarged [Normal Neck Lymph Nodes] : normal neck lymph nodes  [Normal Supraclavicular Lymph Nodes] : normal supraclavicular lymph nodes [Normal Groin Lymph Nodes] : normal groin lymph nodes [Normal Axillary Lymph Nodes] : normal axillary lymph nodes [Normal] : oriented to person, place and time, with appropriate affect [de-identified] : midline incision healing well

## 2025-02-06 NOTE — REASON FOR VISIT
[Follow-Up Visit] : a follow-up visit for [Post-Op] : a post-op for [FreeTextEntry2] : cholangiocarcinoma

## 2025-02-06 NOTE — PHYSICAL EXAM
[Normal] : supple, no neck mass and thyroid not enlarged [Normal Neck Lymph Nodes] : normal neck lymph nodes  [Normal Supraclavicular Lymph Nodes] : normal supraclavicular lymph nodes [Normal Groin Lymph Nodes] : normal groin lymph nodes [Normal Axillary Lymph Nodes] : normal axillary lymph nodes [Normal] : oriented to person, place and time, with appropriate affect [de-identified] : midline incision healing well

## 2025-02-06 NOTE — HISTORY OF PRESENT ILLNESS
[de-identified] : Ms. SUSANNE ENRIQUEZ is a 73-year-old woman, w/ cholangiocarcinoma now s/p 8 cycles of NACT (Caribou/Cis), here for a post-op visit.   In May 2024, Susanne presented to [facility] after outpatient labs @ urgent care showed elevated LFTs and Tbili. At that time, she also noted progressively darkening urine and pale stools for one week along with postprandial epigastric pain.   abd U/S 5/10/2024 - moderate intra & extra hepatic biliary dilatation  - abnormal appearing gallbladder (wall thickened and irregular in contour and is not clearly defined in some areas, inseparable from the liver) - abnormal soft tissue density within the distal portion of the CBD and at the ze hepatic, the constellation of findings is most concerning for possible gallbladder neoplasm w/ extension to the ze hepatis & associated biliary obstruction -> further eval w/ CT or MRI recommended   CT C/A/P 2024 - soft tissue is visualized within the ze hepatis which appears to markedly narrow the common hepatic duct, mild intrahepatic biliary ductal dilatation, CBD normal - soft tissues once again visualized within the gallbladder fundus w/ involvement of the gallbladder fossa, contact w/ the hepatic flexure & involvement of the ze hepatis -> differential dx remains cholangio & gallbladder cancer - common, proper & left hepatic artery are patent, soft tissue mass within the ze hepatis involves and attenuates the mid right hepatic artery. The distal branches of the right hepatic artery are well opacified. Remainder of the visualized arterial system is patent - no evidence of mets in chest   MR/MRCP 2024 - 1.8 x 2.2 cm mass at the hepatic duct bifurcation resulting in intrahepatic biliary ductal dilatation -> highly suspicious for cholangiocarcinoma (Kl[place] tumor), EUS recommended  - a 2.5 x 3.3 cm area of septated masslike thickening of the gallbladder fundus w/ cystic changes -> could represent either masslike adenomyomatosis vs a separate gallbladder mass   labs inpatient: CEA 1.5, CA 19.9: 257, Tbili 5.7 on arrival to hospital, increased to 9.9 pre-stent and on discharge was 3.6   EUS/ERCP 2024 (Dr. Dangelo) - proximal biliary mass without involvement of the portal vein, large ze hepatic LAD & gallbladder lesion noted - ERCP notable for hilar stricture s/p sphincterotomy, dilation of the stricture to 6 mm, brushing, biopsy, cholangioscopy & PLASTIC stent placed. *repeat in 3 months for stent removal/exchange if no sx done  - cholangioscopy notable for abnormal mucosa of the proximal bile duct w/ narrowing and nodularity w/ decreased vascular pattern s/p spy bite bx ** biopsy of CBD structure c/w carcinoma, favor adeno  PMH: HLD, osteopenia, pre-DM, low calcium (intermittently) PSH:  denies  Meds:   ALL: NKA  SH:  denies tobacco or ETOH use, lives at home with her  & daughter, retired  FH: father w/ brain cancer  GYN: Menarche 14. Menopause 50. . 5 years of OCP use in the past  Last colonoscopy  and WNL  ECOG 0   2024 - Susanne is here with her  for an initial visit and to discuss recent biopsy results. She reports that her stool & urine have since returned to normal color after the stent placement. She otherwise denies any GI symptoms, appetite & weight are well maintained. She denies abd pain, nausea, vomiting or diarrhea.   2024-PET/CT scan-. FDG-avid soft tissue within the ze hepatis, surrounding common bile duct stent, extending into gallbladder/gallbladder fossa, corresponds to known malignancy. FDG-avid soft tissue nodule in right parotid gland is nonspecific. Differential diagnosis includes benign and malignant salivary gland neoplasms and an intraparotid lymph node. Further evaluation may be performed with ultrasound and percutaneous needle biopsy. Indeterminate FDG-avid focus in fundus of uterus. Pelvic ultrasound/pelvic MRI may be obtained for further evaluation.  2024 CT A/P- Interval placement of common bile duct stent with minimal decrease in mild intrahepatic biliary dilatation. Infiltrative soft tissue along the gallbladder wall and common hepatic duct consistent with tumor, not significantly changed.  Patient hospitalized from  to , s/p biliary stent placement 2024 found to have worsening elevation of LFTs, bilirubin and worsening fatigue. Patient found to have cholangitis and underwent stent exchange on 2024. Hospital course c/b worsening hyperbilirubinemia and transaminitis requiring  repeat ERCP w/ repeat biliary stent exchange on 24. Labs improved s/p repeat ERCP.  Patient had oncologic work up while inpatient. US of R parotid w/ benign findings.  US pelvis showed fibroid uterus w/ benign features, but given FDG-avid lesion, will need biopsy as outpatient.  Labs 2024: T-bili 3.5 | AST 94 |  |   2024- Patient established with Dr. Gracia with plans for neoadjuvant chemotherapy.  Plan for Gemcitabine along with Cisplatin, and pending rheumatology evaluation, tentatively plan to add durvalumab with chemotherapy.  She has now completed 2 cycles of chemotherapy.  Cycle 3 scheduled 2024.  She reports overall fatigue and diminished appetite but she is drinking Boost supplements for nutrition.  She was experiencing diarrhea initially but this resolved.  Her urine and stool color are now normal, and skin/sclera icterus is improving.    2024-CT abdomen/pelvis- Adequate positioning of the biliary stent, however there is no pneumobilia to suggest patency. Mild intrahepatic biliary ductal dilatation. New from 2024 are ill-defined hypodensities scattered throughout the bilateral hepatic lobes, suggestive of metastatic disease.  7/3/24-MRI Abdomen- Focal stricture of the common hepatic duct/common biliary duct measuring 1.6 cm with progressive enhancement, consistent with cholangiocarcinoma. CBD stent traverse through the focal stricture. Mild intra and extrahepatic biliary ductal dilatation. Complex solid and cystic lesion in the gallbladder fossa with areas of thickened septations/nodular enhancement. Multiple subcentimeter T2 hyperintense foci throughout the hepatic parenchyma and few of which in the right hepatic lobe demonstrating peripheral rim enhancement. Although nonspecific, these are suspicious for microabscesses.  2024 ERCP (Dr. Dangelo)- malignant hilar stricture, s/p stent and stone removal and insertion of right and left hepatic duct plastic stents.  Advised to return for stent exchange in 1-2 months.   2024-MRI Abdomen-Resolution of right hepatic lesions, in keeping with infection. Bile duct mass, with associated common hepatic duct narrowing, without change. Slightly increased CBD dilatation with distal tapering. Unchanged intrahepatic biliary duct dilatation. Fundal gallbladder mass, slightly decreased in size.  2024 Right parotid FNA- benign salivary gland tissue only.  2024- Susanne continues systemic therapy with Dr. Gracia, s/p C5 (gem/cis/Durvalumab).  Tolerating treatment well overall.  Has altered taste causing aversion to many foods but feels she is getting reasonable nutrition and maintaining her weight.  Currently denies any abdominal pain, fever, chills, acholic stools or dark urine. Susanne continues with neoadjuvant Caribou/Cis/Durva. I am arranging a liver protocol CT of the abdomen pelvis to assist in reassessment. She will ultimately require a repeat PET/CT prior to considering surgical exploration. Ideally, she will get a few more cycles with ongoing tumor response.  liver protocol CT A/P 2024 - fundal GB mass measures 1.7 x 1.4 cm (previously 1.8 x 1.8 cm on MRI) - CBD dilatation, unchanged to 1 cm, CBD mass is difficult to visualize on CT   MR/MRCP (w/ EOVIST) 2024 - unchanged mild stricture with residual periductal ill-defined tissue at ductal confluence w/ unchanged mild intrahepatic ductal dilatation - probable unchanged residual GB tumor - unchanged cystic lesions in pancreas head, up to 4 mm, no main duct dilatation or mural nodule   2024 - Susanne is here for ongoing follow-up, she has completed 8 cycles of Caribou/Cis/Durva as of 2024. She underwent a stent exchange on 12/3/24 w/ Dr. Villegas.  We discussed the role of a right hepatectomy with bile duct resection and lymphadenectomy.  We discussed the risks, benefits and alternatives of the procedure.  We also discussed post operative expectations and possible complications.  Susanne and her  express understanding and agree to proceed.  **SURGERY** Susanne was scheduled for an exp lap hepatectomy & bile duct resection on 2025 - however intra-operatively was noted to have positive peritoneal implants, case was aborted and biopsies taken, path: - peritoneal bx: moderately differentiated adeno - right diaphragmatic nodule: moderate to poorly differentiated adeno - peritoneal bx #2: moderately differentiated adeno  2025 - Susanne is here for a post-op visit and to discuss recent pathology. She is recovering well from surgery, does note some black stools for the last 2 days but denies any other worrisome symptoms. Her appetite is improving, and she is ambulating well with no issues.

## 2025-02-06 NOTE — HISTORY OF PRESENT ILLNESS
[de-identified] : Ms. SUSANNE ENRIQUEZ is a 73-year-old woman, w/ cholangiocarcinoma now s/p 8 cycles of NACT (Hoke/Cis), here for a post-op visit.   In May 2024, Susanne presented to [facility] after outpatient labs @ urgent care showed elevated LFTs and Tbili. At that time, she also noted progressively darkening urine and pale stools for one week along with postprandial epigastric pain.   abd U/S 5/10/2024 - moderate intra & extra hepatic biliary dilatation  - abnormal appearing gallbladder (wall thickened and irregular in contour and is not clearly defined in some areas, inseparable from the liver) - abnormal soft tissue density within the distal portion of the CBD and at the ze hepatic, the constellation of findings is most concerning for possible gallbladder neoplasm w/ extension to the ze hepatis & associated biliary obstruction -> further eval w/ CT or MRI recommended   CT C/A/P 2024 - soft tissue is visualized within the ze hepatis which appears to markedly narrow the common hepatic duct, mild intrahepatic biliary ductal dilatation, CBD normal - soft tissues once again visualized within the gallbladder fundus w/ involvement of the gallbladder fossa, contact w/ the hepatic flexure & involvement of the ze hepatis -> differential dx remains cholangio & gallbladder cancer - common, proper & left hepatic artery are patent, soft tissue mass within the ze hepatis involves and attenuates the mid right hepatic artery. The distal branches of the right hepatic artery are well opacified. Remainder of the visualized arterial system is patent - no evidence of mets in chest   MR/MRCP 2024 - 1.8 x 2.2 cm mass at the hepatic duct bifurcation resulting in intrahepatic biliary ductal dilatation -> highly suspicious for cholangiocarcinoma ([place] tumor), EUS recommended  - a 2.5 x 3.3 cm area of septated masslike thickening of the gallbladder fundus w/ cystic changes -> could represent either masslike adenomyomatosis vs a separate gallbladder mass   labs inpatient: CEA 1.5, CA 19.9: 257, Tbili 5.7 on arrival to hospital, increased to 9.9 pre-stent and on discharge was 3.6   EUS/ERCP 2024 (Dr. Dangelo) - proximal biliary mass without involvement of the portal vein, large ze hepatic LAD & gallbladder lesion noted - ERCP notable for hilar stricture s/p sphincterotomy, dilation of the stricture to 6 mm, brushing, biopsy, cholangioscopy & PLASTIC stent placed. *repeat in 3 months for stent removal/exchange if no sx done  - cholangioscopy notable for abnormal mucosa of the proximal bile duct w/ narrowing and nodularity w/ decreased vascular pattern s/p spy bite bx ** biopsy of CBD structure c/w carcinoma, favor adeno  PMH: HLD, osteopenia, pre-DM, low calcium (intermittently) PSH:  denies  Meds:   ALL: NKA  SH:  denies tobacco or ETOH use, lives at home with her  & daughter, retired  FH: father w/ brain cancer  GYN: Menarche 14. Menopause 50. . 5 years of OCP use in the past  Last colonoscopy  and WNL  ECOG 0   2024 - Susanne is here with her  for an initial visit and to discuss recent biopsy results. She reports that her stool & urine have since returned to normal color after the stent placement. She otherwise denies any GI symptoms, appetite & weight are well maintained. She denies abd pain, nausea, vomiting or diarrhea.   2024-PET/CT scan-. FDG-avid soft tissue within the ze hepatis, surrounding common bile duct stent, extending into gallbladder/gallbladder fossa, corresponds to known malignancy. FDG-avid soft tissue nodule in right parotid gland is nonspecific. Differential diagnosis includes benign and malignant salivary gland neoplasms and an intraparotid lymph node. Further evaluation may be performed with ultrasound and percutaneous needle biopsy. Indeterminate FDG-avid focus in fundus of uterus. Pelvic ultrasound/pelvic MRI may be obtained for further evaluation.  2024 CT A/P- Interval placement of common bile duct stent with minimal decrease in mild intrahepatic biliary dilatation. Infiltrative soft tissue along the gallbladder wall and common hepatic duct consistent with tumor, not significantly changed.  Patient hospitalized from  to , s/p biliary stent placement 2024 found to have worsening elevation of LFTs, bilirubin and worsening fatigue. Patient found to have cholangitis and underwent stent exchange on 2024. Hospital course c/b worsening hyperbilirubinemia and transaminitis requiring  repeat ERCP w/ repeat biliary stent exchange on 24. Labs improved s/p repeat ERCP.  Patient had oncologic work up while inpatient. US of R parotid w/ benign findings.  US pelvis showed fibroid uterus w/ benign features, but given FDG-avid lesion, will need biopsy as outpatient.  Labs 2024: T-bili 3.5 | AST 94 |  |   2024- Patient established with Dr. Gracia with plans for neoadjuvant chemotherapy.  Plan for Gemcitabine along with Cisplatin, and pending rheumatology evaluation, tentatively plan to add durvalumab with chemotherapy.  She has now completed 2 cycles of chemotherapy.  Cycle 3 scheduled 2024.  She reports overall fatigue and diminished appetite but she is drinking Boost supplements for nutrition.  She was experiencing diarrhea initially but this resolved.  Her urine and stool color are now normal, and skin/sclera icterus is improving.    2024-CT abdomen/pelvis- Adequate positioning of the biliary stent, however there is no pneumobilia to suggest patency. Mild intrahepatic biliary ductal dilatation. New from 2024 are ill-defined hypodensities scattered throughout the bilateral hepatic lobes, suggestive of metastatic disease.  7/3/24-MRI Abdomen- Focal stricture of the common hepatic duct/common biliary duct measuring 1.6 cm with progressive enhancement, consistent with cholangiocarcinoma. CBD stent traverse through the focal stricture. Mild intra and extrahepatic biliary ductal dilatation. Complex solid and cystic lesion in the gallbladder fossa with areas of thickened septations/nodular enhancement. Multiple subcentimeter T2 hyperintense foci throughout the hepatic parenchyma and few of which in the right hepatic lobe demonstrating peripheral rim enhancement. Although nonspecific, these are suspicious for microabscesses.  2024 ERCP (Dr. Dangelo)- malignant hilar stricture, s/p stent and stone removal and insertion of right and left hepatic duct plastic stents.  Advised to return for stent exchange in 1-2 months.   2024-MRI Abdomen-Resolution of right hepatic lesions, in keeping with infection. Bile duct mass, with associated common hepatic duct narrowing, without change. Slightly increased CBD dilatation with distal tapering. Unchanged intrahepatic biliary duct dilatation. Fundal gallbladder mass, slightly decreased in size.  2024 Right parotid FNA- benign salivary gland tissue only.  2024- Susanne continues systemic therapy with Dr. Gracia, s/p C5 (gem/cis/Durvalumab).  Tolerating treatment well overall.  Has altered taste causing aversion to many foods but feels she is getting reasonable nutrition and maintaining her weight.  Currently denies any abdominal pain, fever, chills, acholic stools or dark urine. Susanne continues with neoadjuvant Hoke/Cis/Durva. I am arranging a liver protocol CT of the abdomen pelvis to assist in reassessment. She will ultimately require a repeat PET/CT prior to considering surgical exploration. Ideally, she will get a few more cycles with ongoing tumor response.  liver protocol CT A/P 2024 - fundal GB mass measures 1.7 x 1.4 cm (previously 1.8 x 1.8 cm on MRI) - CBD dilatation, unchanged to 1 cm, CBD mass is difficult to visualize on CT   MR/MRCP (w/ EOVIST) 2024 - unchanged mild stricture with residual periductal ill-defined tissue at ductal confluence w/ unchanged mild intrahepatic ductal dilatation - probable unchanged residual GB tumor - unchanged cystic lesions in pancreas head, up to 4 mm, no main duct dilatation or mural nodule   2024 - Susanne is here for ongoing follow-up, she has completed 8 cycles of Hoke/Cis/Durva as of 2024. She underwent a stent exchange on 12/3/24 w/ Dr. Villegas.  We discussed the role of a right hepatectomy with bile duct resection and lymphadenectomy.  We discussed the risks, benefits and alternatives of the procedure.  We also discussed post operative expectations and possible complications.  Susanne and her  express understanding and agree to proceed.  **SURGERY** Susanne was scheduled for an exp lap hepatectomy & bile duct resection on 2025 - however intra-operatively was noted to have positive peritoneal implants, case was aborted and biopsies taken, path: - peritoneal bx: moderately differentiated adeno - right diaphragmatic nodule: moderate to poorly differentiated adeno - peritoneal bx #2: moderately differentiated adeno  2025 - Susanne is here for a post-op visit and to discuss recent pathology. She is recovering well from surgery, does note some black stools for the last 2 days but denies any other worrisome symptoms. Her appetite is improving, and she is ambulating well with no issues.

## 2025-02-07 NOTE — PHYSICAL EXAM
[No Acute Distress] : no acute distress [Well Nourished] : well nourished [Well Developed] : well developed [Supple] : supple [No Respiratory Distress] : no respiratory distress  [Clear to Auscultation] : lungs were clear to auscultation bilaterally [Normal Rate] : normal rate  [Regular Rhythm] : with a regular rhythm [Normal S1, S2] : normal S1 and S2 [No Edema] : there was no peripheral edema [Soft] : abdomen soft [Non Tender] : non-tender [Normal Bowel Sounds] : normal bowel sounds [No CVA Tenderness] : no CVA  tenderness [No Spinal Tenderness] : no spinal tenderness [No Joint Swelling] : no joint swelling [Grossly Normal Strength/Tone] : grossly normal strength/tone [Normal Gait] : normal gait [Speech Grossly Normal] : speech grossly normal [Alert and Oriented x3] : oriented to person, place, and time [de-identified] : female in stated age, [de-identified] : Well healed midline surgical scar w/o sign of infection,

## 2025-02-07 NOTE — ASSESSMENT
[FreeTextEntry1] : Pt will restart chemotherapy soon, I advised getting some HCM tests done.  She already had mammogram and DEXA scan.  She is UTD with colonoscopy.  She was advised to have eye exam and dental care.  Pt also should RTO for PE when it does not interfere with chemotherapy.

## 2025-02-07 NOTE — PHYSICAL EXAM
[No Acute Distress] : no acute distress [Well Nourished] : well nourished [Well Developed] : well developed [Supple] : supple [No Respiratory Distress] : no respiratory distress  [Clear to Auscultation] : lungs were clear to auscultation bilaterally [Normal Rate] : normal rate  [Regular Rhythm] : with a regular rhythm [Normal S1, S2] : normal S1 and S2 [No Edema] : there was no peripheral edema [Soft] : abdomen soft [Non Tender] : non-tender [Normal Bowel Sounds] : normal bowel sounds [No CVA Tenderness] : no CVA  tenderness [No Spinal Tenderness] : no spinal tenderness [No Joint Swelling] : no joint swelling [Grossly Normal Strength/Tone] : grossly normal strength/tone [Normal Gait] : normal gait [Speech Grossly Normal] : speech grossly normal [Alert and Oriented x3] : oriented to person, place, and time [de-identified] : female in stated age, [de-identified] : Well healed midline surgical scar w/o sign of infection,

## 2025-02-07 NOTE — HISTORY OF PRESENT ILLNESS
[Spouse] : spouse [FreeTextEntry1] : Pt presented for f/u after recent surgery for cholangiocarcinaom. [de-identified] : Pt has cholangiocarcinoma, she also has liver mets and bile duct obstruction.  She had biliary stent placed.  She then had chemotherapy and tolerated it well.  She had stent removed.  Plan was for her to have R hepatectomy with bile duct resection and lymphadenectomy in 1/2025.  However, she was found to have extensive carcinomatosis, biopsy was done and surgery was abort.  Pt recovered well from surgery.  She f/u'ed with surgeon yesterday and was told that she would benefit from resuming chemotherapy.  Pt has appt with oncologist in 2 weeks.  She is doing well postop, and did not have any complications.  She is now eating better and denied any N/V/D.  Her energy level was very good.  Pt has not have Flu vaccine yet and would like to do son.

## 2025-02-07 NOTE — HISTORY OF PRESENT ILLNESS
[Spouse] : spouse [FreeTextEntry1] : Pt presented for f/u after recent surgery for cholangiocarcinaom. [de-identified] : Pt has cholangiocarcinoma, she also has liver mets and bile duct obstruction.  She had biliary stent placed.  She then had chemotherapy and tolerated it well.  She had stent removed.  Plan was for her to have R hepatectomy with bile duct resection and lymphadenectomy in 1/2025.  However, she was found to have extensive carcinomatosis, biopsy was done and surgery was abort.  Pt recovered well from surgery.  She f/u'ed with surgeon yesterday and was told that she would benefit from resuming chemotherapy.  Pt has appt with oncologist in 2 weeks.  She is doing well postop, and did not have any complications.  She is now eating better and denied any N/V/D.  Her energy level was very good.  Pt has not have Flu vaccine yet and would like to do son.

## 2025-02-27 NOTE — ASSESSMENT
[FreeTextEntry1] : Labs reviewed.  #1) GB carcinoma-T4N1(Stage IIIC) clinically. 5/28/2024-PET/CT scan-. FDG-avid soft tissue within the ze hepatis, surrounding common bile duct stent, extending into gallbladder/gallbladder fossa, corresponds to known malignancy. FDG-avid soft tissue nodule in right parotid gland is nonspecific. Differential diagnosis includes benign and malignant salivary gland neoplasms and an intraparotid lymph node. Further evaluation may be performed with ultrasound and percutaneous needle biopsy. Indeterminate FDG-avid focus in fundus of uterus. Pelvic ultrasound/pelvic MRI may be obtained for further evaluation. -**Requested Foundation One, PD-L1 on pathology-insufficient tissue. 7/15/2024-FoundationOne Liquid biopsy-ZHANE, TMB=1 Muts/Mb. ARID1A and TET2 mutations.  No  diagnostic alterations for FoundationOne liquid CDX were detected. --had reviewed roles of surgery/radiation/systemic therapy in biliary tract cancers-requires a multidisciplinary approach.  5/30/2024-Had discussed case with surgeon Dr. Allison.  He recommended neoadjuvant systemic therapy with interval follow-up imaging at 2 months, and pending this, again at 4 months for surgical decisions.  6/10/2024-Started neoadjuvant Gemcitabine/Cisplatin/Durvalumab.  Course complicated by biliary stent issues, Klebsiella bacteremia. Following treatment of infection, resumed treatment.  With creatinine improved, resumed cisplatin.  Needed to further elucidate liver lesions: 7/2/2024-CT abdomen/pelvis-IMPRESSION: Adequate positioning of the biliary stent, however there is no  pneumobilia to suggest patency. Mild intrahepatic biliary ductal  dilatation. New from 6/4/2024 are ill-defined hypodensities scattered throughout the  bilateral hepatic lobes, suggestive of metastatic disease.  7/3/24-MRI Abdomen-IMPRESSION: 1.  Focal stricture of the common hepatic duct/common biliary duct  measuring 1.6 cm with progressive enhancement, consistent with  cholangiocarcinoma. CBD stent traverse through the focal stricture. Mild  intra and extrahepatic biliary ductal dilatation. 2.  Complex solid and cystic lesion in the gallbladder fossa with areas  of thickened septations/nodular enhancement. 3.  Multiple subcentimeter T2 hyperintense foci throughout the hepatic  parenchyma and few of which in the right hepatic lobe demonstrating  peripheral rim enhancement. Although nonspecific, these are suspicious  for microabscesses.  --?Micro abscesses vs. mets 7/18/2024-MRI Abdomen-1.  Several small right hepatic lobe liver lesions are without significant change from 7/3/2024. While favored to be inflammatory from prior cholangitis (improvement on imaging can lag behind clinical improvement), metastatic disease remains possible. Follow-up MRI abdomen recommended in 6-8 weeks. 2.  Bile duct mass and gallbladder mass are without significant change. 3.  Iron deposition within the liver. 9/4/2024-MRI Abdomen-Resolution of right hepatic lesions, in keeping with infection. Bile duct mass, with associated common hepatic duct narrowing, without change. Slightly increased CBD dilatation with distal tapering. Unchanged intrahepatic biliary duct dilatation. Fundal gallbladder mass, slightly decreased in size. 11/6/2024-CT A/P-Fundal gallbladder mass is slightly decreased in size since 9/4/2024. CBD dilatation, unchanged. Known CBD mass is difficult to visualize on CT.  11/25/2024-Completed 8th cycle Gemcitabine/Cisplatin/Durvalumab.  12/18/2024-MRI abdomen/MRCP-. Since September 4, 2024, unchanged residual stricture at biliary ductal confluence and unchanged residual gallbladder tumor. No new suspicious finding. 1/2/2025-PET/CT scan-Compared to FDG PET/CT dated 5/28/2024: 1. FDG-avid lesion in ze hepatis adjacent to biliary stent and FDG-avid focus within the gallbladder, not well delineated on CT, are decreased in size and mildly decreased in metabolism, compatible with a partial response to interval therapy. 2. Resolution of small FDG-avid focus in right parotid region. 3. Resolution of FDG-avid focus in fundus of uterus. 4. No new lesion.  **1/24/2025-Patient was scheduled for an exp lap hepatectomy & bile duct resection - however intra-operatively was noted to have positive peritoneal implants, case was aborted and biopsies taken, path: - peritoneal bx: moderately differentiated adenocarcinoma - right diaphragmatic nodule: moderate to poorly differentiated adenocarcinoma - peritoneal bx #2: moderately differentiated adenocarcinoma PD-L1 TPS 75%, Her 2- (1+), pMMR; Foundation testing pending per path report.  2/26/2025-CT C/A/P-Heterogeneity of the gallbladder fundus again noted. Multiple peritoneal implants consistent with metastatic disease. As patient now with measurable progression of disease on imaging, discussed with her treatment alternatives with respective pros/cons. Recommend change in systemic therapy-to consider FOLFOX regimen. Plan to proceed with FOLFOX. Discussed side effects of chemotherapy including but not limited to nausea/vomiting, diarrhea, loss of appetite, hair loss, electrolyte derangements, allergic reaction, fatigue, forgetfulness, heart effects, infusion site reactions, kidney/bladder effects, light sensitivity, liver damage, low blood cell count, lung effects, muscle or bone effects/pain, numbness/tingling, risk of bleeding, infection, sexual side effects, skin effects, and sores in mouth and throat. Rarely, there is risk of developing secondary malignancies. Patient gave written consent-obtained today, 2/27/2025  Note, received message from GI/hepatobiliary research team at [facility] 2/26/2025-they do not have any trial options for this patient at this time.  #2) FDG-avid soft tissue nodule in right parotid gland nonspecific on PET/CT scan.  F/U right parotid US 7/30/2024-The right parotid gland is normal in size and echogenicity. Again noted is 8 x 7 x 5 mm heterogeneous predominantly isoechoic nodularity in the superficial midportion of the parotid gland, unchanged. Findings are nonspecific. Consider follow up ultrasound in 6 months. 9/2024-Saw ENT MD Dr. Cid. 9/30/2024-SALIVARY GLAND, PAROTID, RIGHT INFERIOR, US GUIDED FNA NON DIAGNOSTIC. Benign salivary gland tissue only Resolution of small FDG-avid focus in right parotid region on PET/CT scan 12/24/2024. ---continue f/u with ENT MD   #3) indeterminate FDG-avid focus in fundus of uterus on PET/CT scan Pelvic US 7/30/2024-Leiomyomatous uterus. Subcentimeter bilateral simple ovarian cyst. Trace fundal endometrial fluid. Resolution of FDG-avid focus in fundus of uterus on 12/24/2024 PET/CT scan. --has seen GYN MD 1/2024-yearly f/u as planned  Patient was given the opportunity to ask questions. Her questions have been answered to her apparent satisfaction at this time. She expressed her understanding and willingness to comply with recommended f/u.  -->Plan to start: FOLFOX: 5-Fluorouracil 400 mg/m2 IV bolus, then 2400 mg/m2 CIV/48 hours;  mg/m2 IV and Oxaliplatin 85 mg/m2 IV. Cycle every 14 days. Neulasta Onpro support. Cycle #1 to begin on 3/3/2025. RTO on 3/3/2025, f/u appointment with Dr. Gracia.

## 2025-02-27 NOTE — HISTORY OF PRESENT ILLNESS
[Disease: _____________________] : Disease: [unfilled] [de-identified] : 5/2024-Patient presented to [facility] with elevated LFTs. At that time, she also noted progressively darkening urine and pale stools for one week along with postprandial epigastric pain. 5/10/2024-Abdominal ultrasound-moderate intrahepatic and extrahepatic biliary dilatation.  Abnormal appearing gallbladder.  Abnormal soft tissue density within the distal portion of the common bile duct and at the ze hepatitis.  The constellation of findings is most concerning for possible gallbladder neoplasm with extension to the ze habitus and associated biliary obstruction.  5/11/2024-CT abdomen/pelvis-gallbladder mass and soft tissue within the ze hepatis with enlarged portal caval node.  Soft tissue involvement of the biliary ducts and vasculature.  No evidence of metastatic disease within the chest. 5/11/2024 MR/MRCP-there is an approximately 2.2 cm obstructing mass centered at the bifurcation of the common hepatic duct with intrahepatic biliary duct dilatation, highly suspicious for cholangiocarcinoma (Klatskin tumor).  Masslike appearance of the gallbladder fundus with cystic changes measuring approximately 3.3 cm, which could represent either masslike adenoma I will mitosis versus a separate gallbladder mass.  No evidence of metastatic disease within the abdomen.  5/14/2024-EUS/ERCP 5/14/2024 (Dr. Dangelo) - proximal biliary mass without involvement of the portal vein, large ze hepatic LAD & gallbladder lesion noted - ERCP notable for hilar stricture s/p sphincterotomy, dilation of the stricture to 6 mm, brushing, biopsy, cholangioscopy & PLASTIC stent placed. *repeat in 3 months for stent removal/exchange if no sx done - cholangioscopy notable for abnormal mucosa of the proximal bile duct w/ narrowing and nodularity w/ decreased vascular pattern s/p spy bite bx ** biopsy of CBD structure c/w carcinoma, favor adeno Common bile duct stricture biopsy-small clusters of atypical cells, detached were within stroma, compatible with carcinoma and favor adenocarcinoma. Bile duct mass biopsy-minute fragments of fibrous tissue with marked crush artifact and rare, atypical cells singly or in small clusters.  5/28/2024-PET/CT scan- FDG-avid soft tissue within the ze hepatis, surrounding common bile duct stent, extending into gallbladder/gallbladder fossa, corresponds to known malignancy. FDG-avid soft tissue nodule in right parotid gland is nonspecific. Differential diagnosis includes benign and malignant salivary gland neoplasms and an intraparotid lymph node. Further evaluation may be performed with ultrasound and percutaneous needle biopsy. Indeterminate FDG-avid focus in fundus of uterus. Pelvic ultrasound/pelvic MRI may be obtained for further evaluation.  6/10/2024-Started neoadjuvant Gemcitabine/Cisplatin/Durvalumab.  Course complicated by biliary stent issues, Klebsiella bacteremia (7/1/2024).  7/2/2024-CT abdomen/pelvis-IMPRESSION: Adequate positioning of the biliary stent, however there is no  pneumobilia to suggest patency. Mild intrahepatic biliary ductal  dilatation. New from 6/4/2024 are ill-defined hypodensities scattered throughout the  bilateral hepatic lobes, suggestive of metastatic disease.  7/3/24-MRI Abdomen-IMPRESSION: 1.  Focal stricture of the common hepatic duct/common biliary duct  measuring 1.6 cm with progressive enhancement, consistent with  cholangiocarcinoma. CBD stent traverse through the focal stricture. Mild  intra and extrahepatic biliary ductal dilatation. 2.  Complex solid and cystic lesion in the gallbladder fossa with areas  of thickened septations/nodular enhancement. 3.  Multiple subcentimeter T2 hyperintense foci throughout the hepatic  parenchyma and few of which in the right hepatic lobe demonstrating  peripheral rim enhancement. Although nonspecific, these are suspicious  for microabscesses.  9/4/2024-MRI Abdomen-Resolution of right hepatic lesions, in keeping with infection. Bile duct mass, with associated common hepatic duct narrowing, without change. Slightly increased CBD dilatation with distal tapering. Unchanged intrahepatic biliary duct dilatation. Fundal gallbladder mass, slightly decreased in size. 11/6/2024-CT A/P-Fundal gallbladder mass is slightly decreased in size since 9/4/2024. CBD dilatation, unchanged. Known CBD mass is difficult to visualize on CT.  11/25/2024-Completed 8th cycle Gemcitabine/Cisplatin/Durvalumab.  12/18/2024-MRI abdomen/MRCP-. Since September 4, 2024, unchanged residual stricture at biliary ductal confluence and unchanged residual gallbladder tumor. No new suspicious finding.  1/2/2025-PET/CT scan-Compared to FDG PET/CT dated 5/28/2024: 1. FDG-avid lesion in ze hepatis adjacent to biliary stent and FDG-avid focus within the gallbladder, not well delineated on CT, are decreased in size and mildly decreased in metabolism, compatible with a partial response to interval therapy. 2. Resolution of small FDG-avid focus in right parotid region. 3. Resolution of FDG-avid focus in fundus of uterus. 4. No new lesion.  1/24/2025-Patient was scheduled for an exp lap hepatectomy & bile duct resection - however intra-operatively was noted to have positive peritoneal implants, case was aborted and biopsies taken, path: - peritoneal bx: moderately differentiated adeno - right diaphragmatic nodule: moderate to poorly differentiated adeno - peritoneal bx #2: moderately differentiated adeno PD-L1 TPS 75%, Her 2- (1+), pMMR  2/26/2025-CT C/A/P-Heterogeneity of the gallbladder fundus again noted. Multiple peritoneal implants consistent with metastatic disease.  [de-identified] : Adenocarcinoma [de-identified] : 5/21/2024-CA 19-9=290 [de-identified] :  --Found to be inoperable at time of surgical exploration. No c/o N/V/D, fevers. No SOB. No melena, bleeding reported. Has next biliary stent replacement 4/17/25.   [de-identified] : 7/15/2024-[facility] Liquid biopsy-ZHANE, TMB=1 Muts/Mb. ARID1A and TET2 mutations.  No  diagnostic alterations for [facility] liquid CDX were detected.

## 2025-02-27 NOTE — PHYSICAL EXAM
[Normal] : affect appropriate [de-identified] : surgical incision well healed-intact. Mild discomfort at right lower abdomen [de-identified] : no pain expressed on palpation

## 2025-02-27 NOTE — PHYSICAL EXAM
[Normal] : affect appropriate [Fully active, able to carry on all pre-disease performance without restriction] : Status 0 - Fully active, able to carry on all pre-disease performance without restriction [de-identified] : no mass appreciated. [de-identified] : no pain expressed on palpation

## 2025-02-27 NOTE — REASON FOR VISIT
[Initial Consultation] : an initial consultation [Spouse] : spouse [Follow-Up Visit] : a follow-up [FreeTextEntry2] : GB/cholangiocarcinoma

## 2025-02-27 NOTE — HISTORY OF PRESENT ILLNESS
[Disease: _____________________] : Disease: [unfilled] [de-identified] : 5/2024-Patient presented to [facility] with elevated LFTs. At that time, she also noted progressively darkening urine and pale stools for one week along with postprandial epigastric pain. 5/10/2024-Abdominal ultrasound-moderate intrahepatic and extrahepatic biliary dilatation.  Abnormal appearing gallbladder.  Abnormal soft tissue density within the distal portion of the common bile duct and at the ze hepatitis.  The constellation of findings is most concerning for possible gallbladder neoplasm with extension to the ze habitus and associated biliary obstruction.  5/11/2024-CT abdomen/pelvis-gallbladder mass and soft tissue within the ze hepatis with enlarged portal caval node.  Soft tissue involvement of the biliary ducts and vasculature.  No evidence of metastatic disease within the chest. 5/11/2024 MR/MRCP-there is an approximately 2.2 cm obstructing mass centered at the bifurcation of the common hepatic duct with intrahepatic biliary duct dilatation, highly suspicious for cholangiocarcinoma (Klatskin tumor).  Masslike appearance of the gallbladder fundus with cystic changes measuring approximately 3.3 cm, which could represent either masslike adenoma I will mitosis versus a separate gallbladder mass.  No evidence of metastatic disease within the abdomen.  5/14/2024-EUS/ERCP 5/14/2024 (Dr. Dangelo) - proximal biliary mass without involvement of the portal vein, large ze hepatic LAD & gallbladder lesion noted - ERCP notable for hilar stricture s/p sphincterotomy, dilation of the stricture to 6 mm, brushing, biopsy, cholangioscopy & PLASTIC stent placed. *repeat in 3 months for stent removal/exchange if no sx done - cholangioscopy notable for abnormal mucosa of the proximal bile duct w/ narrowing and nodularity w/ decreased vascular pattern s/p spy bite bx ** biopsy of CBD structure c/w carcinoma, favor adeno Common bile duct stricture biopsy-small clusters of atypical cells, detached were within stroma, compatible with carcinoma and favor adenocarcinoma. Bile duct mass biopsy-minute fragments of fibrous tissue with marked crush artifact and rare, atypical cells singly or in small clusters.  5/28/2024-PET/CT scan- FDG-avid soft tissue within the ze hepatis, surrounding common bile duct stent, extending into gallbladder/gallbladder fossa, corresponds to known malignancy. FDG-avid soft tissue nodule in right parotid gland is nonspecific. Differential diagnosis includes benign and malignant salivary gland neoplasms and an intraparotid lymph node. Further evaluation may be performed with ultrasound and percutaneous needle biopsy. Indeterminate FDG-avid focus in fundus of uterus. Pelvic ultrasound/pelvic MRI may be obtained for further evaluation.  6/10/2024-Started neoadjuvant Gemcitabine/Cisplatin/Durvalumab.  Course complicated by biliary stent issues, Klebsiella bacteremia (7/1/2024).  7/2/2024-CT abdomen/pelvis-IMPRESSION: Adequate positioning of the biliary stent, however there is no  pneumobilia to suggest patency. Mild intrahepatic biliary ductal  dilatation. New from 6/4/2024 are ill-defined hypodensities scattered throughout the  bilateral hepatic lobes, suggestive of metastatic disease.  7/3/24-MRI Abdomen-IMPRESSION: 1.  Focal stricture of the common hepatic duct/common biliary duct  measuring 1.6 cm with progressive enhancement, consistent with  cholangiocarcinoma. CBD stent traverse through the focal stricture. Mild  intra and extrahepatic biliary ductal dilatation. 2.  Complex solid and cystic lesion in the gallbladder fossa with areas  of thickened septations/nodular enhancement. 3.  Multiple subcentimeter T2 hyperintense foci throughout the hepatic  parenchyma and few of which in the right hepatic lobe demonstrating  peripheral rim enhancement. Although nonspecific, these are suspicious  for microabscesses.  9/4/2024-MRI Abdomen-Resolution of right hepatic lesions, in keeping with infection. Bile duct mass, with associated common hepatic duct narrowing, without change. Slightly increased CBD dilatation with distal tapering. Unchanged intrahepatic biliary duct dilatation. Fundal gallbladder mass, slightly decreased in size. 11/6/2024-CT A/P-Fundal gallbladder mass is slightly decreased in size since 9/4/2024. CBD dilatation, unchanged. Known CBD mass is difficult to visualize on CT.  11/25/2024-Completed 8th cycle Gemcitabine/Cisplatin/Durvalumab.  12/18/2024-MRI abdomen/MRCP-. Since September 4, 2024, unchanged residual stricture at biliary ductal confluence and unchanged residual gallbladder tumor. No new suspicious finding.  1/2/2025-PET/CT scan-Compared to FDG PET/CT dated 5/28/2024: 1. FDG-avid lesion in ze hepatis adjacent to biliary stent and FDG-avid focus within the gallbladder, not well delineated on CT, are decreased in size and mildly decreased in metabolism, compatible with a partial response to interval therapy. 2. Resolution of small FDG-avid focus in right parotid region. 3. Resolution of FDG-avid focus in fundus of uterus. 4. No new lesion.  1/24/2025-Patient was scheduled for an exp lap hepatectomy & bile duct resection - however intra-operatively was noted to have positive peritoneal implants, case was aborted and biopsies taken, path: - peritoneal bx: moderately differentiated adeno - right diaphragmatic nodule: moderate to poorly differentiated adeno - peritoneal bx #2: moderately differentiated adeno PD-L1 TPS 75%, Her 2- (1+), pMMR  2/26/2025-CT CAP-IMPRESSION: Heterogeneity of the gallbladder fundus again noted. Multiple peritoneal implants consistent with metastatic disease.  [de-identified] : Adenocarcinoma [de-identified] : 5/21/2024-CA 19-9=290 [de-identified] : 7/15/2024-[facility] Liquid biopsy-ZHANE, TMB=1 Muts/Mb. ARID1A and TET2 mutations.  No  diagnostic alterations for [employer] liquid CDX were detected. [de-identified] : Reports lower back and lower right abdominal discomfort. No c/o N/V/D, fevers. No c/o abdominal pain. No SOB. No melena, bleeding reported. Has next biliary stent replacement 4/17/25.

## 2025-02-27 NOTE — CONSULT LETTER
[Dear  ___] : Dear  [unfilled], [Courtesy Letter:] : I had the pleasure of seeing your patient, [unfilled], in my office today. [Please see my note below.] : Please see my note below. [Consult Closing:] : Thank you very much for allowing me to participate in the care of this patient.  If you have any questions, please do not hesitate to contact me. [Sincerely,] : Sincerely, [FreeTextEntry3] : Margarita Manzanares MD

## 2025-02-27 NOTE — ASSESSMENT
[FreeTextEntry1] : Lab work  reviewed. #1) GB carcinoma-T4N1(Stage IIIC) clinically. 5/28/2024-PET/CT scan-. FDG-avid soft tissue within the ze hepatis, surrounding common bile duct stent, extending into gallbladder/gallbladder fossa, corresponds to known malignancy. FDG-avid soft tissue nodule in right parotid gland is nonspecific. Differential diagnosis includes benign and malignant salivary gland neoplasms and an intraparotid lymph node. Further evaluation may be performed with ultrasound and percutaneous needle biopsy. Indeterminate FDG-avid focus in fundus of uterus. Pelvic ultrasound/pelvic MRI may be obtained for further evaluation. -**Requested [facility], PD-L1 on pathology-insufficient tissue. 7/15/2024-[facility] Liquid biopsy-ZHANE, TMB=1 Muts/Mb. ARID1A and TET2 mutations.  No  diagnostic alterations for Foundation[employer] liquid CDX were detected. --had reviewed roles of surgery/radiation/systemic therapy in biliary tract cancers-requires a multidisciplinary approach.  5/30/2024-Had discussed case with surgeon Dr. Allison.  He recommended neoadjuvant systemic therapy with interval follow-up imaging at 2 months, and pending this, again at 4 months for surgical decisions.  6/10/2024-Started neoadjuvant Gemcitabine/Cisplatin/Durvalumab.  Course complicated by biliary stent issues, Klebsiella bacteremia. Following treatment of infection, resumed treatment.  With creatinine improved, resumed cisplatin.  Needed to further elucidate liver lesions: 7/2/2024-CT abdomen/pelvis-IMPRESSION: Adequate positioning of the biliary stent, however there is no  pneumobilia to suggest patency. Mild intrahepatic biliary ductal  dilatation. New from 6/4/2024 are ill-defined hypodensities scattered throughout the  bilateral hepatic lobes, suggestive of metastatic disease.  7/3/24-MRI Abdomen-IMPRESSION: 1.  Focal stricture of the common hepatic duct/common biliary duct  measuring 1.6 cm with progressive enhancement, consistent with  cholangiocarcinoma. CBD stent traverse through the focal stricture. Mild  intra and extrahepatic biliary ductal dilatation. 2.  Complex solid and cystic lesion in the gallbladder fossa with areas  of thickened septations/nodular enhancement. 3.  Multiple subcentimeter T2 hyperintense foci throughout the hepatic  parenchyma and few of which in the right hepatic lobe demonstrating  peripheral rim enhancement. Although nonspecific, these are suspicious  for microabscesses.  --?Micro abscesses vs. mets 7/18/2024-MRI Abdomen-1.  Several small right hepatic lobe liver lesions are without significant change from 7/3/2024. While favored to be inflammatory from prior cholangitis (improvement on imaging can lag behind clinical improvement), metastatic disease remains possible. Follow-up MRI abdomen recommended in 6-8 weeks. 2.  Bile duct mass and gallbladder mass are without significant change. 3.  Iron deposition within the liver. 9/4/2024-MRI Abdomen-Resolution of right hepatic lesions, in keeping with infection. Bile duct mass, with associated common hepatic duct narrowing, without change. Slightly increased CBD dilatation with distal tapering. Unchanged intrahepatic biliary duct dilatation. Fundal gallbladder mass, slightly decreased in size. 11/6/2024-CT A/P-Fundal gallbladder mass is slightly decreased in size since 9/4/2024. CBD dilatation, unchanged. Known CBD mass is difficult to visualize on CT.  11/25/2024-Completed 8th cycle Gemcitabine/Cisplatin/Durvalumab.  12/18/2024-MRI abdomen/MRCP-. Since September 4, 2024, unchanged residual stricture at biliary ductal confluence and unchanged residual gallbladder tumor. No new suspicious finding. 1/2/2025-PET/CT scan-Compared to FDG PET/CT dated 5/28/2024: 1. FDG-avid lesion in ze hepatis adjacent to biliary stent and FDG-avid focus within the gallbladder, not well delineated on CT, are decreased in size and mildly decreased in metabolism, compatible with a partial response to interval therapy. 2. Resolution of small FDG-avid focus in right parotid region. 3. Resolution of FDG-avid focus in fundus of uterus. 4. No new lesion.  **1/24/2025-Patient was scheduled for an exp lap hepatectomy & bile duct resection - however intra-operatively was noted to have positive peritoneal implants, case was aborted and biopsies taken, path: - peritoneal bx: moderately differentiated adenocarcinoma - right diaphragmatic nodule: moderate to poorly differentiated adenocarcinoma - peritoneal bx #2: moderately differentiated adenocarcinoma PD-L1 TPS 75%, Her 2- (1+), pMMR; Foundation testing pending per path report.  2/26/2025-CT C/A/P-Heterogeneity of the gallbladder fundus again noted. Multiple peritoneal implants consistent with metastatic disease. As patient now with measurable progression of disease on imaging, discussed with her treatment alternatives with respective pros/cons.  Recommend change in systemic therapy-to consider FOLFOX regimen.  Patient has given verbal/written consent for treatment, for which she is clinically stable to begin today. Note, received message from GI/hepatobiliary research team at [facility] 2/26/2025-they do not have any trial options for this patient at this time.  #2) FDG-avid soft tissue nodule in right parotid gland nonspecific on PET/CT scan 5/25/2024.  F/U right parotid US 7/30/2024-The right parotid gland is normal in size and echogenicity. Again noted is 8 x 7 x 5 mm heterogeneous predominantly isoechoic nodularity in the superficial midportion of the parotid gland, unchanged. Findings are nonspecific. Consider follow up ultrasound in 6 months. 9/2024-Saw ENT MD Dr. Cid. 9/30/2024-SALIVARY GLAND, PAROTID, RIGHT INFERIOR, US GUIDED FNA NON DIAGNOSTIC. Benign salivary gland tissue only Resolution of small FDG-avid focus in right parotid region on PET/CT scan 12/24/2024. ---continue f/u with ENT MD   #3) indeterminate FDG-avid focus in fundus of uterus on PET/CT scan 5/25/2024. Pelvic US 7/30/2024-Leiomyomatous uterus. Subcentimeter bilateral simple ovarian cyst. Trace fundal endometrial fluid. Resolution of FDG-avid focus in fundus of uterus on 12/24/2024 PET/CT scan. --has seen GYN MD 1/2024-yearly f/u as planned  Patient was given the opportunity to ask questions. Her questions have been answered to her apparent satisfaction at this time. She expressed her understanding and willingness to comply with recommended f/u.  -->FOLFOX: 5-Fluorouracil 400 mg/m2 IV bolus, then 2400 mg/m2 CIV/48 hours;  mg/m2 IV and Oxaliplatin 85 mg/m2 IV. Cycle every 14 days. Neulasta Onpro support. Cycle #1 today. RTO 2 weeks.

## 2025-02-27 NOTE — REVIEW OF SYSTEMS
[Diarrhea: Grade 0] : Diarrhea: Grade 0 [Negative] : Allergic/Immunologic [FreeTextEntry7] : abdominal discomfort [FreeTextEntry9] : back pain

## 2025-02-27 NOTE — REVIEW OF SYSTEMS
[Diarrhea: Grade 0] : Diarrhea: Grade 0 [Negative] : Allergic/Immunologic [Fever] : no fever [FreeTextEntry7] : right side abdominal discomfort [FreeTextEntry9] : back pain

## 2025-03-03 NOTE — HISTORY OF PRESENT ILLNESS
[de-identified] : 5/2024-Patient presented to [facility] with elevated LFTs. At that time, she also noted progressively darkening urine and pale stools for one week along with postprandial epigastric pain. 5/10/2024-Abdominal ultrasound-moderate intrahepatic and extrahepatic biliary dilatation.  Abnormal appearing gallbladder.  Abnormal soft tissue density within the distal portion of the common bile duct and at the ze hepatitis.  The constellation of findings is most concerning for possible gallbladder neoplasm with extension to the ze habitus and associated biliary obstruction.  5/11/2024-CT abdomen/pelvis-gallbladder mass and soft tissue within the ze hepatis with enlarged portal caval node.  Soft tissue involvement of the biliary ducts and vasculature.  No evidence of metastatic disease within the chest. 5/11/2024 MR/MRCP-there is an approximately 2.2 cm obstructing mass centered at the bifurcation of the common hepatic duct with intrahepatic biliary duct dilatation, highly suspicious for cholangiocarcinoma ([place] tumor).  Masslike appearance of the gallbladder fundus with cystic changes measuring approximately 3.3 cm, which could represent either masslike adenoma I will mitosis versus a separate gallbladder mass.  No evidence of metastatic disease within the abdomen.  5/14/2024-EUS/ERCP 5/14/2024 (Dr. Dangelo) - proximal biliary mass without involvement of the portal vein, large ze hepatic LAD & gallbladder lesion noted - ERCP notable for hilar stricture s/p sphincterotomy, dilation of the stricture to 6 mm, brushing, biopsy, cholangioscopy & PLASTIC stent placed. *repeat in 3 months for stent removal/exchange if no sx done - cholangioscopy notable for abnormal mucosa of the proximal bile duct w/ narrowing and nodularity w/ decreased vascular pattern s/p spy bite bx ** biopsy of CBD structure c/w carcinoma, favor adeno Common bile duct stricture biopsy-small clusters of atypical cells, detached were within stroma, compatible with carcinoma and favor adenocarcinoma. Bile duct mass biopsy-minute fragments of fibrous tissue with marked crush artifact and rare, atypical cells singly or in small clusters.  5/28/2024-PET/CT scan- FDG-avid soft tissue within the ze hepatis, surrounding common bile duct stent, extending into gallbladder/gallbladder fossa, corresponds to known malignancy. FDG-avid soft tissue nodule in right parotid gland is nonspecific. Differential diagnosis includes benign and malignant salivary gland neoplasms and an intraparotid lymph node. Further evaluation may be performed with ultrasound and percutaneous needle biopsy. Indeterminate FDG-avid focus in fundus of uterus. Pelvic ultrasound/pelvic MRI may be obtained for further evaluation.  6/10/2024-Started neoadjuvant Gemcitabine/Cisplatin/Durvalumab.  Course complicated by biliary stent issues, Klebsiella bacteremia (7/1/2024).  7/2/2024-CT abdomen/pelvis-IMPRESSION: Adequate positioning of the biliary stent, however there is no  pneumobilia to suggest patency. Mild intrahepatic biliary ductal  dilatation. New from 6/4/2024 are ill-defined hypodensities scattered throughout the  bilateral hepatic lobes, suggestive of metastatic disease.  7/3/24-MRI Abdomen-IMPRESSION: 1.  Focal stricture of the common hepatic duct/common biliary duct  measuring 1.6 cm with progressive enhancement, consistent with  cholangiocarcinoma. CBD stent traverse through the focal stricture. Mild  intra and extrahepatic biliary ductal dilatation. 2.  Complex solid and cystic lesion in the gallbladder fossa with areas  of thickened septations/nodular enhancement. 3.  Multiple subcentimeter T2 hyperintense foci throughout the hepatic  parenchyma and few of which in the right hepatic lobe demonstrating  peripheral rim enhancement. Although nonspecific, these are suspicious  for microabscesses.  9/4/2024-MRI Abdomen-Resolution of right hepatic lesions, in keeping with infection. Bile duct mass, with associated common hepatic duct narrowing, without change. Slightly increased CBD dilatation with distal tapering. Unchanged intrahepatic biliary duct dilatation. Fundal gallbladder mass, slightly decreased in size. 11/6/2024-CT A/P-Fundal gallbladder mass is slightly decreased in size since 9/4/2024. CBD dilatation, unchanged. Known CBD mass is difficult to visualize on CT.  11/25/2024-Completed 8th cycle Gemcitabine/Cisplatin/Durvalumab.  12/18/2024-MRI abdomen/MRCP-. Since September 4, 2024, unchanged residual stricture at biliary ductal confluence and unchanged residual gallbladder tumor. No new suspicious finding.  1/2/2025-PET/CT scan-Compared to FDG PET/CT dated 5/28/2024: 1. FDG-avid lesion in ze hepatis adjacent to biliary stent and FDG-avid focus within the gallbladder, not well delineated on CT, are decreased in size and mildly decreased in metabolism, compatible with a partial response to interval therapy. 2. Resolution of small FDG-avid focus in right parotid region. 3. Resolution of FDG-avid focus in fundus of uterus. 4. No new lesion.  1/24/2025-Patient was scheduled for an exp lap hepatectomy & bile duct resection - however intra-operatively was noted to have positive peritoneal implants, case was aborted and biopsies taken, path: - peritoneal bx: moderately differentiated adeno - right diaphragmatic nodule: moderate to poorly differentiated adeno - peritoneal bx #2: moderately differentiated adeno PD-L1 TPS 75%, Her 2- (1+), pMMR  2/26/2025-CT C/A/P-Heterogeneity of the gallbladder fundus again noted. Multiple peritoneal implants consistent with metastatic disease.  [de-identified] : Adenocarcinoma [de-identified] : 5/21/2024-CA [id number] [de-identified] : 7/15/2024-[facility] Liquid biopsy-ZHANE, TMB=1 Muts/Mb. ARID1A and TET2 mutations.  No  diagnostic alterations for [employer] liquid CDX were detected. [de-identified] : No N/V. Gets full faster when eats-eating smaller amounts at a time. No c/o diarrhea, fevers. No SOB. No melena, bleeding reported. Has next biliary stent replacement 4/17/25. Notes thinks had transient hives with 2nd or 3rd cycle of [place]/Cis chemo-received benadryl pre-remainder of cycles with no adverse reaction.

## 2025-03-03 NOTE — PHYSICAL EXAM
[de-identified] : surgical incision well healed-intact. Mild discomfort at right lower abdomen [de-identified] : no pain expressed on palpation

## 2025-03-03 NOTE — ASSESSMENT
[FreeTextEntry1] : Lab work  reviewed. #1) GB carcinoma-T4N1(Stage IIIC) clinically. 5/28/2024-PET/CT scan-. FDG-avid soft tissue within the ze hepatis, surrounding common bile duct stent, extending into gallbladder/gallbladder fossa, corresponds to known malignancy. FDG-avid soft tissue nodule in right parotid gland is nonspecific. Differential diagnosis includes benign and malignant salivary gland neoplasms and an intraparotid lymph node. Further evaluation may be performed with ultrasound and percutaneous needle biopsy. Indeterminate FDG-avid focus in fundus of uterus. Pelvic ultrasound/pelvic MRI may be obtained for further evaluation. -**Requested [facility], PD-L1 on pathology-insufficient tissue. 7/15/2024-[facility] Liquid biopsy-ZHANE, TMB=1 Muts/Mb. ARID1A and TET2 mutations.  No  diagnostic alterations for [employer] liquid CDX were detected. --had reviewed roles of surgery/radiation/systemic therapy in biliary tract cancers-requires a multidisciplinary approach.  5/30/2024-Had discussed case with surgeon Dr. Allison.  He recommended neoadjuvant systemic therapy with interval follow-up imaging at 2 months, and pending this, again at 4 months for surgical decisions.  6/10/2024-Started neoadjuvant Gemcitabine/Cisplatin/Durvalumab.  Course complicated by biliary stent issues, Klebsiella bacteremia. Following treatment of infection, resumed treatment.  With creatinine improved, resumed cisplatin.  Needed to further elucidate liver lesions: 7/2/2024-CT abdomen/pelvis-IMPRESSION: Adequate positioning of the biliary stent, however there is no  pneumobilia to suggest patency. Mild intrahepatic biliary ductal  dilatation. New from 6/4/2024 are ill-defined hypodensities scattered throughout the  bilateral hepatic lobes, suggestive of metastatic disease.  7/3/24-MRI Abdomen-IMPRESSION: 1.  Focal stricture of the common hepatic duct/common biliary duct  measuring 1.6 cm with progressive enhancement, consistent with  cholangiocarcinoma. CBD stent traverse through the focal stricture. Mild  intra and extrahepatic biliary ductal dilatation. 2.  Complex solid and cystic lesion in the gallbladder fossa with areas  of thickened septations/nodular enhancement. 3.  Multiple subcentimeter T2 hyperintense foci throughout the hepatic  parenchyma and few of which in the right hepatic lobe demonstrating  peripheral rim enhancement. Although nonspecific, these are suspicious  for microabscesses.  --?Micro abscesses vs. mets 7/18/2024-MRI Abdomen-1.  Several small right hepatic lobe liver lesions are without significant change from 7/3/2024. While favored to be inflammatory from prior cholangitis (improvement on imaging can lag behind clinical improvement), metastatic disease remains possible. Follow-up MRI abdomen recommended in 6-8 weeks. 2.  Bile duct mass and gallbladder mass are without significant change. 3.  Iron deposition within the liver. 9/4/2024-MRI Abdomen-Resolution of right hepatic lesions, in keeping with infection. Bile duct mass, with associated common hepatic duct narrowing, without change. Slightly increased CBD dilatation with distal tapering. Unchanged intrahepatic biliary duct dilatation. Fundal gallbladder mass, slightly decreased in size. 11/6/2024-CT A/P-Fundal gallbladder mass is slightly decreased in size since 9/4/2024. CBD dilatation, unchanged. Known CBD mass is difficult to visualize on CT.  11/25/2024-Completed 8th cycle Gemcitabine/Cisplatin/Durvalumab.  12/18/2024-MRI abdomen/MRCP-. Since September 4, 2024, unchanged residual stricture at biliary ductal confluence and unchanged residual gallbladder tumor. No new suspicious finding. 1/2/2025-PET/CT scan-Compared to FDG PET/CT dated 5/28/2024: 1. FDG-avid lesion in ze hepatis adjacent to biliary stent and FDG-avid focus within the gallbladder, not well delineated on CT, are decreased in size and mildly decreased in metabolism, compatible with a partial response to interval therapy. 2. Resolution of small FDG-avid focus in right parotid region. 3. Resolution of FDG-avid focus in fundus of uterus. 4. No new lesion.  **1/24/2025-Patient was scheduled for an exp lap hepatectomy & bile duct resection - however intra-operatively was noted to have positive peritoneal implants, case was aborted and biopsies taken, path: - peritoneal bx: moderately differentiated adenocarcinoma - right diaphragmatic nodule: moderate to poorly differentiated adenocarcinoma - peritoneal bx #2: moderately differentiated adenocarcinoma PD-L1 TPS 75%, Her 2- (1+), pMMR; Foundation testing pending per path report.  2/26/2025-CT C/A/P-Heterogeneity of the gallbladder fundus again noted. Multiple peritoneal implants consistent with metastatic disease. As patient now with measurable progression of disease on imaging, discussed with her treatment alternatives with respective pros/cons.  Recommend change in systemic therapy-to consider FOLFOX regimen.  Patient has given verbal/written consent for treatment, for which she is clinically stable to begin today. Note, received message from GI/hepatobiliary research team at [facility] 2/26/2025-they do not have any trial options for this patient at this time. --will give benadryl pre-oxaliplatin as did with cisplatin post an episode of hives with one of the earlier treatments with [place]-Cis (remainder of cycles without adverse reaction)-discussed with patient/-concur with plans. Treatment RN to monitor closely for s/sx. reaction as per protocol.  #2) FDG-avid soft tissue nodule in right parotid gland nonspecific on PET/CT scan 5/25/2024.  F/U right parotid US 7/30/2024-The right parotid gland is normal in size and echogenicity. Again noted is 8 x 7 x 5 mm heterogeneous predominantly isoechoic nodularity in the superficial midportion of the parotid gland, unchanged. Findings are nonspecific. Consider follow up ultrasound in 6 months. 9/2024-Saw ENT MD Dr. Cid. 9/30/2024-SALIVARY GLAND, PAROTID, RIGHT INFERIOR, US GUIDED FNA NON DIAGNOSTIC. Benign salivary gland tissue only Resolution of small FDG-avid focus in right parotid region on PET/CT scan 12/24/2024. ---continue f/u with ENT MD   #3) indeterminate FDG-avid focus in fundus of uterus on PET/CT scan 5/25/2024. Pelvic US 7/30/2024-Leiomyomatous uterus. Subcentimeter bilateral simple ovarian cyst. Trace fundal endometrial fluid. Resolution of FDG-avid focus in fundus of uterus on 12/24/2024 PET/CT scan. --has seen GYN MD 1/2024-yearly f/u as planned  Patient was given the opportunity to ask questions. Her questions have been answered to her apparent satisfaction at this time. She expressed her understanding and willingness to comply with recommended f/u.  -->FOLFOX: 5-Fluorouracil 400 mg/m2 IV bolus, then 2400 mg/m2 CIV/48 hours;  mg/m2 IV and Oxaliplatin 85 mg/m2 IV. Cycle every 14 days. Neulasta Onpro support. Cycle #1 today. RTO 2 weeks.

## 2025-03-17 NOTE — PHYSICAL EXAM
[Normal] : clear to auscultation bilaterally, no dullness, no wheezing [de-identified] : tachycardia [de-identified] : surgical incision well healed-intact. no pain express on RLQ [de-identified] : no pain expressed on palpation   [de-identified] : no pain express on palpation of back

## 2025-03-17 NOTE — ASSESSMENT
[FreeTextEntry1] : Lab work  reviewed, 3/17/25-CTA reviewed (addendum): #1) GB carcinoma-T4N1(Stage IIIC) clinically. 5/28/2024-PET/CT scan-. FDG-avid soft tissue within the ze hepatis, surrounding common bile duct stent, extending into gallbladder/gallbladder fossa, corresponds to known malignancy. FDG-avid soft tissue nodule in right parotid gland is nonspecific. Differential diagnosis includes benign and malignant salivary gland neoplasms and an intraparotid lymph node. Further evaluation may be performed with ultrasound and percutaneous needle biopsy. Indeterminate FDG-avid focus in fundus of uterus. Pelvic ultrasound/pelvic MRI may be obtained for further evaluation. -**Requested [employer] [facility], PD-L1 on pathology-insufficient tissue. 7/15/2024-[facility] Liquid biopsy-ZHANE, TMB=1 Muts/Mb. ARID1A and TET2 mutations.  No  diagnostic alterations for [employer] liquid CDX were detected. --had reviewed roles of surgery/radiation/systemic therapy in biliary tract cancers-requires a multidisciplinary approach.  5/30/2024-Had discussed case with surgeon Dr. Allison.  He recommended neoadjuvant systemic therapy with interval follow-up imaging at 2 months, and pending this, again at 4 months for surgical decisions.  6/10/2024-Started neoadjuvant Gemcitabine/Cisplatin/Durvalumab.  Course complicated by biliary stent issues, Klebsiella bacteremia. Following treatment of infection, resumed treatment.  With creatinine improved, resumed cisplatin.  Needed to further elucidate liver lesions: 7/2/2024-CT abdomen/pelvis-IMPRESSION: Adequate positioning of the biliary stent, however there is no  pneumobilia to suggest patency. Mild intrahepatic biliary ductal  dilatation. New from 6/4/2024 are ill-defined hypodensities scattered throughout the  bilateral hepatic lobes, suggestive of metastatic disease.  7/3/24-MRI Abdomen-IMPRESSION: 1.  Focal stricture of the common hepatic duct/common biliary duct  measuring 1.6 cm with progressive enhancement, consistent with  cholangiocarcinoma. CBD stent traverse through the focal stricture. Mild  intra and extrahepatic biliary ductal dilatation. 2.  Complex solid and cystic lesion in the gallbladder fossa with areas  of thickened septations/nodular enhancement. 3.  Multiple subcentimeter T2 hyperintense foci throughout the hepatic  parenchyma and few of which in the right hepatic lobe demonstrating  peripheral rim enhancement. Although nonspecific, these are suspicious  for microabscesses.  --?Micro abscesses vs. mets 7/18/2024-MRI Abdomen-1.  Several small right hepatic lobe liver lesions are without significant change from 7/3/2024. While favored to be inflammatory from prior cholangitis (improvement on imaging can lag behind clinical improvement), metastatic disease remains possible. Follow-up MRI abdomen recommended in 6-8 weeks. 2.  Bile duct mass and gallbladder mass are without significant change. 3.  Iron deposition within the liver. 9/4/2024-MRI Abdomen-Resolution of right hepatic lesions, in keeping with infection. Bile duct mass, with associated common hepatic duct narrowing, without change. Slightly increased CBD dilatation with distal tapering. Unchanged intrahepatic biliary duct dilatation. Fundal gallbladder mass, slightly decreased in size. 11/6/2024-CT A/P-Fundal gallbladder mass is slightly decreased in size since 9/4/2024. CBD dilatation, unchanged. Known CBD mass is difficult to visualize on CT.  11/25/2024-Completed 8th cycle Gemcitabine/Cisplatin/Durvalumab.  12/18/2024-MRI abdomen/MRCP-. Since September 4, 2024, unchanged residual stricture at biliary ductal confluence and unchanged residual gallbladder tumor. No new suspicious finding. 1/2/2025-PET/CT scan-Compared to FDG PET/CT dated 5/28/2024: 1. FDG-avid lesion in ze hepatis adjacent to biliary stent and FDG-avid focus within the gallbladder, not well delineated on CT, are decreased in size and mildly decreased in metabolism, compatible with a partial response to interval therapy. 2. Resolution of small FDG-avid focus in right parotid region. 3. Resolution of FDG-avid focus in fundus of uterus. 4. No new lesion.  **1/24/2025-Patient was scheduled for an exp lap hepatectomy & bile duct resection - however intra-operatively was noted to have positive peritoneal implants, case was aborted and biopsies taken, path: - peritoneal bx: moderately differentiated adenocarcinoma - right diaphragmatic nodule: moderate to poorly differentiated adenocarcinoma - peritoneal bx #2: moderately differentiated adenocarcinoma PD-L1 TPS 75%, Her 2- (1+), pMMR; Foundation testing pending per path report.  2/26/2025-CT C/A/P-Heterogeneity of the gallbladder fundus again noted. Multiple peritoneal implants consistent with metastatic disease. As patient now with measurable progression of disease on imaging, discussed with her treatment alternatives with respective pros/cons.  Recommend change in systemic therapy-to consider FOLFOX regimen.  Patient has given verbal/written consent for treatment, for which she is clinically stable to begin today. Note, received message from GI/hepatobiliary research team at [facility] 2/26/2025-they do not have any trial options for this patient at this time. --will give benadryl pre-oxaliplatin as did with cisplatin post an episode of hives with one of the earlier treatments with [place]-Cis (remainder of cycles without adverse reaction)-discussed with patient/-concur with plans. Treatment RN to monitor closely for s/sx. reaction as per protocol.  #2) FDG-avid soft tissue nodule in right parotid gland nonspecific on PET/CT scan 5/25/2024.  F/U right parotid US 7/30/2024-The right parotid gland is normal in size and echogenicity. Again noted is 8 x 7 x 5 mm heterogeneous predominantly isoechoic nodularity in the superficial midportion of the parotid gland, unchanged. Findings are nonspecific. Consider follow up ultrasound in 6 months. 9/2024-Saw ENT MD Dr. Cid. 9/30/2024-SALIVARY GLAND, PAROTID, RIGHT INFERIOR, US GUIDED FNA NON DIAGNOSTIC. Benign salivary gland tissue only Resolution of small FDG-avid focus in right parotid region on PET/CT scan 12/24/2024. ---continue f/u with ENT MD   #3) indeterminate FDG-avid focus in fundus of uterus on PET/CT scan 5/25/2024. Pelvic US 7/30/2024-Leiomyomatous uterus. Subcentimeter bilateral simple ovarian cyst. Trace fundal endometrial fluid. Resolution of FDG-avid focus in fundus of uterus on 12/24/2024 PET/CT scan. --has seen GYN MD 1/2024-yearly f/u as planned  #4) Palpitations/PINEDA/chest tightness: PE vs dehydration vs. elevated TSH --EKG done NSR - STAT CTA done 3/17/25-No evidence of pulmonary embolism. --referral to endocrinologist re: abnormal TSH --Stat CMP-result pending.  #5) PN, grade 1 (toes only)-consider gabapentin.  6) Diarrhea: Likely chemotherapy-related. Resolved. imodium PRN  Patient was given the opportunity to ask questions. Her questions have been answered to her apparent satisfaction at this time. She expressed her understanding and willingness to comply with recommended f/u.  -->FOLFOX: 5-Fluorouracil 400 mg/m2 IV bolus, then 2400 mg/m2 CIV/48 hours;  mg/m2 IV and Oxaliplatin 85 mg/m2 IV. Cycle every 14 days. Neulasta Onpro support. Cycle #2 today. RTO 2 weeks  Addendum: CTA negative for PE

## 2025-03-17 NOTE — ASSESSMENT
[FreeTextEntry1] : Lab work  reviewed, 3/17/25-CTA reviewed (addendum): #1) GB carcinoma-T4N1(Stage IIIC) clinically. 5/28/2024-PET/CT scan-. FDG-avid soft tissue within the ze hepatis, surrounding common bile duct stent, extending into gallbladder/gallbladder fossa, corresponds to known malignancy. FDG-avid soft tissue nodule in right parotid gland is nonspecific. Differential diagnosis includes benign and malignant salivary gland neoplasms and an intraparotid lymph node. Further evaluation may be performed with ultrasound and percutaneous needle biopsy. Indeterminate FDG-avid focus in fundus of uterus. Pelvic ultrasound/pelvic MRI may be obtained for further evaluation. -**Requested [facility], PD-L1 on pathology-insufficient tissue. 7/15/2024-[facility] Liquid biopsy-ZHANE, TMB=1 Muts/Mb. ARID1A and TET2 mutations.  No  diagnostic alterations for Foundation[facility] liquid CDX were detected. --had reviewed roles of surgery/radiation/systemic therapy in biliary tract cancers-requires a multidisciplinary approach.  5/30/2024-Had discussed case with surgeon Dr. Allison.  He recommended neoadjuvant systemic therapy with interval follow-up imaging at 2 months, and pending this, again at 4 months for surgical decisions.  6/10/2024-Started neoadjuvant Gemcitabine/Cisplatin/Durvalumab.  Course complicated by biliary stent issues, Klebsiella bacteremia. Following treatment of infection, resumed treatment.  With creatinine improved, resumed cisplatin.  Needed to further elucidate liver lesions: 7/2/2024-CT abdomen/pelvis-IMPRESSION: Adequate positioning of the biliary stent, however there is no  pneumobilia to suggest patency. Mild intrahepatic biliary ductal  dilatation. New from 6/4/2024 are ill-defined hypodensities scattered throughout the  bilateral hepatic lobes, suggestive of metastatic disease.  7/3/24-MRI Abdomen-IMPRESSION: 1.  Focal stricture of the common hepatic duct/common biliary duct  measuring 1.6 cm with progressive enhancement, consistent with  cholangiocarcinoma. CBD stent traverse through the focal stricture. Mild  intra and extrahepatic biliary ductal dilatation. 2.  Complex solid and cystic lesion in the gallbladder fossa with areas  of thickened septations/nodular enhancement. 3.  Multiple subcentimeter T2 hyperintense foci throughout the hepatic  parenchyma and few of which in the right hepatic lobe demonstrating  peripheral rim enhancement. Although nonspecific, these are suspicious  for microabscesses.  --?Micro abscesses vs. mets 7/18/2024-MRI Abdomen-1.  Several small right hepatic lobe liver lesions are without significant change from 7/3/2024. While favored to be inflammatory from prior cholangitis (improvement on imaging can lag behind clinical improvement), metastatic disease remains possible. Follow-up MRI abdomen recommended in 6-8 weeks. 2.  Bile duct mass and gallbladder mass are without significant change. 3.  Iron deposition within the liver. 9/4/2024-MRI Abdomen-Resolution of right hepatic lesions, in keeping with infection. Bile duct mass, with associated common hepatic duct narrowing, without change. Slightly increased CBD dilatation with distal tapering. Unchanged intrahepatic biliary duct dilatation. Fundal gallbladder mass, slightly decreased in size. 11/6/2024-CT A/P-Fundal gallbladder mass is slightly decreased in size since 9/4/2024. CBD dilatation, unchanged. Known CBD mass is difficult to visualize on CT.  11/25/2024-Completed 8th cycle Gemcitabine/Cisplatin/Durvalumab.  12/18/2024-MRI abdomen/MRCP-. Since September 4, 2024, unchanged residual stricture at biliary ductal confluence and unchanged residual gallbladder tumor. No new suspicious finding. 1/2/2025-PET/CT scan-Compared to FDG PET/CT dated 5/28/2024: 1. FDG-avid lesion in ze hepatis adjacent to biliary stent and FDG-avid focus within the gallbladder, not well delineated on CT, are decreased in size and mildly decreased in metabolism, compatible with a partial response to interval therapy. 2. Resolution of small FDG-avid focus in right parotid region. 3. Resolution of FDG-avid focus in fundus of uterus. 4. No new lesion.  **1/24/2025-Patient was scheduled for an exp lap hepatectomy & bile duct resection - however intra-operatively was noted to have positive peritoneal implants, case was aborted and biopsies taken, path: - peritoneal bx: moderately differentiated adenocarcinoma - right diaphragmatic nodule: moderate to poorly differentiated adenocarcinoma - peritoneal bx #2: moderately differentiated adenocarcinoma PD-L1 TPS 75%, Her 2- (1+), pMMR; Foundation testing pending per path report.  2/26/2025-CT C/A/P-Heterogeneity of the gallbladder fundus again noted. Multiple peritoneal implants consistent with metastatic disease. As patient now with measurable progression of disease on imaging, discussed with her treatment alternatives with respective pros/cons.  Recommend change in systemic therapy-to consider FOLFOX regimen.  Patient has given verbal/written consent for treatment, for which she is clinically stable to begin today. Note, received message from GI/hepatobiliary research team at [facility] 2/26/2025-they do not have any trial options for this patient at this time. --will give benadryl pre-oxaliplatin as did with cisplatin post an episode of hives with one of the earlier treatments with [place]-Cis (remainder of cycles without adverse reaction)-discussed with patient/-concur with plans. Treatment RN to monitor closely for s/sx. reaction as per protocol.  #2) FDG-avid soft tissue nodule in right parotid gland nonspecific on PET/CT scan 5/25/2024.  F/U right parotid US 7/30/2024-The right parotid gland is normal in size and echogenicity. Again noted is 8 x 7 x 5 mm heterogeneous predominantly isoechoic nodularity in the superficial midportion of the parotid gland, unchanged. Findings are nonspecific. Consider follow up ultrasound in 6 months. 9/2024-Saw ENT MD Dr. Cid. 9/30/2024-SALIVARY GLAND, PAROTID, RIGHT INFERIOR, US GUIDED FNA NON DIAGNOSTIC. Benign salivary gland tissue only Resolution of small FDG-avid focus in right parotid region on PET/CT scan 12/24/2024. ---continue f/u with ENT MD   #3) indeterminate FDG-avid focus in fundus of uterus on PET/CT scan 5/25/2024. Pelvic US 7/30/2024-Leiomyomatous uterus. Subcentimeter bilateral simple ovarian cyst. Trace fundal endometrial fluid. Resolution of FDG-avid focus in fundus of uterus on 12/24/2024 PET/CT scan. --has seen GYN MD 1/2024-yearly f/u as planned  #4) Palpitations/PINEDA/chest tightness: PE vs dehydration vs. elevated TSH --EKG done NSR - STAT CTA done 3/17/25-No evidence of pulmonary embolism. --referral to endocrinologist re: abnormal TSH --Stat CMP-result pending.  #5) PN, grade 1 (toes only)-consider gabapentin.  6) Diarrhea: Likely chemotherapy-related. Resolved. imodium PRN  Patient was given the opportunity to ask questions. Her questions have been answered to her apparent satisfaction at this time. She expressed her understanding and willingness to comply with recommended f/u.  -->FOLFOX: 5-Fluorouracil 400 mg/m2 IV bolus, then 2400 mg/m2 CIV/48 hours;  mg/m2 IV and Oxaliplatin 85 mg/m2 IV. Cycle every 14 days. Neulasta Onpro support. Cycle #2 today. RTO 2 weeks  Addendum: CTA negative for PE

## 2025-03-17 NOTE — ASSESSMENT
[FreeTextEntry1] : Lab work  reviewed, 3/17/25-CTA reviewed (addendum): #1) GB carcinoma-T4N1(Stage IIIC) clinically. 5/28/2024-PET/CT scan-. FDG-avid soft tissue within the ze hepatis, surrounding common bile duct stent, extending into gallbladder/gallbladder fossa, corresponds to known malignancy. FDG-avid soft tissue nodule in right parotid gland is nonspecific. Differential diagnosis includes benign and malignant salivary gland neoplasms and an intraparotid lymph node. Further evaluation may be performed with ultrasound and percutaneous needle biopsy. Indeterminate FDG-avid focus in fundus of uterus. Pelvic ultrasound/pelvic MRI may be obtained for further evaluation. -**Requested Foundation One, PD-L1 on pathology-insufficient tissue. 7/15/2024-FoundationOne Liquid biopsy-ZHANE, TMB=1 Muts/Mb. ARID1A and TET2 mutations.  No  diagnostic alterations for FoundationOne liquid CDX were detected. --had reviewed roles of surgery/radiation/systemic therapy in biliary tract cancers-requires a multidisciplinary approach.  5/30/2024-Had discussed case with surgeon Dr. Allison.  He recommended neoadjuvant systemic therapy with interval follow-up imaging at 2 months, and pending this, again at 4 months for surgical decisions.  6/10/2024-Started neoadjuvant Gemcitabine/Cisplatin/Durvalumab.  Course complicated by biliary stent issues, Klebsiella bacteremia. Following treatment of infection, resumed treatment.  With creatinine improved, resumed cisplatin.  Needed to further elucidate liver lesions: 7/2/2024-CT abdomen/pelvis-IMPRESSION: Adequate positioning of the biliary stent, however there is no  pneumobilia to suggest patency. Mild intrahepatic biliary ductal  dilatation. New from 6/4/2024 are ill-defined hypodensities scattered throughout the  bilateral hepatic lobes, suggestive of metastatic disease.  7/3/24-MRI Abdomen-IMPRESSION: 1.  Focal stricture of the common hepatic duct/common biliary duct  measuring 1.6 cm with progressive enhancement, consistent with  cholangiocarcinoma. CBD stent traverse through the focal stricture. Mild  intra and extrahepatic biliary ductal dilatation. 2.  Complex solid and cystic lesion in the gallbladder fossa with areas  of thickened septations/nodular enhancement. 3.  Multiple subcentimeter T2 hyperintense foci throughout the hepatic  parenchyma and few of which in the right hepatic lobe demonstrating  peripheral rim enhancement. Although nonspecific, these are suspicious  for microabscesses.  --?Micro abscesses vs. mets 7/18/2024-MRI Abdomen-1.  Several small right hepatic lobe liver lesions are without significant change from 7/3/2024. While favored to be inflammatory from prior cholangitis (improvement on imaging can lag behind clinical improvement), metastatic disease remains possible. Follow-up MRI abdomen recommended in 6-8 weeks. 2.  Bile duct mass and gallbladder mass are without significant change. 3.  Iron deposition within the liver. 9/4/2024-MRI Abdomen-Resolution of right hepatic lesions, in keeping with infection. Bile duct mass, with associated common hepatic duct narrowing, without change. Slightly increased CBD dilatation with distal tapering. Unchanged intrahepatic biliary duct dilatation. Fundal gallbladder mass, slightly decreased in size. 11/6/2024-CT A/P-Fundal gallbladder mass is slightly decreased in size since 9/4/2024. CBD dilatation, unchanged. Known CBD mass is difficult to visualize on CT.  11/25/2024-Completed 8th cycle Gemcitabine/Cisplatin/Durvalumab.  12/18/2024-MRI abdomen/MRCP-. Since September 4, 2024, unchanged residual stricture at biliary ductal confluence and unchanged residual gallbladder tumor. No new suspicious finding. 1/2/2025-PET/CT scan-Compared to FDG PET/CT dated 5/28/2024: 1. FDG-avid lesion in ze hepatis adjacent to biliary stent and FDG-avid focus within the gallbladder, not well delineated on CT, are decreased in size and mildly decreased in metabolism, compatible with a partial response to interval therapy. 2. Resolution of small FDG-avid focus in right parotid region. 3. Resolution of FDG-avid focus in fundus of uterus. 4. No new lesion.  **1/24/2025-Patient was scheduled for an exp lap hepatectomy & bile duct resection - however intra-operatively was noted to have positive peritoneal implants, case was aborted and biopsies taken, path: - peritoneal bx: moderately differentiated adenocarcinoma - right diaphragmatic nodule: moderate to poorly differentiated adenocarcinoma - peritoneal bx #2: moderately differentiated adenocarcinoma PD-L1 TPS 75%, Her 2- (1+), pMMR; Foundation testing pending per path report.  2/26/2025-CT C/A/P-Heterogeneity of the gallbladder fundus again noted. Multiple peritoneal implants consistent with metastatic disease. As patient now with measurable progression of disease on imaging, discussed with her treatment alternatives with respective pros/cons.  Recommend change in systemic therapy-to consider FOLFOX regimen.  Patient has given verbal/written consent for treatment, for which she is clinically stable to begin today. Note, received message from GI/hepatobiliary research team at [facility] 2/26/2025-they do not have any trial options for this patient at this time. --will give benadryl pre-oxaliplatin as did with cisplatin post an episode of hives with one of the earlier treatments with [place]-Cis (remainder of cycles without adverse reaction)-discussed with patient/-concur with plans. Treatment RN to monitor closely for s/sx. reaction as per protocol.  #2) FDG-avid soft tissue nodule in right parotid gland nonspecific on PET/CT scan 5/25/2024.  F/U right parotid US 7/30/2024-The right parotid gland is normal in size and echogenicity. Again noted is 8 x 7 x 5 mm heterogeneous predominantly isoechoic nodularity in the superficial midportion of the parotid gland, unchanged. Findings are nonspecific. Consider follow up ultrasound in 6 months. 9/2024-Saw ENT MD Dr. Cid. 9/30/2024-SALIVARY GLAND, PAROTID, RIGHT INFERIOR, US GUIDED FNA NON DIAGNOSTIC. Benign salivary gland tissue only Resolution of small FDG-avid focus in right parotid region on PET/CT scan 12/24/2024. ---continue f/u with ENT MD   #3) indeterminate FDG-avid focus in fundus of uterus on PET/CT scan 5/25/2024. Pelvic US 7/30/2024-Leiomyomatous uterus. Subcentimeter bilateral simple ovarian cyst. Trace fundal endometrial fluid. Resolution of FDG-avid focus in fundus of uterus on 12/24/2024 PET/CT scan. --has seen GYN MD 1/2024-yearly f/u as planned  #4) Palpitations/PINEDA/chest tightness: PE vs dehydration vs. elevated TSH --EKG done NSR - STAT CTA done 3/17/25-No evidence of pulmonary embolism. --referral to endocrinologist re: abnormal TSH --Stat CMP-result pending.  #5) PN, grade 1 (toes only)-consider gabapentin.  6) Diarrhea: Likely chemotherapy-related. Resolved. imodium PRN  Patient was given the opportunity to ask questions. Her questions have been answered to her apparent satisfaction at this time. She expressed her understanding and willingness to comply with recommended f/u.  -->FOLFOX: 5-Fluorouracil 400 mg/m2 IV bolus, then 2400 mg/m2 CIV/48 hours;  mg/m2 IV and Oxaliplatin 85 mg/m2 IV. Cycle every 14 days. Neulasta Onpro support. Cycle #2 today. RTO 2 weeks  Addendum: CTA negative for PE

## 2025-03-17 NOTE — PHYSICAL EXAM
[Normal] : clear to auscultation bilaterally, no dullness, no wheezing [de-identified] : tachycardia [de-identified] : surgical incision well healed-intact. no pain express on RLQ [de-identified] : no pain expressed on palpation   [de-identified] : no pain express on palpation of back

## 2025-03-17 NOTE — HISTORY OF PRESENT ILLNESS
[Disease: _____________________] : Disease: [unfilled] [de-identified] : 5/2024-Patient presented to [facility] with elevated LFTs. At that time, she also noted progressively darkening urine and pale stools for one week along with postprandial epigastric pain. 5/10/2024-Abdominal ultrasound-moderate intrahepatic and extrahepatic biliary dilatation.  Abnormal appearing gallbladder.  Abnormal soft tissue density within the distal portion of the common bile duct and at the ze hepatitis.  The constellation of findings is most concerning for possible gallbladder neoplasm with extension to the ze habitus and associated biliary obstruction.  5/11/2024-CT abdomen/pelvis-gallbladder mass and soft tissue within the ze hepatis with enlarged portal caval node.  Soft tissue involvement of the biliary ducts and vasculature.  No evidence of metastatic disease within the chest. 5/11/2024 MR/MRCP-there is an approximately 2.2 cm obstructing mass centered at the bifurcation of the common hepatic duct with intrahepatic biliary duct dilatation, highly suspicious for cholangiocarcinoma ([place] tumor).  Masslike appearance of the gallbladder fundus with cystic changes measuring approximately 3.3 cm, which could represent either masslike adenoma I will mitosis versus a separate gallbladder mass.  No evidence of metastatic disease within the abdomen.  5/14/2024-EUS/ERCP 5/14/2024 (Dr. Dangelo) - proximal biliary mass without involvement of the portal vein, large ze hepatic LAD & gallbladder lesion noted - ERCP notable for hilar stricture s/p sphincterotomy, dilation of the stricture to 6 mm, brushing, biopsy, cholangioscopy & PLASTIC stent placed. *repeat in 3 months for stent removal/exchange if no sx done - cholangioscopy notable for abnormal mucosa of the proximal bile duct w/ narrowing and nodularity w/ decreased vascular pattern s/p spy bite bx ** biopsy of CBD structure c/w carcinoma, favor adeno Common bile duct stricture biopsy-small clusters of atypical cells, detached were within stroma, compatible with carcinoma and favor adenocarcinoma. Bile duct mass biopsy-minute fragments of fibrous tissue with marked crush artifact and rare, atypical cells singly or in small clusters.  5/28/2024-PET/CT scan- FDG-avid soft tissue within the ze hepatis, surrounding common bile duct stent, extending into gallbladder/gallbladder fossa, corresponds to known malignancy. FDG-avid soft tissue nodule in right parotid gland is nonspecific. Differential diagnosis includes benign and malignant salivary gland neoplasms and an intraparotid lymph node. Further evaluation may be performed with ultrasound and percutaneous needle biopsy. Indeterminate FDG-avid focus in fundus of uterus. Pelvic ultrasound/pelvic MRI may be obtained for further evaluation.  6/10/2024-Started neoadjuvant Gemcitabine/Cisplatin/Durvalumab.  Course complicated by biliary stent issues, Klebsiella bacteremia (7/1/2024).  7/2/2024-CT abdomen/pelvis-IMPRESSION: Adequate positioning of the biliary stent, however there is no  pneumobilia to suggest patency. Mild intrahepatic biliary ductal  dilatation. New from 6/4/2024 are ill-defined hypodensities scattered throughout the  bilateral hepatic lobes, suggestive of metastatic disease.  7/3/24-MRI Abdomen-IMPRESSION: 1.  Focal stricture of the common hepatic duct/common biliary duct  measuring 1.6 cm with progressive enhancement, consistent with  cholangiocarcinoma. CBD stent traverse through the focal stricture. Mild  intra and extrahepatic biliary ductal dilatation. 2.  Complex solid and cystic lesion in the gallbladder fossa with areas  of thickened septations/nodular enhancement. 3.  Multiple subcentimeter T2 hyperintense foci throughout the hepatic  parenchyma and few of which in the right hepatic lobe demonstrating  peripheral rim enhancement. Although nonspecific, these are suspicious  for microabscesses.  9/4/2024-MRI Abdomen-Resolution of right hepatic lesions, in keeping with infection. Bile duct mass, with associated common hepatic duct narrowing, without change. Slightly increased CBD dilatation with distal tapering. Unchanged intrahepatic biliary duct dilatation. Fundal gallbladder mass, slightly decreased in size. 11/6/2024-CT A/P-Fundal gallbladder mass is slightly decreased in size since 9/4/2024. CBD dilatation, unchanged. Known CBD mass is difficult to visualize on CT.  11/25/2024-Completed 8th cycle Gemcitabine/Cisplatin/Durvalumab.  12/18/2024-MRI abdomen/MRCP-. Since September 4, 2024, unchanged residual stricture at biliary ductal confluence and unchanged residual gallbladder tumor. No new suspicious finding.  1/2/2025-PET/CT scan-Compared to FDG PET/CT dated 5/28/2024: 1. FDG-avid lesion in ze hepatis adjacent to biliary stent and FDG-avid focus within the gallbladder, not well delineated on CT, are decreased in size and mildly decreased in metabolism, compatible with a partial response to interval therapy. 2. Resolution of small FDG-avid focus in right parotid region. 3. Resolution of FDG-avid focus in fundus of uterus. 4. No new lesion.  1/24/2025-Patient was scheduled for an exp lap hepatectomy & bile duct resection - however intra-operatively was noted to have positive peritoneal implants, case was aborted and biopsies taken, path: - peritoneal bx: moderately differentiated adeno - right diaphragmatic nodule: moderate to poorly differentiated adeno - peritoneal bx #2: moderately differentiated adeno PD-L1 TPS 75%, Her 2- (1+), pMMR  2/26/2025-CT C/A/P-Heterogeneity of the gallbladder fundus again noted. Multiple peritoneal implants consistent with metastatic disease.  [de-identified] : Adenocarcinoma [de-identified] : 5/21/2024-CA [id number] [de-identified] : 7/15/2024-[facility] Liquid biopsy-ZHANE, TMB=1 Muts/Mb. ARID1A and TET2 mutations.  No  diagnostic alterations for [facility] liquid CDX were detected. [de-identified] : Reports palpitations, PINEDA. Also 'tightness' around upper abdominal area. Diarrhea x1, resolved with imodium. Tingling sensation in toes, none in fingertips.  No N/V. Gets full faster when eats-eating smaller amounts at a time. Has next biliary stent replacement 4/17/25. Denies chest pain, LE swelling, or fevers. No melena, bleeding reported.  Notes thinks had transient hives with 2nd or 3rd cycle of [place]/Cis chemo-received benadryl pre-remainder of cycles with no adverse reaction.

## 2025-03-17 NOTE — ADDENDUM
[FreeTextEntry1] : CT ANGIO CHEST PULM ART WAWIC  - ORDERED BY:  TONE BARRIGA PROCEDURE DATE:  03/17/2025  IMPRESSION: No evidence of pulmonary embolism.

## 2025-03-17 NOTE — PHYSICAL EXAM
[Normal] : clear to auscultation bilaterally, no dullness, no wheezing [de-identified] : tachycardia [de-identified] : surgical incision well healed-intact. no pain express on RLQ [de-identified] : no pain expressed on palpation   [de-identified] : no pain express on palpation of back

## 2025-03-17 NOTE — HISTORY OF PRESENT ILLNESS
[Disease: _____________________] : Disease: [unfilled] [de-identified] : 5/2024-Patient presented to [facility] with elevated LFTs. At that time, she also noted progressively darkening urine and pale stools for one week along with postprandial epigastric pain. 5/10/2024-Abdominal ultrasound-moderate intrahepatic and extrahepatic biliary dilatation.  Abnormal appearing gallbladder.  Abnormal soft tissue density within the distal portion of the common bile duct and at the ze hepatitis.  The constellation of findings is most concerning for possible gallbladder neoplasm with extension to the ze habitus and associated biliary obstruction.  5/11/2024-CT abdomen/pelvis-gallbladder mass and soft tissue within the ze hepatis with enlarged portal caval node.  Soft tissue involvement of the biliary ducts and vasculature.  No evidence of metastatic disease within the chest. 5/11/2024 MR/MRCP-there is an approximately 2.2 cm obstructing mass centered at the bifurcation of the common hepatic duct with intrahepatic biliary duct dilatation, highly suspicious for cholangiocarcinoma ([place] tumor).  Masslike appearance of the gallbladder fundus with cystic changes measuring approximately 3.3 cm, which could represent either masslike adenoma I will mitosis versus a separate gallbladder mass.  No evidence of metastatic disease within the abdomen.  5/14/2024-EUS/ERCP 5/14/2024 (Dr. Dangelo) - proximal biliary mass without involvement of the portal vein, large ze hepatic LAD & gallbladder lesion noted - ERCP notable for hilar stricture s/p sphincterotomy, dilation of the stricture to 6 mm, brushing, biopsy, cholangioscopy & PLASTIC stent placed. *repeat in 3 months for stent removal/exchange if no sx done - cholangioscopy notable for abnormal mucosa of the proximal bile duct w/ narrowing and nodularity w/ decreased vascular pattern s/p spy bite bx ** biopsy of CBD structure c/w carcinoma, favor adeno Common bile duct stricture biopsy-small clusters of atypical cells, detached were within stroma, compatible with carcinoma and favor adenocarcinoma. Bile duct mass biopsy-minute fragments of fibrous tissue with marked crush artifact and rare, atypical cells singly or in small clusters.  5/28/2024-PET/CT scan- FDG-avid soft tissue within the ze hepatis, surrounding common bile duct stent, extending into gallbladder/gallbladder fossa, corresponds to known malignancy. FDG-avid soft tissue nodule in right parotid gland is nonspecific. Differential diagnosis includes benign and malignant salivary gland neoplasms and an intraparotid lymph node. Further evaluation may be performed with ultrasound and percutaneous needle biopsy. Indeterminate FDG-avid focus in fundus of uterus. Pelvic ultrasound/pelvic MRI may be obtained for further evaluation.  6/10/2024-Started neoadjuvant Gemcitabine/Cisplatin/Durvalumab.  Course complicated by biliary stent issues, Klebsiella bacteremia (7/1/2024).  7/2/2024-CT abdomen/pelvis-IMPRESSION: Adequate positioning of the biliary stent, however there is no  pneumobilia to suggest patency. Mild intrahepatic biliary ductal  dilatation. New from 6/4/2024 are ill-defined hypodensities scattered throughout the  bilateral hepatic lobes, suggestive of metastatic disease.  7/3/24-MRI Abdomen-IMPRESSION: 1.  Focal stricture of the common hepatic duct/common biliary duct  measuring 1.6 cm with progressive enhancement, consistent with  cholangiocarcinoma. CBD stent traverse through the focal stricture. Mild  intra and extrahepatic biliary ductal dilatation. 2.  Complex solid and cystic lesion in the gallbladder fossa with areas  of thickened septations/nodular enhancement. 3.  Multiple subcentimeter T2 hyperintense foci throughout the hepatic  parenchyma and few of which in the right hepatic lobe demonstrating  peripheral rim enhancement. Although nonspecific, these are suspicious  for microabscesses.  9/4/2024-MRI Abdomen-Resolution of right hepatic lesions, in keeping with infection. Bile duct mass, with associated common hepatic duct narrowing, without change. Slightly increased CBD dilatation with distal tapering. Unchanged intrahepatic biliary duct dilatation. Fundal gallbladder mass, slightly decreased in size. 11/6/2024-CT A/P-Fundal gallbladder mass is slightly decreased in size since 9/4/2024. CBD dilatation, unchanged. Known CBD mass is difficult to visualize on CT.  11/25/2024-Completed 8th cycle Gemcitabine/Cisplatin/Durvalumab.  12/18/2024-MRI abdomen/MRCP-. Since September 4, 2024, unchanged residual stricture at biliary ductal confluence and unchanged residual gallbladder tumor. No new suspicious finding.  1/2/2025-PET/CT scan-Compared to FDG PET/CT dated 5/28/2024: 1. FDG-avid lesion in ze hepatis adjacent to biliary stent and FDG-avid focus within the gallbladder, not well delineated on CT, are decreased in size and mildly decreased in metabolism, compatible with a partial response to interval therapy. 2. Resolution of small FDG-avid focus in right parotid region. 3. Resolution of FDG-avid focus in fundus of uterus. 4. No new lesion.  1/24/2025-Patient was scheduled for an exp lap hepatectomy & bile duct resection - however intra-operatively was noted to have positive peritoneal implants, case was aborted and biopsies taken, path: - peritoneal bx: moderately differentiated adeno - right diaphragmatic nodule: moderate to poorly differentiated adeno - peritoneal bx #2: moderately differentiated adeno PD-L1 TPS 75%, Her 2- (1+), pMMR  2/26/2025-CT C/A/P-Heterogeneity of the gallbladder fundus again noted. Multiple peritoneal implants consistent with metastatic disease.  [de-identified] : Adenocarcinoma [de-identified] : 5/21/2024-CA 19-9=290 [de-identified] : 7/15/2024-[employer] Liquid biopsy-ZHANE, TMB=1 Muts/Mb. ARID1A and TET2 mutations.  No  diagnostic alterations for [employer] liquid CDX were detected. [de-identified] : Reports palpitations, PINEDA. Also 'tightness' around upper abdominal area. Diarrhea x1, resolved with imodium. Tingling sensation in toes, none in fingertips.  No N/V. Gets full faster when eats-eating smaller amounts at a time. Has next biliary stent replacement 4/17/25. Denies chest pain, LE swelling, or fevers. No melena, bleeding reported.  Notes thinks had transient hives with 2nd or 3rd cycle of [place]/Cis chemo-received benadryl pre-remainder of cycles with no adverse reaction.

## 2025-03-17 NOTE — REVIEW OF SYSTEMS
[Diarrhea: Grade 0] : Diarrhea: Grade 0 [Negative] : Allergic/Immunologic [Palpitations] : palpitations [SOB on Exertion] : shortness of breath during exertion [Diarrhea: Grade 1 - Increase of <4 stools per day over baseline; mild increase in ostomy output compared to baseline] : Diarrhea: Grade 1 - Increase of <4 stools per day over baseline; mild increase in ostomy output compared to baseline [Fever] : no fever [Chest Pain] : no chest pain [Lower Ext Edema] : no lower extremity edema [FreeTextEntry5] : 'tightness' around chest [FreeTextEntry7] : right side abdominal discomfort [FreeTextEntry9] : back pain, right side

## 2025-03-17 NOTE — HISTORY OF PRESENT ILLNESS
[Disease: _____________________] : Disease: [unfilled] [de-identified] : 5/2024-Patient presented to [facility] with elevated LFTs. At that time, she also noted progressively darkening urine and pale stools for one week along with postprandial epigastric pain. 5/10/2024-Abdominal ultrasound-moderate intrahepatic and extrahepatic biliary dilatation.  Abnormal appearing gallbladder.  Abnormal soft tissue density within the distal portion of the common bile duct and at the ze hepatitis.  The constellation of findings is most concerning for possible gallbladder neoplasm with extension to the ze habitus and associated biliary obstruction.  5/11/2024-CT abdomen/pelvis-gallbladder mass and soft tissue within the ze hepatis with enlarged portal caval node.  Soft tissue involvement of the biliary ducts and vasculature.  No evidence of metastatic disease within the chest. 5/11/2024 MR/MRCP-there is an approximately 2.2 cm obstructing mass centered at the bifurcation of the common hepatic duct with intrahepatic biliary duct dilatation, highly suspicious for cholangiocarcinoma ([place] tumor).  Masslike appearance of the gallbladder fundus with cystic changes measuring approximately 3.3 cm, which could represent either masslike adenoma I will mitosis versus a separate gallbladder mass.  No evidence of metastatic disease within the abdomen.  5/14/2024-EUS/ERCP 5/14/2024 (Dr. Dangelo) - proximal biliary mass without involvement of the portal vein, large ze hepatic LAD & gallbladder lesion noted - ERCP notable for hilar stricture s/p sphincterotomy, dilation of the stricture to 6 mm, brushing, biopsy, cholangioscopy & PLASTIC stent placed. *repeat in 3 months for stent removal/exchange if no sx done - cholangioscopy notable for abnormal mucosa of the proximal bile duct w/ narrowing and nodularity w/ decreased vascular pattern s/p spy bite bx ** biopsy of CBD structure c/w carcinoma, favor adeno Common bile duct stricture biopsy-small clusters of atypical cells, detached were within stroma, compatible with carcinoma and favor adenocarcinoma. Bile duct mass biopsy-minute fragments of fibrous tissue with marked crush artifact and rare, atypical cells singly or in small clusters.  5/28/2024-PET/CT scan- FDG-avid soft tissue within the ze hepatis, surrounding common bile duct stent, extending into gallbladder/gallbladder fossa, corresponds to known malignancy. FDG-avid soft tissue nodule in right parotid gland is nonspecific. Differential diagnosis includes benign and malignant salivary gland neoplasms and an intraparotid lymph node. Further evaluation may be performed with ultrasound and percutaneous needle biopsy. Indeterminate FDG-avid focus in fundus of uterus. Pelvic ultrasound/pelvic MRI may be obtained for further evaluation.  6/10/2024-Started neoadjuvant Gemcitabine/Cisplatin/Durvalumab.  Course complicated by biliary stent issues, Klebsiella bacteremia (7/1/2024).  7/2/2024-CT abdomen/pelvis-IMPRESSION: Adequate positioning of the biliary stent, however there is no  pneumobilia to suggest patency. Mild intrahepatic biliary ductal  dilatation. New from 6/4/2024 are ill-defined hypodensities scattered throughout the  bilateral hepatic lobes, suggestive of metastatic disease.  7/3/24-MRI Abdomen-IMPRESSION: 1.  Focal stricture of the common hepatic duct/common biliary duct  measuring 1.6 cm with progressive enhancement, consistent with  cholangiocarcinoma. CBD stent traverse through the focal stricture. Mild  intra and extrahepatic biliary ductal dilatation. 2.  Complex solid and cystic lesion in the gallbladder fossa with areas  of thickened septations/nodular enhancement. 3.  Multiple subcentimeter T2 hyperintense foci throughout the hepatic  parenchyma and few of which in the right hepatic lobe demonstrating  peripheral rim enhancement. Although nonspecific, these are suspicious  for microabscesses.  9/4/2024-MRI Abdomen-Resolution of right hepatic lesions, in keeping with infection. Bile duct mass, with associated common hepatic duct narrowing, without change. Slightly increased CBD dilatation with distal tapering. Unchanged intrahepatic biliary duct dilatation. Fundal gallbladder mass, slightly decreased in size. 11/6/2024-CT A/P-Fundal gallbladder mass is slightly decreased in size since 9/4/2024. CBD dilatation, unchanged. Known CBD mass is difficult to visualize on CT.  11/25/2024-Completed 8th cycle Gemcitabine/Cisplatin/Durvalumab.  12/18/2024-MRI abdomen/MRCP-. Since September 4, 2024, unchanged residual stricture at biliary ductal confluence and unchanged residual gallbladder tumor. No new suspicious finding.  1/2/2025-PET/CT scan-Compared to FDG PET/CT dated 5/28/2024: 1. FDG-avid lesion in ze hepatis adjacent to biliary stent and FDG-avid focus within the gallbladder, not well delineated on CT, are decreased in size and mildly decreased in metabolism, compatible with a partial response to interval therapy. 2. Resolution of small FDG-avid focus in right parotid region. 3. Resolution of FDG-avid focus in fundus of uterus. 4. No new lesion.  1/24/2025-Patient was scheduled for an exp lap hepatectomy & bile duct resection - however intra-operatively was noted to have positive peritoneal implants, case was aborted and biopsies taken, path: - peritoneal bx: moderately differentiated adeno - right diaphragmatic nodule: moderate to poorly differentiated adeno - peritoneal bx #2: moderately differentiated adeno PD-L1 TPS 75%, Her 2- (1+), pMMR  2/26/2025-CT C/A/P-Heterogeneity of the gallbladder fundus again noted. Multiple peritoneal implants consistent with metastatic disease.  [de-identified] : Adenocarcinoma [de-identified] : 5/21/2024-CA 19-9=290 [de-identified] : 7/15/2024-[employer] Liquid biopsy-ZHANE, TMB=1 Muts/Mb. ARID1A and TET2 mutations.  No  diagnostic alterations for [employer] liquid CDX were detected. [de-identified] : Reports palpitations, PINEDA. Also 'tightness' around upper abdominal area. Diarrhea x1, resolved with imodium. Tingling sensation in toes, none in fingertips.  No N/V. Gets full faster when eats-eating smaller amounts at a time. Has next biliary stent replacement 4/17/25. Denies chest pain, LE swelling, or fevers. No melena, bleeding reported.  Notes thinks had transient hives with 2nd or 3rd cycle of [place]/Cis chemo-received benadryl pre-remainder of cycles with no adverse reaction.   no

## 2025-03-17 NOTE — HISTORY OF PRESENT ILLNESS
[Disease: _____________________] : Disease: [unfilled] [de-identified] : 5/2024-Patient presented to [facility] with elevated LFTs. At that time, she also noted progressively darkening urine and pale stools for one week along with postprandial epigastric pain. 5/10/2024-Abdominal ultrasound-moderate intrahepatic and extrahepatic biliary dilatation.  Abnormal appearing gallbladder.  Abnormal soft tissue density within the distal portion of the common bile duct and at the ze hepatitis.  The constellation of findings is most concerning for possible gallbladder neoplasm with extension to the ze habitus and associated biliary obstruction.  5/11/2024-CT abdomen/pelvis-gallbladder mass and soft tissue within the ze hepatis with enlarged portal caval node.  Soft tissue involvement of the biliary ducts and vasculature.  No evidence of metastatic disease within the chest. 5/11/2024 MR/MRCP-there is an approximately 2.2 cm obstructing mass centered at the bifurcation of the common hepatic duct with intrahepatic biliary duct dilatation, highly suspicious for cholangiocarcinoma ([place] tumor).  Masslike appearance of the gallbladder fundus with cystic changes measuring approximately 3.3 cm, which could represent either masslike adenoma I will mitosis versus a separate gallbladder mass.  No evidence of metastatic disease within the abdomen.  5/14/2024-EUS/ERCP 5/14/2024 (Dr. Dangelo) - proximal biliary mass without involvement of the portal vein, large ze hepatic LAD & gallbladder lesion noted - ERCP notable for hilar stricture s/p sphincterotomy, dilation of the stricture to 6 mm, brushing, biopsy, cholangioscopy & PLASTIC stent placed. *repeat in 3 months for stent removal/exchange if no sx done - cholangioscopy notable for abnormal mucosa of the proximal bile duct w/ narrowing and nodularity w/ decreased vascular pattern s/p spy bite bx ** biopsy of CBD structure c/w carcinoma, favor adeno Common bile duct stricture biopsy-small clusters of atypical cells, detached were within stroma, compatible with carcinoma and favor adenocarcinoma. Bile duct mass biopsy-minute fragments of fibrous tissue with marked crush artifact and rare, atypical cells singly or in small clusters.  5/28/2024-PET/CT scan- FDG-avid soft tissue within the ze hepatis, surrounding common bile duct stent, extending into gallbladder/gallbladder fossa, corresponds to known malignancy. FDG-avid soft tissue nodule in right parotid gland is nonspecific. Differential diagnosis includes benign and malignant salivary gland neoplasms and an intraparotid lymph node. Further evaluation may be performed with ultrasound and percutaneous needle biopsy. Indeterminate FDG-avid focus in fundus of uterus. Pelvic ultrasound/pelvic MRI may be obtained for further evaluation.  6/10/2024-Started neoadjuvant Gemcitabine/Cisplatin/Durvalumab.  Course complicated by biliary stent issues, Klebsiella bacteremia (7/1/2024).  7/2/2024-CT abdomen/pelvis-IMPRESSION: Adequate positioning of the biliary stent, however there is no  pneumobilia to suggest patency. Mild intrahepatic biliary ductal  dilatation. New from 6/4/2024 are ill-defined hypodensities scattered throughout the  bilateral hepatic lobes, suggestive of metastatic disease.  7/3/24-MRI Abdomen-IMPRESSION: 1.  Focal stricture of the common hepatic duct/common biliary duct  measuring 1.6 cm with progressive enhancement, consistent with  cholangiocarcinoma. CBD stent traverse through the focal stricture. Mild  intra and extrahepatic biliary ductal dilatation. 2.  Complex solid and cystic lesion in the gallbladder fossa with areas  of thickened septations/nodular enhancement. 3.  Multiple subcentimeter T2 hyperintense foci throughout the hepatic  parenchyma and few of which in the right hepatic lobe demonstrating  peripheral rim enhancement. Although nonspecific, these are suspicious  for microabscesses.  9/4/2024-MRI Abdomen-Resolution of right hepatic lesions, in keeping with infection. Bile duct mass, with associated common hepatic duct narrowing, without change. Slightly increased CBD dilatation with distal tapering. Unchanged intrahepatic biliary duct dilatation. Fundal gallbladder mass, slightly decreased in size. 11/6/2024-CT A/P-Fundal gallbladder mass is slightly decreased in size since 9/4/2024. CBD dilatation, unchanged. Known CBD mass is difficult to visualize on CT.  11/25/2024-Completed 8th cycle Gemcitabine/Cisplatin/Durvalumab.  12/18/2024-MRI abdomen/MRCP-. Since September 4, 2024, unchanged residual stricture at biliary ductal confluence and unchanged residual gallbladder tumor. No new suspicious finding.  1/2/2025-PET/CT scan-Compared to FDG PET/CT dated 5/28/2024: 1. FDG-avid lesion in ze hepatis adjacent to biliary stent and FDG-avid focus within the gallbladder, not well delineated on CT, are decreased in size and mildly decreased in metabolism, compatible with a partial response to interval therapy. 2. Resolution of small FDG-avid focus in right parotid region. 3. Resolution of FDG-avid focus in fundus of uterus. 4. No new lesion.  1/24/2025-Patient was scheduled for an exp lap hepatectomy & bile duct resection - however intra-operatively was noted to have positive peritoneal implants, case was aborted and biopsies taken, path: - peritoneal bx: moderately differentiated adeno - right diaphragmatic nodule: moderate to poorly differentiated adeno - peritoneal bx #2: moderately differentiated adeno PD-L1 TPS 75%, Her 2- (1+), pMMR  2/26/2025-CT C/A/P-Heterogeneity of the gallbladder fundus again noted. Multiple peritoneal implants consistent with metastatic disease.  [de-identified] : Adenocarcinoma [de-identified] : 5/21/2024-CA 19-9=290 [de-identified] : 7/15/2024-[employer] Liquid biopsy-ZHANE, TMB=1 Muts/Mb. ARID1A and TET2 mutations.  No  diagnostic alterations for [employer] liquid CDX were detected. [de-identified] : Reports palpitations, PINEDA. Also 'tightness' around upper abdominal area. Diarrhea x1, resolved with imodium. Tingling sensation in toes, none in fingertips.  No N/V. Gets full faster when eats-eating smaller amounts at a time. Has next biliary stent replacement 4/17/25. Denies chest pain, LE swelling, or fevers. No melena, bleeding reported.  Notes thinks had transient hives with 2nd or 3rd cycle of [place]/Cis chemo-received benadryl pre-remainder of cycles with no adverse reaction.

## 2025-03-17 NOTE — ASSESSMENT
[FreeTextEntry1] : Lab work  reviewed, 3/17/25-CTA reviewed (addendum): #1) GB carcinoma-T4N1(Stage IIIC) clinically. 5/28/2024-PET/CT scan-. FDG-avid soft tissue within the ze hepatis, surrounding common bile duct stent, extending into gallbladder/gallbladder fossa, corresponds to known malignancy. FDG-avid soft tissue nodule in right parotid gland is nonspecific. Differential diagnosis includes benign and malignant salivary gland neoplasms and an intraparotid lymph node. Further evaluation may be performed with ultrasound and percutaneous needle biopsy. Indeterminate FDG-avid focus in fundus of uterus. Pelvic ultrasound/pelvic MRI may be obtained for further evaluation. -**Requested [facility], PD-L1 on pathology-insufficient tissue. 7/15/2024-[facility] Liquid biopsy-ZHANE, TMB=1 Muts/Mb. ARID1A and TET2 mutations.  No  diagnostic alterations for [facility] liquid CDX were detected. --had reviewed roles of surgery/radiation/systemic therapy in biliary tract cancers-requires a multidisciplinary approach.  5/30/2024-Had discussed case with surgeon Dr. Allison.  He recommended neoadjuvant systemic therapy with interval follow-up imaging at 2 months, and pending this, again at 4 months for surgical decisions.  6/10/2024-Started neoadjuvant Gemcitabine/Cisplatin/Durvalumab.  Course complicated by biliary stent issues, Klebsiella bacteremia. Following treatment of infection, resumed treatment.  With creatinine improved, resumed cisplatin.  Needed to further elucidate liver lesions: 7/2/2024-CT abdomen/pelvis-IMPRESSION: Adequate positioning of the biliary stent, however there is no  pneumobilia to suggest patency. Mild intrahepatic biliary ductal  dilatation. New from 6/4/2024 are ill-defined hypodensities scattered throughout the  bilateral hepatic lobes, suggestive of metastatic disease.  7/3/24-MRI Abdomen-IMPRESSION: 1.  Focal stricture of the common hepatic duct/common biliary duct  measuring 1.6 cm with progressive enhancement, consistent with  cholangiocarcinoma. CBD stent traverse through the focal stricture. Mild  intra and extrahepatic biliary ductal dilatation. 2.  Complex solid and cystic lesion in the gallbladder fossa with areas  of thickened septations/nodular enhancement. 3.  Multiple subcentimeter T2 hyperintense foci throughout the hepatic  parenchyma and few of which in the right hepatic lobe demonstrating  peripheral rim enhancement. Although nonspecific, these are suspicious  for microabscesses.  --?Micro abscesses vs. mets 7/18/2024-MRI Abdomen-1.  Several small right hepatic lobe liver lesions are without significant change from 7/3/2024. While favored to be inflammatory from prior cholangitis (improvement on imaging can lag behind clinical improvement), metastatic disease remains possible. Follow-up MRI abdomen recommended in 6-8 weeks. 2.  Bile duct mass and gallbladder mass are without significant change. 3.  Iron deposition within the liver. 9/4/2024-MRI Abdomen-Resolution of right hepatic lesions, in keeping with infection. Bile duct mass, with associated common hepatic duct narrowing, without change. Slightly increased CBD dilatation with distal tapering. Unchanged intrahepatic biliary duct dilatation. Fundal gallbladder mass, slightly decreased in size. 11/6/2024-CT A/P-Fundal gallbladder mass is slightly decreased in size since 9/4/2024. CBD dilatation, unchanged. Known CBD mass is difficult to visualize on CT.  11/25/2024-Completed 8th cycle Gemcitabine/Cisplatin/Durvalumab.  12/18/2024-MRI abdomen/MRCP-. Since September 4, 2024, unchanged residual stricture at biliary ductal confluence and unchanged residual gallbladder tumor. No new suspicious finding. 1/2/2025-PET/CT scan-Compared to FDG PET/CT dated 5/28/2024: 1. FDG-avid lesion in ze hepatis adjacent to biliary stent and FDG-avid focus within the gallbladder, not well delineated on CT, are decreased in size and mildly decreased in metabolism, compatible with a partial response to interval therapy. 2. Resolution of small FDG-avid focus in right parotid region. 3. Resolution of FDG-avid focus in fundus of uterus. 4. No new lesion.  **1/24/2025-Patient was scheduled for an exp lap hepatectomy & bile duct resection - however intra-operatively was noted to have positive peritoneal implants, case was aborted and biopsies taken, path: - peritoneal bx: moderately differentiated adenocarcinoma - right diaphragmatic nodule: moderate to poorly differentiated adenocarcinoma - peritoneal bx #2: moderately differentiated adenocarcinoma PD-L1 TPS 75%, Her 2- (1+), pMMR; Foundation testing pending per path report.  2/26/2025-CT C/A/P-Heterogeneity of the gallbladder fundus again noted. Multiple peritoneal implants consistent with metastatic disease. As patient now with measurable progression of disease on imaging, discussed with her treatment alternatives with respective pros/cons.  Recommend change in systemic therapy-to consider FOLFOX regimen.  Patient has given verbal/written consent for treatment, for which she is clinically stable to begin today. Note, received message from GI/hepatobiliary research team at [facility] 2/26/2025-they do not have any trial options for this patient at this time. --will give benadryl pre-oxaliplatin as did with cisplatin post an episode of hives with one of the earlier treatments with [place]-Cis (remainder of cycles without adverse reaction)-discussed with patient/-concur with plans. Treatment RN to monitor closely for s/sx. reaction as per protocol.  #2) FDG-avid soft tissue nodule in right parotid gland nonspecific on PET/CT scan 5/25/2024.  F/U right parotid US 7/30/2024-The right parotid gland is normal in size and echogenicity. Again noted is 8 x 7 x 5 mm heterogeneous predominantly isoechoic nodularity in the superficial midportion of the parotid gland, unchanged. Findings are nonspecific. Consider follow up ultrasound in 6 months. 9/2024-Saw ENT MD Dr. Cid. 9/30/2024-SALIVARY GLAND, PAROTID, RIGHT INFERIOR, US GUIDED FNA NON DIAGNOSTIC. Benign salivary gland tissue only Resolution of small FDG-avid focus in right parotid region on PET/CT scan 12/24/2024. ---continue f/u with ENT MD   #3) indeterminate FDG-avid focus in fundus of uterus on PET/CT scan 5/25/2024. Pelvic US 7/30/2024-Leiomyomatous uterus. Subcentimeter bilateral simple ovarian cyst. Trace fundal endometrial fluid. Resolution of FDG-avid focus in fundus of uterus on 12/24/2024 PET/CT scan. --has seen GYN MD 1/2024-yearly f/u as planned  #4) Palpitations/PINEDA/chest tightness: PE vs dehydration vs. elevated TSH --EKG done NSR - STAT CTA done 3/17/25-No evidence of pulmonary embolism. --referral to endocrinologist re: abnormal TSH --Stat CMP-result pending.  #5) PN, grade 1 (toes only)-consider gabapentin.  6) Diarrhea: Likely chemotherapy-related. Resolved. imodium PRN  Patient was given the opportunity to ask questions. Her questions have been answered to her apparent satisfaction at this time. She expressed her understanding and willingness to comply with recommended f/u.  -->FOLFOX: 5-Fluorouracil 400 mg/m2 IV bolus, then 2400 mg/m2 CIV/48 hours;  mg/m2 IV and Oxaliplatin 85 mg/m2 IV. Cycle every 14 days. Neulasta Onpro support. Cycle #2 today. RTO 2 weeks  Addendum: CTA negative for PE

## 2025-03-17 NOTE — PHYSICAL EXAM
[Normal] : clear to auscultation bilaterally, no dullness, no wheezing [de-identified] : tachycardia [de-identified] : surgical incision well healed-intact. no pain express on RLQ [de-identified] : no pain expressed on palpation   [de-identified] : no pain express on palpation of back

## 2025-03-19 NOTE — REVIEW OF SYSTEMS
[Fever] : no fever [Diarrhea: Grade 0] : Diarrhea: Grade 0 [Negative] : Allergic/Immunologic [FreeTextEntry7] : right side abdominal discomfort [FreeTextEntry9] : back pain

## 2025-03-19 NOTE — HISTORY OF PRESENT ILLNESS
[Disease: _____________________] : Disease: [unfilled] [de-identified] : 5/2024-Patient presented to [facility] with elevated LFTs. At that time, she also noted progressively darkening urine and pale stools for one week along with postprandial epigastric pain. 5/10/2024-Abdominal ultrasound-moderate intrahepatic and extrahepatic biliary dilatation.  Abnormal appearing gallbladder.  Abnormal soft tissue density within the distal portion of the common bile duct and at the ze hepatitis.  The constellation of findings is most concerning for possible gallbladder neoplasm with extension to the ze habitus and associated biliary obstruction.  5/11/2024-CT abdomen/pelvis-gallbladder mass and soft tissue within the ze hepatis with enlarged portal caval node.  Soft tissue involvement of the biliary ducts and vasculature.  No evidence of metastatic disease within the chest. 5/11/2024 MR/MRCP-there is an approximately 2.2 cm obstructing mass centered at the bifurcation of the common hepatic duct with intrahepatic biliary duct dilatation, highly suspicious for cholangiocarcinoma ([place] tumor).  Masslike appearance of the gallbladder fundus with cystic changes measuring approximately 3.3 cm, which could represent either masslike adenoma I will mitosis versus a separate gallbladder mass.  No evidence of metastatic disease within the abdomen.  5/14/2024-EUS/ERCP 5/14/2024 (Dr. Dangelo) - proximal biliary mass without involvement of the portal vein, large ze hepatic LAD & gallbladder lesion noted - ERCP notable for hilar stricture s/p sphincterotomy, dilation of the stricture to 6 mm, brushing, biopsy, cholangioscopy & PLASTIC stent placed. *repeat in 3 months for stent removal/exchange if no sx done - cholangioscopy notable for abnormal mucosa of the proximal bile duct w/ narrowing and nodularity w/ decreased vascular pattern s/p spy bite bx ** biopsy of CBD structure c/w carcinoma, favor adeno Common bile duct stricture biopsy-small clusters of atypical cells, detached were within stroma, compatible with carcinoma and favor adenocarcinoma. Bile duct mass biopsy-minute fragments of fibrous tissue with marked crush artifact and rare, atypical cells singly or in small clusters.  5/28/2024-PET/CT scan- FDG-avid soft tissue within the ze hepatis, surrounding common bile duct stent, extending into gallbladder/gallbladder fossa, corresponds to known malignancy. FDG-avid soft tissue nodule in right parotid gland is nonspecific. Differential diagnosis includes benign and malignant salivary gland neoplasms and an intraparotid lymph node. Further evaluation may be performed with ultrasound and percutaneous needle biopsy. Indeterminate FDG-avid focus in fundus of uterus. Pelvic ultrasound/pelvic MRI may be obtained for further evaluation.  6/10/2024-Started neoadjuvant Gemcitabine/Cisplatin/Durvalumab.  Course complicated by biliary stent issues, Klebsiella bacteremia (7/1/2024).  7/2/2024-CT abdomen/pelvis-IMPRESSION: Adequate positioning of the biliary stent, however there is no  pneumobilia to suggest patency. Mild intrahepatic biliary ductal  dilatation. New from 6/4/2024 are ill-defined hypodensities scattered throughout the  bilateral hepatic lobes, suggestive of metastatic disease.  7/3/24-MRI Abdomen-IMPRESSION: 1.  Focal stricture of the common hepatic duct/common biliary duct  measuring 1.6 cm with progressive enhancement, consistent with  cholangiocarcinoma. CBD stent traverse through the focal stricture. Mild  intra and extrahepatic biliary ductal dilatation. 2.  Complex solid and cystic lesion in the gallbladder fossa with areas  of thickened septations/nodular enhancement. 3.  Multiple subcentimeter T2 hyperintense foci throughout the hepatic  parenchyma and few of which in the right hepatic lobe demonstrating  peripheral rim enhancement. Although nonspecific, these are suspicious  for microabscesses.  9/4/2024-MRI Abdomen-Resolution of right hepatic lesions, in keeping with infection. Bile duct mass, with associated common hepatic duct narrowing, without change. Slightly increased CBD dilatation with distal tapering. Unchanged intrahepatic biliary duct dilatation. Fundal gallbladder mass, slightly decreased in size. 11/6/2024-CT A/P-Fundal gallbladder mass is slightly decreased in size since 9/4/2024. CBD dilatation, unchanged. Known CBD mass is difficult to visualize on CT.  11/25/2024-Completed 8th cycle Gemcitabine/Cisplatin/Durvalumab.  12/18/2024-MRI abdomen/MRCP-. Since September 4, 2024, unchanged residual stricture at biliary ductal confluence and unchanged residual gallbladder tumor. No new suspicious finding.  1/2/2025-PET/CT scan-Compared to FDG PET/CT dated 5/28/2024: 1. FDG-avid lesion in ze hepatis adjacent to biliary stent and FDG-avid focus within the gallbladder, not well delineated on CT, are decreased in size and mildly decreased in metabolism, compatible with a partial response to interval therapy. 2. Resolution of small FDG-avid focus in right parotid region. 3. Resolution of FDG-avid focus in fundus of uterus. 4. No new lesion.  1/24/2025-Patient was scheduled for an exp lap hepatectomy & bile duct resection - however intra-operatively was noted to have positive peritoneal implants, case was aborted and biopsies taken, path: - peritoneal bx: moderately differentiated adeno - right diaphragmatic nodule: moderate to poorly differentiated adeno - peritoneal bx #2: moderately differentiated adeno PD-L1 TPS 75%, Her 2- (1+), pMMR  2/26/2025-CT C/A/P-Heterogeneity of the gallbladder fundus again noted. Multiple peritoneal implants consistent with metastatic disease. 3/3/2025-Began FOLFOX.  [de-identified] : Adenocarcinoma [de-identified] : 5/21/2024-CA [id number] [de-identified] : 7/15/2024-[facility] Liquid biopsy-ZHANE, TMB=1 Muts/Mb. ARID1A and TET2 mutations.  No  diagnostic alterations for [facility] liquid CDX were detected. [de-identified] :   --No N/V. Gets full faster when eats-eating smaller amounts at a time. No c/o diarrhea, fevers. No SOB. No melena, bleeding reported. Has next biliary stent replacement 4/17/25. Notes thinks had transient hives with 2nd or 3rd cycle of [place]/Cis chemo-received benadryl pre-remainder of cycles with no adverse reaction.

## 2025-03-19 NOTE — ASSESSMENT
[FreeTextEntry1] : Lab work  reviewed. #1) GB carcinoma-T4N1(Stage IIIC) clinically. 5/28/2024-PET/CT scan-. FDG-avid soft tissue within the ze hepatis, surrounding common bile duct stent, extending into gallbladder/gallbladder fossa, corresponds to known malignancy. FDG-avid soft tissue nodule in right parotid gland is nonspecific. Differential diagnosis includes benign and malignant salivary gland neoplasms and an intraparotid lymph node. Further evaluation may be performed with ultrasound and percutaneous needle biopsy. Indeterminate FDG-avid focus in fundus of uterus. Pelvic ultrasound/pelvic MRI may be obtained for further evaluation. -**Requested Foundation One, PD-L1 on pathology-insufficient tissue. 7/15/2024-[facility] Liquid biopsy-ZHANE, TMB=1 Muts/Mb. ARID1A and TET2 mutations.  No  diagnostic alterations for Foundation[facility] liquid CDX were detected. --had reviewed roles of surgery/radiation/systemic therapy in biliary tract cancers-requires a multidisciplinary approach.  5/30/2024-Had discussed case with surgeon Dr. Allison.  He recommended neoadjuvant systemic therapy with interval follow-up imaging at 2 months, and pending this, again at 4 months for surgical decisions.  6/10/2024-Started neoadjuvant Gemcitabine/Cisplatin/Durvalumab.  Course complicated by biliary stent issues, Klebsiella bacteremia. Following treatment of infection, resumed treatment.  With creatinine improved, resumed cisplatin.  Needed to further elucidate liver lesions: 7/2/2024-CT abdomen/pelvis-IMPRESSION: Adequate positioning of the biliary stent, however there is no  pneumobilia to suggest patency. Mild intrahepatic biliary ductal  dilatation. New from 6/4/2024 are ill-defined hypodensities scattered throughout the  bilateral hepatic lobes, suggestive of metastatic disease.  7/3/24-MRI Abdomen-IMPRESSION: 1.  Focal stricture of the common hepatic duct/common biliary duct  measuring 1.6 cm with progressive enhancement, consistent with  cholangiocarcinoma. CBD stent traverse through the focal stricture. Mild  intra and extrahepatic biliary ductal dilatation. 2.  Complex solid and cystic lesion in the gallbladder fossa with areas  of thickened septations/nodular enhancement. 3.  Multiple subcentimeter T2 hyperintense foci throughout the hepatic  parenchyma and few of which in the right hepatic lobe demonstrating  peripheral rim enhancement. Although nonspecific, these are suspicious  for microabscesses.  --?Micro abscesses vs. mets 7/18/2024-MRI Abdomen-1.  Several small right hepatic lobe liver lesions are without significant change from 7/3/2024. While favored to be inflammatory from prior cholangitis (improvement on imaging can lag behind clinical improvement), metastatic disease remains possible. Follow-up MRI abdomen recommended in 6-8 weeks. 2.  Bile duct mass and gallbladder mass are without significant change. 3.  Iron deposition within the liver. 9/4/2024-MRI Abdomen-Resolution of right hepatic lesions, in keeping with infection. Bile duct mass, with associated common hepatic duct narrowing, without change. Slightly increased CBD dilatation with distal tapering. Unchanged intrahepatic biliary duct dilatation. Fundal gallbladder mass, slightly decreased in size. 11/6/2024-CT A/P-Fundal gallbladder mass is slightly decreased in size since 9/4/2024. CBD dilatation, unchanged. Known CBD mass is difficult to visualize on CT.  11/25/2024-Completed 8th cycle Gemcitabine/Cisplatin/Durvalumab.  12/18/2024-MRI abdomen/MRCP-. Since September 4, 2024, unchanged residual stricture at biliary ductal confluence and unchanged residual gallbladder tumor. No new suspicious finding. 1/2/2025-PET/CT scan-Compared to FDG PET/CT dated 5/28/2024: 1. FDG-avid lesion in ze hepatis adjacent to biliary stent and FDG-avid focus within the gallbladder, not well delineated on CT, are decreased in size and mildly decreased in metabolism, compatible with a partial response to interval therapy. 2. Resolution of small FDG-avid focus in right parotid region. 3. Resolution of FDG-avid focus in fundus of uterus. 4. No new lesion.  **1/24/2025-Patient was scheduled for an exp lap hepatectomy & bile duct resection - however intra-operatively was noted to have positive peritoneal implants, case was aborted and biopsies taken, path: - peritoneal bx: moderately differentiated adenocarcinoma - right diaphragmatic nodule: moderate to poorly differentiated adenocarcinoma - peritoneal bx #2: moderately differentiated adenocarcinoma PD-L1 TPS 75%, Her 2- (1+), pMMR; Foundation testing pending per path report.  2/26/2025-CT C/A/P-Heterogeneity of the gallbladder fundus again noted. Multiple peritoneal implants consistent with metastatic disease. As patient now with measurable progression of disease on imaging, recommended change in systemic therapy-to consider FOLFOX regimen.   3/3/3035-Began FOLFOX. Note, received message from GI/hepatobiliary research team at [facility] 2/26/2025-they do not have any trial options for this patient at this time. --clinically stable for treatment today --will give benadryl pre-oxaliplatin as did with cisplatin post an episode of hives with one of the earlier treatments with [place]-Cis (remainder of cycles without adverse reaction)-have discussed with patient/-they concurred with plans. Treatment RN to monitor closely for s/sx. reaction as per protocol.  #2) FDG-avid soft tissue nodule in right parotid gland nonspecific on PET/CT scan 5/25/2024.  F/U right parotid US 7/30/2024-The right parotid gland is normal in size and echogenicity. Again noted is 8 x 7 x 5 mm heterogeneous predominantly isoechoic nodularity in the superficial midportion of the parotid gland, unchanged. Findings are nonspecific. Consider follow up ultrasound in 6 months. 9/2024-Saw ENT MD Dr. Cid. 9/30/2024-SALIVARY GLAND, PAROTID, RIGHT INFERIOR, US GUIDED FNA NON DIAGNOSTIC. Benign salivary gland tissue only Resolution of small FDG-avid focus in right parotid region on PET/CT scan 12/24/2024. ---continue f/u with ENT MD   #3) indeterminate FDG-avid focus in fundus of uterus on PET/CT scan 5/25/2024. Pelvic US 7/30/2024-Leiomyomatous uterus. Subcentimeter bilateral simple ovarian cyst. Trace fundal endometrial fluid. Resolution of FDG-avid focus in fundus of uterus on 12/24/2024 PET/CT scan. --has seen GYN MD 1/2024-yearly f/u as planned  Patient was given the opportunity to ask questions. Her questions have been answered to her apparent satisfaction at this time. She expressed her understanding and willingness to comply with recommended f/u.  -->FOLFOX: 5-Fluorouracil 400 mg/m2 IV bolus, then 2400 mg/m2 CIV/48 hours;  mg/m2 IV and Oxaliplatin 85 mg/m2 IV. Cycle every 14 days. Neulasta Onpro support. Cycle #2 today. RTO 2 weeks.

## 2025-03-19 NOTE — PHYSICAL EXAM
[Normal] : affect appropriate [de-identified] : surgical incision well healed-intact. Mild discomfort at right lower abdomen [de-identified] : no pain expressed on palpation

## 2025-03-31 NOTE — HISTORY OF PRESENT ILLNESS
[de-identified] : 5/2024-Patient presented to [facility] with elevated LFTs. At that time, she also noted progressively darkening urine and pale stools for one week along with postprandial epigastric pain. 5/10/2024-Abdominal ultrasound-moderate intrahepatic and extrahepatic biliary dilatation.  Abnormal appearing gallbladder.  Abnormal soft tissue density within the distal portion of the common bile duct and at the ze hepatitis.  The constellation of findings is most concerning for possible gallbladder neoplasm with extension to the ze habitus and associated biliary obstruction.  5/11/2024-CT abdomen/pelvis-gallbladder mass and soft tissue within the ze hepatis with enlarged portal caval node.  Soft tissue involvement of the biliary ducts and vasculature.  No evidence of metastatic disease within the chest. 5/11/2024 MR/MRCP-there is an approximately 2.2 cm obstructing mass centered at the bifurcation of the common hepatic duct with intrahepatic biliary duct dilatation, highly suspicious for cholangiocarcinoma ([place] tumor).  Masslike appearance of the gallbladder fundus with cystic changes measuring approximately 3.3 cm, which could represent either masslike adenoma I will mitosis versus a separate gallbladder mass.  No evidence of metastatic disease within the abdomen.  5/14/2024-EUS/ERCP 5/14/2024 (Dr. Dangelo) - proximal biliary mass without involvement of the portal vein, large ze hepatic LAD & gallbladder lesion noted - ERCP notable for hilar stricture s/p sphincterotomy, dilation of the stricture to 6 mm, brushing, biopsy, cholangioscopy & PLASTIC stent placed. *repeat in 3 months for stent removal/exchange if no sx done - cholangioscopy notable for abnormal mucosa of the proximal bile duct w/ narrowing and nodularity w/ decreased vascular pattern s/p spy bite bx ** biopsy of CBD structure c/w carcinoma, favor adeno Common bile duct stricture biopsy-small clusters of atypical cells, detached were within stroma, compatible with carcinoma and favor adenocarcinoma. Bile duct mass biopsy-minute fragments of fibrous tissue with marked crush artifact and rare, atypical cells singly or in small clusters.  5/28/2024-PET/CT scan- FDG-avid soft tissue within the ze hepatis, surrounding common bile duct stent, extending into gallbladder/gallbladder fossa, corresponds to known malignancy. FDG-avid soft tissue nodule in right parotid gland is nonspecific. Differential diagnosis includes benign and malignant salivary gland neoplasms and an intraparotid lymph node. Further evaluation may be performed with ultrasound and percutaneous needle biopsy. Indeterminate FDG-avid focus in fundus of uterus. Pelvic ultrasound/pelvic MRI may be obtained for further evaluation.  6/10/2024-Started neoadjuvant Gemcitabine/Cisplatin/Durvalumab.  Course complicated by biliary stent issues, Klebsiella bacteremia (7/1/2024).  7/2/2024-CT abdomen/pelvis-IMPRESSION: Adequate positioning of the biliary stent, however there is no  pneumobilia to suggest patency. Mild intrahepatic biliary ductal  dilatation. New from 6/4/2024 are ill-defined hypodensities scattered throughout the  bilateral hepatic lobes, suggestive of metastatic disease.  7/3/24-MRI Abdomen-IMPRESSION: 1.  Focal stricture of the common hepatic duct/common biliary duct  measuring 1.6 cm with progressive enhancement, consistent with  cholangiocarcinoma. CBD stent traverse through the focal stricture. Mild  intra and extrahepatic biliary ductal dilatation. 2.  Complex solid and cystic lesion in the gallbladder fossa with areas  of thickened septations/nodular enhancement. 3.  Multiple subcentimeter T2 hyperintense foci throughout the hepatic  parenchyma and few of which in the right hepatic lobe demonstrating  peripheral rim enhancement. Although nonspecific, these are suspicious  for microabscesses.  9/4/2024-MRI Abdomen-Resolution of right hepatic lesions, in keeping with infection. Bile duct mass, with associated common hepatic duct narrowing, without change. Slightly increased CBD dilatation with distal tapering. Unchanged intrahepatic biliary duct dilatation. Fundal gallbladder mass, slightly decreased in size. 11/6/2024-CT A/P-Fundal gallbladder mass is slightly decreased in size since 9/4/2024. CBD dilatation, unchanged. Known CBD mass is difficult to visualize on CT.  11/25/2024-Completed 8th cycle Gemcitabine/Cisplatin/Durvalumab.  12/18/2024-MRI abdomen/MRCP-. Since September 4, 2024, unchanged residual stricture at biliary ductal confluence and unchanged residual gallbladder tumor. No new suspicious finding.  1/2/2025-PET/CT scan-Compared to FDG PET/CT dated 5/28/2024: 1. FDG-avid lesion in ze hepatis adjacent to biliary stent and FDG-avid focus within the gallbladder, not well delineated on CT, are decreased in size and mildly decreased in metabolism, compatible with a partial response to interval therapy. 2. Resolution of small FDG-avid focus in right parotid region. 3. Resolution of FDG-avid focus in fundus of uterus. 4. No new lesion.  1/24/2025-Patient was scheduled for an exp lap hepatectomy & bile duct resection - however intra-operatively was noted to have positive peritoneal implants, case was aborted and biopsies taken, path: - peritoneal bx: moderately differentiated adeno - right diaphragmatic nodule: moderate to poorly differentiated adeno - peritoneal bx #2: moderately differentiated adeno PD-L1 TPS 75%, Her 2- (1+), pMMR  2/26/2025-CT C/A/P-Heterogeneity of the gallbladder fundus again noted. Multiple peritoneal implants consistent with metastatic disease. 3/3/2025-Began FOLFOX.  [de-identified] : Adenocarcinoma [de-identified] : 5/21/2024-CA [id number] [de-identified] : 7/15/2024-[facility] Liquid biopsy-ZHANE, TMB=1 Muts/Mb. ARID1A and TET2 mutations.  No  diagnostic alterations for [employer] liquid CDX were detected. [de-identified] : Mild feet pins and needles. no difficulty walking or dropping things. No N/V. Eats smaller amounts at a time. [facility]. No c/o diarrhea, fevers. No SOB. No melena, bleeding reported. Has next biliary stent replacement 4/17/25. Notes thinks had transient hives with 2nd or 3rd cycle of [place]/Cis chemo-received benadryl pre-remainder of cycles with no adverse reaction.

## 2025-03-31 NOTE — PHYSICAL EXAM
[Restricted in physically strenuous activity but ambulatory and able to carry out work of a light or sedentary nature] : Status 1- Restricted in physically strenuous activity but ambulatory and able to carry out work of a light or sedentary nature, e.g., light house work, office work [de-identified] : surgical incision well healed-intact.  [de-identified] : no pain expressed on palpation

## 2025-03-31 NOTE — PHYSICAL EXAM
[Restricted in physically strenuous activity but ambulatory and able to carry out work of a light or sedentary nature] : Status 1- Restricted in physically strenuous activity but ambulatory and able to carry out work of a light or sedentary nature, e.g., light house work, office work [de-identified] : surgical incision well healed-intact.  [de-identified] : no pain expressed on palpation

## 2025-03-31 NOTE — REVIEW OF SYSTEMS
[FreeTextEntry7] : right side abdominal discomfort [Fever] : no fever [Negative] : Gastrointestinal [FreeTextEntry9] : back pain

## 2025-03-31 NOTE — ASSESSMENT
[FreeTextEntry1] : Lab work  reviewed. #1) GB carcinoma-T4N1(Stage IIIC) clinically. 5/28/2024-PET/CT scan-. FDG-avid soft tissue within the ze hepatis, surrounding common bile duct stent, extending into gallbladder/gallbladder fossa, corresponds to known malignancy. FDG-avid soft tissue nodule in right parotid gland is nonspecific. Differential diagnosis includes benign and malignant salivary gland neoplasms and an intraparotid lymph node. Further evaluation may be performed with ultrasound and percutaneous needle biopsy. Indeterminate FDG-avid focus in fundus of uterus. Pelvic ultrasound/pelvic MRI may be obtained for further evaluation. -**Requested [facility], PD-L1 on pathology-insufficient tissue. 7/15/2024-[facility] Liquid biopsy-ZHANE, TMB=1 Muts/Mb. ARID1A and TET2 mutations.  No  diagnostic alterations for [employer] liquid CDX were detected. --had reviewed roles of surgery/radiation/systemic therapy in biliary tract cancers-requires a multidisciplinary approach.  5/30/2024-Had discussed case with surgeon Dr. Allison.  He recommended neoadjuvant systemic therapy with interval follow-up imaging at 2 months, and pending this, again at 4 months for surgical decisions.  6/10/2024-Started neoadjuvant Gemcitabine/Cisplatin/Durvalumab.  Course complicated by biliary stent issues, Klebsiella bacteremia. Following treatment of infection, resumed treatment.  With creatinine improved, resumed cisplatin.  Needed to further elucidate liver lesions: 7/2/2024-CT abdomen/pelvis-IMPRESSION: Adequate positioning of the biliary stent, however there is no  pneumobilia to suggest patency. Mild intrahepatic biliary ductal  dilatation. New from 6/4/2024 are ill-defined hypodensities scattered throughout the  bilateral hepatic lobes, suggestive of metastatic disease.  7/3/24-MRI Abdomen-IMPRESSION: 1.  Focal stricture of the common hepatic duct/common biliary duct  measuring 1.6 cm with progressive enhancement, consistent with  cholangiocarcinoma. CBD stent traverse through the focal stricture. Mild  intra and extrahepatic biliary ductal dilatation. 2.  Complex solid and cystic lesion in the gallbladder fossa with areas  of thickened septations/nodular enhancement. 3.  Multiple subcentimeter T2 hyperintense foci throughout the hepatic  parenchyma and few of which in the right hepatic lobe demonstrating  peripheral rim enhancement. Although nonspecific, these are suspicious  for microabscesses.  --?Micro abscesses vs. mets 7/18/2024-MRI Abdomen-1.  Several small right hepatic lobe liver lesions are without significant change from 7/3/2024. While favored to be inflammatory from prior cholangitis (improvement on imaging can lag behind clinical improvement), metastatic disease remains possible. Follow-up MRI abdomen recommended in 6-8 weeks. 2.  Bile duct mass and gallbladder mass are without significant change. 3.  Iron deposition within the liver. 9/4/2024-MRI Abdomen-Resolution of right hepatic lesions, in keeping with infection. Bile duct mass, with associated common hepatic duct narrowing, without change. Slightly increased CBD dilatation with distal tapering. Unchanged intrahepatic biliary duct dilatation. Fundal gallbladder mass, slightly decreased in size. 11/6/2024-CT A/P-Fundal gallbladder mass is slightly decreased in size since 9/4/2024. CBD dilatation, unchanged. Known CBD mass is difficult to visualize on CT.  11/25/2024-Completed 8th cycle Gemcitabine/Cisplatin/Durvalumab.  12/18/2024-MRI abdomen/MRCP-. Since September 4, 2024, unchanged residual stricture at biliary ductal confluence and unchanged residual gallbladder tumor. No new suspicious finding. 1/2/2025-PET/CT scan-Compared to FDG PET/CT dated 5/28/2024: 1. FDG-avid lesion in ze hepatis adjacent to biliary stent and FDG-avid focus within the gallbladder, not well delineated on CT, are decreased in size and mildly decreased in metabolism, compatible with a partial response to interval therapy. 2. Resolution of small FDG-avid focus in right parotid region. 3. Resolution of FDG-avid focus in fundus of uterus. 4. No new lesion.  **1/24/2025-Patient was scheduled for an exp lap hepatectomy & bile duct resection - however intra-operatively was noted to have positive peritoneal implants, case was aborted and biopsies taken, path: - peritoneal bx: moderately differentiated adenocarcinoma - right diaphragmatic nodule: moderate to poorly differentiated adenocarcinoma - peritoneal bx #2: moderately differentiated adenocarcinoma PD-L1 TPS 75%, Her 2- (1+), pMMR; Foundation testing pending per path report.  2/26/2025-CT C/A/P-Heterogeneity of the gallbladder fundus again noted. Multiple peritoneal implants consistent with metastatic disease. As patient now with measurable progression of disease on imaging, recommended change in systemic therapy-to consider FOLFOX regimen.   3/3/3035-Began FOLFOX. Note, received message from GI/hepatobiliary research team at [facility] 2/26/2025-they do not have any trial options for this patient at this time. --holding chemo today due to thrombocytopenia --will give benadryl pre-oxaliplatin as did with cisplatin post an episode of hives with one of the earlier treatments with [place]-Cis (remainder of cycles without adverse reaction)-have discussed with patient/-they concurred with plans. Treatment RN to monitor closely for s/sx. reaction as per protocol.  #2) FDG-avid soft tissue nodule in right parotid gland nonspecific on PET/CT scan 5/25/2024.  F/U right parotid US 7/30/2024-The right parotid gland is normal in size and echogenicity. Again noted is 8 x 7 x 5 mm heterogeneous predominantly isoechoic nodularity in the superficial midportion of the parotid gland, unchanged. Findings are nonspecific. Consider follow up ultrasound in 6 months. 9/2024-Saw ENT MD Dr. Cid. 9/30/2024-SALIVARY GLAND, PAROTID, RIGHT INFERIOR, US GUIDED FNA NON DIAGNOSTIC. Benign salivary gland tissue only Resolution of small FDG-avid focus in right parotid region on PET/CT scan 12/24/2024. ---continue f/u with ENT MD   #3) indeterminate FDG-avid focus in fundus of uterus on PET/CT scan 5/25/2024. Pelvic US 7/30/2024-Leiomyomatous uterus. Subcentimeter bilateral simple ovarian cyst. Trace fundal endometrial fluid. Resolution of FDG-avid focus in fundus of uterus on 12/24/2024 PET/CT scan. --has seen GYN MD 1/2024-yearly f/u as planned  #4) mild neuropathy symptoms-to try gabapentin-potential side effect profile reviewed; foot massage  Patient was given the opportunity to ask questions. Her questions have been answered to her apparent satisfaction at this time. She expressed her understanding and willingness to comply with recommended f/u.  -->FOLFOX: 5-Fluorouracil 400 mg/m2 IV bolus, then 2400 mg/m2 CIV/48 hours;  mg/m2 IV and Oxaliplatin 85 mg/m2 IV. Cycle every 14 days. Neulasta Onpro support. Cycle #2 today held due to low platelets. RTO 1 week.

## 2025-03-31 NOTE — ASSESSMENT
[FreeTextEntry1] : Lab work  reviewed. #1) GB carcinoma-T4N1(Stage IIIC) clinically. 5/28/2024-PET/CT scan-. FDG-avid soft tissue within the ze hepatis, surrounding common bile duct stent, extending into gallbladder/gallbladder fossa, corresponds to known malignancy. FDG-avid soft tissue nodule in right parotid gland is nonspecific. Differential diagnosis includes benign and malignant salivary gland neoplasms and an intraparotid lymph node. Further evaluation may be performed with ultrasound and percutaneous needle biopsy. Indeterminate FDG-avid focus in fundus of uterus. Pelvic ultrasound/pelvic MRI may be obtained for further evaluation. -**Requested [facility], PD-L1 on pathology-insufficient tissue. 7/15/2024-[facility] Liquid biopsy-ZHANE, TMB=1 Muts/Mb. ARID1A and TET2 mutations.  No  diagnostic alterations for [facility] liquid CDX were detected. --had reviewed roles of surgery/radiation/systemic therapy in biliary tract cancers-requires a multidisciplinary approach.  5/30/2024-Had discussed case with surgeon Dr. Allison.  He recommended neoadjuvant systemic therapy with interval follow-up imaging at 2 months, and pending this, again at 4 months for surgical decisions.  6/10/2024-Started neoadjuvant Gemcitabine/Cisplatin/Durvalumab.  Course complicated by biliary stent issues, Klebsiella bacteremia. Following treatment of infection, resumed treatment.  With creatinine improved, resumed cisplatin.  Needed to further elucidate liver lesions: 7/2/2024-CT abdomen/pelvis-IMPRESSION: Adequate positioning of the biliary stent, however there is no  pneumobilia to suggest patency. Mild intrahepatic biliary ductal  dilatation. New from 6/4/2024 are ill-defined hypodensities scattered throughout the  bilateral hepatic lobes, suggestive of metastatic disease.  7/3/24-MRI Abdomen-IMPRESSION: 1.  Focal stricture of the common hepatic duct/common biliary duct  measuring 1.6 cm with progressive enhancement, consistent with  cholangiocarcinoma. CBD stent traverse through the focal stricture. Mild  intra and extrahepatic biliary ductal dilatation. 2.  Complex solid and cystic lesion in the gallbladder fossa with areas  of thickened septations/nodular enhancement. 3.  Multiple subcentimeter T2 hyperintense foci throughout the hepatic  parenchyma and few of which in the right hepatic lobe demonstrating  peripheral rim enhancement. Although nonspecific, these are suspicious  for microabscesses.  --?Micro abscesses vs. mets 7/18/2024-MRI Abdomen-1.  Several small right hepatic lobe liver lesions are without significant change from 7/3/2024. While favored to be inflammatory from prior cholangitis (improvement on imaging can lag behind clinical improvement), metastatic disease remains possible. Follow-up MRI abdomen recommended in 6-8 weeks. 2.  Bile duct mass and gallbladder mass are without significant change. 3.  Iron deposition within the liver. 9/4/2024-MRI Abdomen-Resolution of right hepatic lesions, in keeping with infection. Bile duct mass, with associated common hepatic duct narrowing, without change. Slightly increased CBD dilatation with distal tapering. Unchanged intrahepatic biliary duct dilatation. Fundal gallbladder mass, slightly decreased in size. 11/6/2024-CT A/P-Fundal gallbladder mass is slightly decreased in size since 9/4/2024. CBD dilatation, unchanged. Known CBD mass is difficult to visualize on CT.  11/25/2024-Completed 8th cycle Gemcitabine/Cisplatin/Durvalumab.  12/18/2024-MRI abdomen/MRCP-. Since September 4, 2024, unchanged residual stricture at biliary ductal confluence and unchanged residual gallbladder tumor. No new suspicious finding. 1/2/2025-PET/CT scan-Compared to FDG PET/CT dated 5/28/2024: 1. FDG-avid lesion in ze hepatis adjacent to biliary stent and FDG-avid focus within the gallbladder, not well delineated on CT, are decreased in size and mildly decreased in metabolism, compatible with a partial response to interval therapy. 2. Resolution of small FDG-avid focus in right parotid region. 3. Resolution of FDG-avid focus in fundus of uterus. 4. No new lesion.  **1/24/2025-Patient was scheduled for an exp lap hepatectomy & bile duct resection - however intra-operatively was noted to have positive peritoneal implants, case was aborted and biopsies taken, path: - peritoneal bx: moderately differentiated adenocarcinoma - right diaphragmatic nodule: moderate to poorly differentiated adenocarcinoma - peritoneal bx #2: moderately differentiated adenocarcinoma PD-L1 TPS 75%, Her 2- (1+), pMMR; Foundation testing pending per path report.  2/26/2025-CT C/A/P-Heterogeneity of the gallbladder fundus again noted. Multiple peritoneal implants consistent with metastatic disease. As patient now with measurable progression of disease on imaging, recommended change in systemic therapy-to consider FOLFOX regimen.   3/3/3035-Began FOLFOX. Note, received message from GI/hepatobiliary research team at [facility] 2/26/2025-they do not have any trial options for this patient at this time. --holding chemo today due to thrombocytopenia --will give benadryl pre-oxaliplatin as did with cisplatin post an episode of hives with one of the earlier treatments with [place]-Cis (remainder of cycles without adverse reaction)-have discussed with patient/-they concurred with plans. Treatment RN to monitor closely for s/sx. reaction as per protocol.  #2) FDG-avid soft tissue nodule in right parotid gland nonspecific on PET/CT scan 5/25/2024.  F/U right parotid US 7/30/2024-The right parotid gland is normal in size and echogenicity. Again noted is 8 x 7 x 5 mm heterogeneous predominantly isoechoic nodularity in the superficial midportion of the parotid gland, unchanged. Findings are nonspecific. Consider follow up ultrasound in 6 months. 9/2024-Saw ENT MD Dr. Cid. 9/30/2024-SALIVARY GLAND, PAROTID, RIGHT INFERIOR, US GUIDED FNA NON DIAGNOSTIC. Benign salivary gland tissue only Resolution of small FDG-avid focus in right parotid region on PET/CT scan 12/24/2024. ---continue f/u with ENT MD   #3) indeterminate FDG-avid focus in fundus of uterus on PET/CT scan 5/25/2024. Pelvic US 7/30/2024-Leiomyomatous uterus. Subcentimeter bilateral simple ovarian cyst. Trace fundal endometrial fluid. Resolution of FDG-avid focus in fundus of uterus on 12/24/2024 PET/CT scan. --has seen GYN MD 1/2024-yearly f/u as planned  #4) mild neuropathy symptoms-to try gabapentin-potential side effect profile reviewed; foot massage  Patient was given the opportunity to ask questions. Her questions have been answered to her apparent satisfaction at this time. She expressed her understanding and willingness to comply with recommended f/u.  -->FOLFOX: 5-Fluorouracil 400 mg/m2 IV bolus, then 2400 mg/m2 CIV/48 hours;  mg/m2 IV and Oxaliplatin 85 mg/m2 IV. Cycle every 14 days. Neulasta Onpro support. Cycle #2 today held due to low platelets. RTO 1 week.

## 2025-03-31 NOTE — HISTORY OF PRESENT ILLNESS
[de-identified] : 5/2024-Patient presented to [facility] with elevated LFTs. At that time, she also noted progressively darkening urine and pale stools for one week along with postprandial epigastric pain. 5/10/2024-Abdominal ultrasound-moderate intrahepatic and extrahepatic biliary dilatation.  Abnormal appearing gallbladder.  Abnormal soft tissue density within the distal portion of the common bile duct and at the ze hepatitis.  The constellation of findings is most concerning for possible gallbladder neoplasm with extension to the ze habitus and associated biliary obstruction.  5/11/2024-CT abdomen/pelvis-gallbladder mass and soft tissue within the ze hepatis with enlarged portal caval node.  Soft tissue involvement of the biliary ducts and vasculature.  No evidence of metastatic disease within the chest. 5/11/2024 MR/MRCP-there is an approximately 2.2 cm obstructing mass centered at the bifurcation of the common hepatic duct with intrahepatic biliary duct dilatation, highly suspicious for cholangiocarcinoma ([place] tumor).  Masslike appearance of the gallbladder fundus with cystic changes measuring approximately 3.3 cm, which could represent either masslike adenoma I will mitosis versus a separate gallbladder mass.  No evidence of metastatic disease within the abdomen.  5/14/2024-EUS/ERCP 5/14/2024 (Dr. Dangelo) - proximal biliary mass without involvement of the portal vein, large ze hepatic LAD & gallbladder lesion noted - ERCP notable for hilar stricture s/p sphincterotomy, dilation of the stricture to 6 mm, brushing, biopsy, cholangioscopy & PLASTIC stent placed. *repeat in 3 months for stent removal/exchange if no sx done - cholangioscopy notable for abnormal mucosa of the proximal bile duct w/ narrowing and nodularity w/ decreased vascular pattern s/p spy bite bx ** biopsy of CBD structure c/w carcinoma, favor adeno Common bile duct stricture biopsy-small clusters of atypical cells, detached were within stroma, compatible with carcinoma and favor adenocarcinoma. Bile duct mass biopsy-minute fragments of fibrous tissue with marked crush artifact and rare, atypical cells singly or in small clusters.  5/28/2024-PET/CT scan- FDG-avid soft tissue within the ze hepatis, surrounding common bile duct stent, extending into gallbladder/gallbladder fossa, corresponds to known malignancy. FDG-avid soft tissue nodule in right parotid gland is nonspecific. Differential diagnosis includes benign and malignant salivary gland neoplasms and an intraparotid lymph node. Further evaluation may be performed with ultrasound and percutaneous needle biopsy. Indeterminate FDG-avid focus in fundus of uterus. Pelvic ultrasound/pelvic MRI may be obtained for further evaluation.  6/10/2024-Started neoadjuvant Gemcitabine/Cisplatin/Durvalumab.  Course complicated by biliary stent issues, Klebsiella bacteremia (7/1/2024).  7/2/2024-CT abdomen/pelvis-IMPRESSION: Adequate positioning of the biliary stent, however there is no  pneumobilia to suggest patency. Mild intrahepatic biliary ductal  dilatation. New from 6/4/2024 are ill-defined hypodensities scattered throughout the  bilateral hepatic lobes, suggestive of metastatic disease.  7/3/24-MRI Abdomen-IMPRESSION: 1.  Focal stricture of the common hepatic duct/common biliary duct  measuring 1.6 cm with progressive enhancement, consistent with  cholangiocarcinoma. CBD stent traverse through the focal stricture. Mild  intra and extrahepatic biliary ductal dilatation. 2.  Complex solid and cystic lesion in the gallbladder fossa with areas  of thickened septations/nodular enhancement. 3.  Multiple subcentimeter T2 hyperintense foci throughout the hepatic  parenchyma and few of which in the right hepatic lobe demonstrating  peripheral rim enhancement. Although nonspecific, these are suspicious  for microabscesses.  9/4/2024-MRI Abdomen-Resolution of right hepatic lesions, in keeping with infection. Bile duct mass, with associated common hepatic duct narrowing, without change. Slightly increased CBD dilatation with distal tapering. Unchanged intrahepatic biliary duct dilatation. Fundal gallbladder mass, slightly decreased in size. 11/6/2024-CT A/P-Fundal gallbladder mass is slightly decreased in size since 9/4/2024. CBD dilatation, unchanged. Known CBD mass is difficult to visualize on CT.  11/25/2024-Completed 8th cycle Gemcitabine/Cisplatin/Durvalumab.  12/18/2024-MRI abdomen/MRCP-. Since September 4, 2024, unchanged residual stricture at biliary ductal confluence and unchanged residual gallbladder tumor. No new suspicious finding.  1/2/2025-PET/CT scan-Compared to FDG PET/CT dated 5/28/2024: 1. FDG-avid lesion in ze hepatis adjacent to biliary stent and FDG-avid focus within the gallbladder, not well delineated on CT, are decreased in size and mildly decreased in metabolism, compatible with a partial response to interval therapy. 2. Resolution of small FDG-avid focus in right parotid region. 3. Resolution of FDG-avid focus in fundus of uterus. 4. No new lesion.  1/24/2025-Patient was scheduled for an exp lap hepatectomy & bile duct resection - however intra-operatively was noted to have positive peritoneal implants, case was aborted and biopsies taken, path: - peritoneal bx: moderately differentiated adeno - right diaphragmatic nodule: moderate to poorly differentiated adeno - peritoneal bx #2: moderately differentiated adeno PD-L1 TPS 75%, Her 2- (1+), pMMR  2/26/2025-CT C/A/P-Heterogeneity of the gallbladder fundus again noted. Multiple peritoneal implants consistent with metastatic disease. 3/3/2025-Began FOLFOX.  [de-identified] : Adenocarcinoma [de-identified] : 5/21/2024-CA [id number] [de-identified] : 7/15/2024-FoundationOne Liquid biopsy-ZHANE, TMB=1 Muts/Mb. ARID1A and TET2 mutations.  No  diagnostic alterations for FoundationOne liquid CDX were detected. [de-identified] : Mild feet pins and needles. no difficulty walking or dropping things. No N/V. Eats smaller amounts at a time. [facility] to [facility]. No c/o diarrhea, fevers. No SOB. No melena, bleeding reported. Has next biliary stent replacement 4/17/25. Notes thinks had transient hives with 2nd or 3rd cycle of [place]/Cis chemo-received benadryl pre-remainder of cycles with no adverse reaction.

## 2025-04-16 NOTE — HISTORY OF PRESENT ILLNESS
[de-identified] : 74 year old female referred by Dr. 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  Recent PET on 1/2/2025 showed resolution of small FDG-avid focus in right parotid region She reports no new issues with dysphagia, dyspnea, or aspiration since last seen.  2/26/2025-CT C/A/P-Heterogeneity of the gallbladder fundus again noted. Multiple peritoneal implants consistent with metastatic disease. FNA from 9/17/24 showed pathology was Benign salivary gland tissue only-NON DIAGNOSTIC.  Started neoadjuvant Gemcitabine/ Cisplatin/ Durvalumab in June, now in conjunction with chemotherapy    9/4/24: MRI Neck :IMPRESSION: No enhancing lesion is visualized in the bilateral parotid glands. Subcentimeter periparotid lymph nodes deep to the bilateral parotid glands, the largest on the right. which correlates to site of FDG uptake on prior PET/CT, nonspecific.  No enhancing lesion is visualized in the bilateral parotid glands. Subcentimeter periparotid lymph nodes deep to the bilateral parotid glands, the largest on the right. which correlates to site of FDG uptake on prior PET/CT, nonspecific.   6/5/24: PET scan IMPRESSION: Abnormal skull-to-thigh FDG-PET/CT scan.  1. FDG-avid soft tissue within the ze hepatis, surrounding common bile duct stent, extending into gallbladder/gallbladder fossa, corresponds to known malignancy.  2. FDG-avid soft tissue nodule in right parotid gland is nonspecific. Differential diagnosis includes benign and malignant salivary gland neoplasms and an intraparotid lymph node. Further evaluation may be performed with ultrasound and percutaneous needle biopsy.  3. Indeterminate FDG-avid focus in fundus of uterus. Pelvic ultrasound/pelvic MRI may be obtained for further evaluation. 
Statement Selected

## 2025-04-16 NOTE — CONSULT LETTER
[Dear  ___] : Dear  [unfilled], [Consult Letter:] : I had the pleasure of evaluating your patient, [unfilled]. [Please see my note below.] : Please see my note below. [Sincerely,] : Sincerely, [FreeTextEntry2] : Dr. Manzanares [FreeTextEntry3] : Abdias Cid MD Division of Head and Neck Surgical OncologyDepartment of Otolaryngology Director, Head and Neck Cancer Immunotherapy Research , Otolaryngology Research Program [facility]. [facility] Research [email or web address]; cell [phone].

## 2025-04-16 NOTE — ASSESSMENT
[FreeTextEntry1] : 74 year old female referred by Dr. 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  Recent PET on 1/2/2025 showed resolution of small FDG-avid focus in right parotid region  -Reviewed imaging and pathology in depth with patient and   -No concerning masses seen on MRI or physical exam, which is reassuring ---only a few subcm intraparotid LNs, will continue to monitor.  --FNA showed no evidence of malignancy---normal parotid tissue/nondiagnostic -Will obtain repeat MRI in 6 months to evaluate for any changes and ensure resolution of likely intraparotid reactive LNs---if any interval growth will consider re-biopsy.  -Follow up in 6 months or sooner is any issues arise including new masses or lesions -They are in agreement with this plan

## 2025-04-21 NOTE — ASSESSMENT
[FreeTextEntry1] : Lab work, abdominal US results reviewed. #1) GB carcinoma-T4N1(Stage IIIC) clinically. 5/28/2024-PET/CT scan-. FDG-avid soft tissue within the ze hepatis, surrounding common bile duct stent, extending into gallbladder/gallbladder fossa, corresponds to known malignancy. FDG-avid soft tissue nodule in right parotid gland is nonspecific. Differential diagnosis includes benign and malignant salivary gland neoplasms and an intraparotid lymph node. Further evaluation may be performed with ultrasound and percutaneous needle biopsy. Indeterminate FDG-avid focus in fundus of uterus. Pelvic ultrasound/pelvic MRI may be obtained for further evaluation. -**Requested Foundation One, PD-L1 on pathology-insufficient tissue. 7/15/2024-FoundationOne Liquid biopsy-ZHANE, TMB=1 Muts/Mb. ARID1A and TET2 mutations.  No  diagnostic alterations for FoundationOne liquid CDX were detected. --had reviewed roles of surgery/radiation/systemic therapy in biliary tract cancers-requires a multidisciplinary approach.  5/30/2024-Had discussed case with surgeon Dr. Allison.  He recommended neoadjuvant systemic therapy with interval follow-up imaging at 2 months, and pending this, again at 4 months for surgical decisions.  6/10/2024-Started neoadjuvant Gemcitabine/Cisplatin/Durvalumab.  Course complicated by biliary stent issues, Klebsiella bacteremia. Following treatment of infection, resumed treatment.  With creatinine improved, resumed cisplatin.  Needed to further elucidate liver lesions: 7/2/2024-CT abdomen/pelvis-IMPRESSION: Adequate positioning of the biliary stent, however there is no  pneumobilia to suggest patency. Mild intrahepatic biliary ductal  dilatation. New from 6/4/2024 are ill-defined hypodensities scattered throughout the  bilateral hepatic lobes, suggestive of metastatic disease.  7/3/24-MRI Abdomen-IMPRESSION: 1.  Focal stricture of the common hepatic duct/common biliary duct  measuring 1.6 cm with progressive enhancement, consistent with  cholangiocarcinoma. CBD stent traverse through the focal stricture. Mild  intra and extrahepatic biliary ductal dilatation. 2.  Complex solid and cystic lesion in the gallbladder fossa with areas  of thickened septations/nodular enhancement. 3.  Multiple subcentimeter T2 hyperintense foci throughout the hepatic  parenchyma and few of which in the right hepatic lobe demonstrating  peripheral rim enhancement. Although nonspecific, these are suspicious  for microabscesses.  --?Micro abscesses vs. mets 7/18/2024-MRI Abdomen-1.  Several small right hepatic lobe liver lesions are without significant change from 7/3/2024. While favored to be inflammatory from prior cholangitis (improvement on imaging can lag behind clinical improvement), metastatic disease remains possible. Follow-up MRI abdomen recommended in 6-8 weeks. 2.  Bile duct mass and gallbladder mass are without significant change. 3.  Iron deposition within the liver. 9/4/2024-MRI Abdomen-Resolution of right hepatic lesions, in keeping with infection. Bile duct mass, with associated common hepatic duct narrowing, without change. Slightly increased CBD dilatation with distal tapering. Unchanged intrahepatic biliary duct dilatation. Fundal gallbladder mass, slightly decreased in size. 11/6/2024-CT A/P-Fundal gallbladder mass is slightly decreased in size since 9/4/2024. CBD dilatation, unchanged. Known CBD mass is difficult to visualize on CT.  11/25/2024-Completed 8th cycle Gemcitabine/Cisplatin/Durvalumab.  12/18/2024-MRI abdomen/MRCP-Since September 4, 2024, unchanged residual stricture at biliary ductal confluence and unchanged residual gallbladder tumor. No new suspicious finding. 1/2/2025-PET/CT scan-Compared to FDG PET/CT dated 5/28/2024: 1. FDG-avid lesion in ze hepatis adjacent to biliary stent and FDG-avid focus within the gallbladder, not well delineated on CT, are decreased in size and mildly decreased in metabolism, compatible with a partial response to interval therapy. 2. Resolution of small FDG-avid focus in right parotid region. 3. Resolution of FDG-avid focus in fundus of uterus. 4. No new lesion.  **1/24/2025-Patient was scheduled for an exp lap hepatectomy & bile duct resection - however intra-operatively was noted to have positive peritoneal implants, case was aborted and biopsies taken, path: - peritoneal bx: moderately differentiated adenocarcinoma - right diaphragmatic nodule: moderate to poorly differentiated adenocarcinoma - peritoneal bx #2: moderately differentiated adenocarcinoma PD-L1 TPS 75%, Her 2- (1+), pMMR; Foundation testing pending per path report.  2/26/2025-CT C/A/P-Heterogeneity of the gallbladder fundus again noted. Multiple peritoneal implants consistent with metastatic disease. As patient now with measurable progression of disease on imaging, recommended change in systemic therapy-to consider FOLFOX regimen.   3/3/3035-Began FOLFOX. Note, received message from GI/hepatobiliary research team at [facility] 2/26/2025-they do not have any trial options for this patient at this time. --clinically stable though thrombocytopenia d/w patient-holding chemo today with which she agrees --continue to monitor LFTs. 4/11/2025 Abdominal US-Known gallbladder malignancy with heterogeneous and ill-defined soft tissue extending from gallbladder into the adjacent liver. --will give benadryl pre-oxaliplatin as did with cisplatin post an episode of hives with one of the earlier treatments with [place]-Cis (remainder of cycles without adverse reaction)-have discussed with patient/-they concurred with plans. Treatment RN to monitor closely for s/sx. reaction as per protocol.  #2) FDG-avid soft tissue nodule in right parotid gland nonspecific on PET/CT scan 5/25/2024.  F/U right parotid US 7/30/2024-The right parotid gland is normal in size and echogenicity. Again noted is 8 x 7 x 5 mm heterogeneous predominantly isoechoic nodularity in the superficial midportion of the parotid gland, unchanged. Findings are nonspecific. Consider follow up ultrasound in 6 months. 9/2024-Saw ENT MD Dr. Cid. 9/30/2024-SALIVARY GLAND, PAROTID, RIGHT INFERIOR, US GUIDED FNA NON DIAGNOSTIC. Benign salivary gland tissue only Resolution of small FDG-avid focus in right parotid region on PET/CT scan 12/24/2024. ---continue f/u with ENT MD   #3) indeterminate FDG-avid focus in fundus of uterus on PET/CT scan 5/25/2024. Pelvic US 7/30/2024-Leiomyomatous uterus. Subcentimeter bilateral simple ovarian cyst. Trace fundal endometrial fluid. Resolution of FDG-avid focus in fundus of uterus on 12/24/2024 PET/CT scan. --has seen GYN MD 1/2024-yearly f/u as planned  #4) mild neuropathy symptoms-to try gabapentin-potential side effect profile reviewed; foot massage  Patient was given the opportunity to ask questions. Her questions have been answered to her apparent satisfaction at this time. She expressed her understanding and willingness to comply with recommended f/u.  -->FOLFOX: 5-Fluorouracil 400 mg/m2 IV bolus, then 2400 mg/m2 CIV/48 hours;  mg/m2 IV and Oxaliplatin 85 mg/m2 IV. Cycle every 14 days. Neulasta Onpro support. Cycle #4 held today secondary to thrombocytopenia. RTO 1 week.

## 2025-04-21 NOTE — ASSESSMENT
[FreeTextEntry1] : Lab work, abdominal US results reviewed. #1) GB carcinoma-T4N1(Stage IIIC) clinically. 5/28/2024-PET/CT scan-. FDG-avid soft tissue within the ze hepatis, surrounding common bile duct stent, extending into gallbladder/gallbladder fossa, corresponds to known malignancy. FDG-avid soft tissue nodule in right parotid gland is nonspecific. Differential diagnosis includes benign and malignant salivary gland neoplasms and an intraparotid lymph node. Further evaluation may be performed with ultrasound and percutaneous needle biopsy. Indeterminate FDG-avid focus in fundus of uterus. Pelvic ultrasound/pelvic MRI may be obtained for further evaluation. -**Requested [facility], PD-L1 on pathology-insufficient tissue. 7/15/2024-Foundation[facility] Liquid biopsy-ZHANE, TMB=1 Muts/Mb. ARID1A and TET2 mutations.  No  diagnostic alterations for FoundationOne liquid CDX were detected. --had reviewed roles of surgery/radiation/systemic therapy in biliary tract cancers-requires a multidisciplinary approach.  5/30/2024-Had discussed case with surgeon Dr. Allison.  He recommended neoadjuvant systemic therapy with interval follow-up imaging at 2 months, and pending this, again at 4 months for surgical decisions.  6/10/2024-Started neoadjuvant Gemcitabine/Cisplatin/Durvalumab.  Course complicated by biliary stent issues, Klebsiella bacteremia. Following treatment of infection, resumed treatment.  With creatinine improved, resumed cisplatin.  Needed to further elucidate liver lesions: 7/2/2024-CT abdomen/pelvis-IMPRESSION: Adequate positioning of the biliary stent, however there is no  pneumobilia to suggest patency. Mild intrahepatic biliary ductal  dilatation. New from 6/4/2024 are ill-defined hypodensities scattered throughout the  bilateral hepatic lobes, suggestive of metastatic disease.  7/3/24-MRI Abdomen-IMPRESSION: 1.  Focal stricture of the common hepatic duct/common biliary duct  measuring 1.6 cm with progressive enhancement, consistent with  cholangiocarcinoma. CBD stent traverse through the focal stricture. Mild  intra and extrahepatic biliary ductal dilatation. 2.  Complex solid and cystic lesion in the gallbladder fossa with areas  of thickened septations/nodular enhancement. 3.  Multiple subcentimeter T2 hyperintense foci throughout the hepatic  parenchyma and few of which in the right hepatic lobe demonstrating  peripheral rim enhancement. Although nonspecific, these are suspicious  for microabscesses.  --?Micro abscesses vs. mets 7/18/2024-MRI Abdomen-1.  Several small right hepatic lobe liver lesions are without significant change from 7/3/2024. While favored to be inflammatory from prior cholangitis (improvement on imaging can lag behind clinical improvement), metastatic disease remains possible. Follow-up MRI abdomen recommended in 6-8 weeks. 2.  Bile duct mass and gallbladder mass are without significant change. 3.  Iron deposition within the liver. 9/4/2024-MRI Abdomen-Resolution of right hepatic lesions, in keeping with infection. Bile duct mass, with associated common hepatic duct narrowing, without change. Slightly increased CBD dilatation with distal tapering. Unchanged intrahepatic biliary duct dilatation. Fundal gallbladder mass, slightly decreased in size. 11/6/2024-CT A/P-Fundal gallbladder mass is slightly decreased in size since 9/4/2024. CBD dilatation, unchanged. Known CBD mass is difficult to visualize on CT.  11/25/2024-Completed 8th cycle Gemcitabine/Cisplatin/Durvalumab.  12/18/2024-MRI abdomen/MRCP-Since September 4, 2024, unchanged residual stricture at biliary ductal confluence and unchanged residual gallbladder tumor. No new suspicious finding. 1/2/2025-PET/CT scan-Compared to FDG PET/CT dated 5/28/2024: 1. FDG-avid lesion in ze hepatis adjacent to biliary stent and FDG-avid focus within the gallbladder, not well delineated on CT, are decreased in size and mildly decreased in metabolism, compatible with a partial response to interval therapy. 2. Resolution of small FDG-avid focus in right parotid region. 3. Resolution of FDG-avid focus in fundus of uterus. 4. No new lesion.  **1/24/2025-Patient was scheduled for an exp lap hepatectomy & bile duct resection - however intra-operatively was noted to have positive peritoneal implants, case was aborted and biopsies taken, path: - peritoneal bx: moderately differentiated adenocarcinoma - right diaphragmatic nodule: moderate to poorly differentiated adenocarcinoma - peritoneal bx #2: moderately differentiated adenocarcinoma PD-L1 TPS 75%, Her 2- (1+), pMMR; Foundation testing pending per path report.  2/26/2025-CT C/A/P-Heterogeneity of the gallbladder fundus again noted. Multiple peritoneal implants consistent with metastatic disease. As patient now with measurable progression of disease on imaging, recommended change in systemic therapy-to consider FOLFOX regimen.   3/3/3035-Began FOLFOX. Note, received message from GI/hepatobiliary research team at [facility] 2/26/2025-they do not have any trial options for this patient at this time. --clinically stable though thrombocytopenia d/w patient-holding chemo today with which she agrees --continue to monitor LFTs. 4/11/2025 Abdominal US-Known gallbladder malignancy with heterogeneous and ill-defined soft tissue extending from gallbladder into the adjacent liver. --will give benadryl pre-oxaliplatin as did with cisplatin post an episode of hives with one of the earlier treatments with [place]-Cis (remainder of cycles without adverse reaction)-have discussed with patient/-they concurred with plans. Treatment RN to monitor closely for s/sx. reaction as per protocol.  #2) FDG-avid soft tissue nodule in right parotid gland nonspecific on PET/CT scan 5/25/2024.  F/U right parotid US 7/30/2024-The right parotid gland is normal in size and echogenicity. Again noted is 8 x 7 x 5 mm heterogeneous predominantly isoechoic nodularity in the superficial midportion of the parotid gland, unchanged. Findings are nonspecific. Consider follow up ultrasound in 6 months. 9/2024-Saw ENT MD Dr. Cid. 9/30/2024-SALIVARY GLAND, PAROTID, RIGHT INFERIOR, US GUIDED FNA NON DIAGNOSTIC. Benign salivary gland tissue only Resolution of small FDG-avid focus in right parotid region on PET/CT scan 12/24/2024. ---continue f/u with ENT MD   #3) indeterminate FDG-avid focus in fundus of uterus on PET/CT scan 5/25/2024. Pelvic US 7/30/2024-Leiomyomatous uterus. Subcentimeter bilateral simple ovarian cyst. Trace fundal endometrial fluid. Resolution of FDG-avid focus in fundus of uterus on 12/24/2024 PET/CT scan. --has seen GYN MD 1/2024-yearly f/u as planned  #4) mild neuropathy symptoms-to try gabapentin-potential side effect profile reviewed; foot massage  Patient was given the opportunity to ask questions. Her questions have been answered to her apparent satisfaction at this time. She expressed her understanding and willingness to comply with recommended f/u.  -->FOLFOX: 5-Fluorouracil 400 mg/m2 IV bolus, then 2400 mg/m2 CIV/48 hours;  mg/m2 IV and Oxaliplatin 85 mg/m2 IV. Cycle every 14 days. Neulasta Onpro support. Cycle #4 held today secondary to thrombocytopenia. RTO 1 week.

## 2025-04-21 NOTE — HISTORY OF PRESENT ILLNESS
[de-identified] : 5/2024-Patient presented to [facility] with elevated LFTs. At that time, she also noted progressively darkening urine and pale stools for one week along with postprandial epigastric pain. 5/10/2024-Abdominal ultrasound-moderate intrahepatic and extrahepatic biliary dilatation.  Abnormal appearing gallbladder.  Abnormal soft tissue density within the distal portion of the common bile duct and at the ze hepatitis.  The constellation of findings is most concerning for possible gallbladder neoplasm with extension to the ze habitus and associated biliary obstruction.  5/11/2024-CT abdomen/pelvis-gallbladder mass and soft tissue within the ze hepatis with enlarged portal caval node.  Soft tissue involvement of the biliary ducts and vasculature.  No evidence of metastatic disease within the chest. 5/11/2024 MR/MRCP-there is an approximately 2.2 cm obstructing mass centered at the bifurcation of the common hepatic duct with intrahepatic biliary duct dilatation, highly suspicious for cholangiocarcinoma (Klatskin tumor).  Masslike appearance of the gallbladder fundus with cystic changes measuring approximately 3.3 cm, which could represent either masslike adenoma I will mitosis versus a separate gallbladder mass.  No evidence of metastatic disease within the abdomen.  5/14/2024-EUS/ERCP 5/14/2024 (Dr. Dangelo) - proximal biliary mass without involvement of the portal vein, large ze hepatic LAD & gallbladder lesion noted - ERCP notable for hilar stricture s/p sphincterotomy, dilation of the stricture to 6 mm, brushing, biopsy, cholangioscopy & PLASTIC stent placed. *repeat in 3 months for stent removal/exchange if no sx done - cholangioscopy notable for abnormal mucosa of the proximal bile duct w/ narrowing and nodularity w/ decreased vascular pattern s/p spy bite bx ** biopsy of CBD structure c/w carcinoma, favor adeno Common bile duct stricture biopsy-small clusters of atypical cells, detached were within stroma, compatible with carcinoma and favor adenocarcinoma. Bile duct mass biopsy-minute fragments of fibrous tissue with marked crush artifact and rare, atypical cells singly or in small clusters.  5/28/2024-PET/CT scan- FDG-avid soft tissue within the ze hepatis, surrounding common bile duct stent, extending into gallbladder/gallbladder fossa, corresponds to known malignancy. FDG-avid soft tissue nodule in right parotid gland is nonspecific. Differential diagnosis includes benign and malignant salivary gland neoplasms and an intraparotid lymph node. Further evaluation may be performed with ultrasound and percutaneous needle biopsy. Indeterminate FDG-avid focus in fundus of uterus. Pelvic ultrasound/pelvic MRI may be obtained for further evaluation.  6/10/2024-Started neoadjuvant Gemcitabine/Cisplatin/Durvalumab.  Course complicated by biliary stent issues, Klebsiella bacteremia (7/1/2024).  7/2/2024-CT abdomen/pelvis-IMPRESSION: Adequate positioning of the biliary stent, however there is no  pneumobilia to suggest patency. Mild intrahepatic biliary ductal  dilatation. New from 6/4/2024 are ill-defined hypodensities scattered throughout the  bilateral hepatic lobes, suggestive of metastatic disease.  7/3/24-MRI Abdomen-IMPRESSION: 1.  Focal stricture of the common hepatic duct/common biliary duct  measuring 1.6 cm with progressive enhancement, consistent with  cholangiocarcinoma. CBD stent traverse through the focal stricture. Mild  intra and extrahepatic biliary ductal dilatation. 2.  Complex solid and cystic lesion in the gallbladder fossa with areas  of thickened septations/nodular enhancement. 3.  Multiple subcentimeter T2 hyperintense foci throughout the hepatic  parenchyma and few of which in the right hepatic lobe demonstrating  peripheral rim enhancement. Although nonspecific, these are suspicious  for microabscesses.  9/4/2024-MRI Abdomen-Resolution of right hepatic lesions, in keeping with infection. Bile duct mass, with associated common hepatic duct narrowing, without change. Slightly increased CBD dilatation with distal tapering. Unchanged intrahepatic biliary duct dilatation. Fundal gallbladder mass, slightly decreased in size. 11/6/2024-CT A/P-Fundal gallbladder mass is slightly decreased in size since 9/4/2024. CBD dilatation, unchanged. Known CBD mass is difficult to visualize on CT.  11/25/2024-Completed 8th cycle Gemcitabine/Cisplatin/Durvalumab.  12/18/2024-MRI abdomen/MRCP-. Since September 4, 2024, unchanged residual stricture at biliary ductal confluence and unchanged residual gallbladder tumor. No new suspicious finding.  1/2/2025-PET/CT scan-Compared to FDG PET/CT dated 5/28/2024: 1. FDG-avid lesion in ze hepatis adjacent to biliary stent and FDG-avid focus within the gallbladder, not well delineated on CT, are decreased in size and mildly decreased in metabolism, compatible with a partial response to interval therapy. 2. Resolution of small FDG-avid focus in right parotid region. 3. Resolution of FDG-avid focus in fundus of uterus. 4. No new lesion.  1/24/2025-Patient was scheduled for an exp lap hepatectomy & bile duct resection - however intra-operatively was noted to have positive peritoneal implants, case was aborted and biopsies taken, path: - peritoneal bx: moderately differentiated adeno - right diaphragmatic nodule: moderate to poorly differentiated adeno - peritoneal bx #2: moderately differentiated adeno PD-L1 TPS 75%, Her 2- (1+), pMMR  2/26/2025-CT C/A/P-Heterogeneity of the gallbladder fundus again noted. Multiple peritoneal implants consistent with metastatic disease. 3/3/2025-Began FOLFOX.  [de-identified] : Adenocarcinoma [de-identified] : 5/21/2024-CA 19-9=290 [de-identified] : 7/15/2024-[facility] Liquid biopsy-ZHANE, TMB=1 Muts/Mb. ARID1A and TET2 mutations.  No  diagnostic alterations for [facility] liquid CDX were detected. [de-identified] : Played pickleball last week, but not overexerting herself. Eating small meals at a time. No N/V/D. Mild feet neuropathy. No falls. No difficulty walking or dropping things.. [facility] to [facility]. No SOB. No melena, bleeding reported.  Notes thinks had transient hives with 2nd or 3rd cycle of [place]/Cis chemo-received benadryl pre-remainder of cycles with no adverse reaction.

## 2025-04-21 NOTE — HISTORY OF PRESENT ILLNESS
[de-identified] : 5/2024-Patient presented to [facility] with elevated LFTs. At that time, she also noted progressively darkening urine and pale stools for one week along with postprandial epigastric pain. 5/10/2024-Abdominal ultrasound-moderate intrahepatic and extrahepatic biliary dilatation.  Abnormal appearing gallbladder.  Abnormal soft tissue density within the distal portion of the common bile duct and at the ze hepatitis.  The constellation of findings is most concerning for possible gallbladder neoplasm with extension to the ze habitus and associated biliary obstruction.  5/11/2024-CT abdomen/pelvis-gallbladder mass and soft tissue within the ze hepatis with enlarged portal caval node.  Soft tissue involvement of the biliary ducts and vasculature.  No evidence of metastatic disease within the chest. 5/11/2024 MR/MRCP-there is an approximately 2.2 cm obstructing mass centered at the bifurcation of the common hepatic duct with intrahepatic biliary duct dilatation, highly suspicious for cholangiocarcinoma (Kl[place]kin tumor).  Masslike appearance of the gallbladder fundus with cystic changes measuring approximately 3.3 cm, which could represent either masslike adenoma I will mitosis versus a separate gallbladder mass.  No evidence of metastatic disease within the abdomen.  5/14/2024-EUS/ERCP 5/14/2024 (Dr. Dangelo) - proximal biliary mass without involvement of the portal vein, large ze hepatic LAD & gallbladder lesion noted - ERCP notable for hilar stricture s/p sphincterotomy, dilation of the stricture to 6 mm, brushing, biopsy, cholangioscopy & PLASTIC stent placed. *repeat in 3 months for stent removal/exchange if no sx done - cholangioscopy notable for abnormal mucosa of the proximal bile duct w/ narrowing and nodularity w/ decreased vascular pattern s/p spy bite bx ** biopsy of CBD structure c/w carcinoma, favor adeno Common bile duct stricture biopsy-small clusters of atypical cells, detached were within stroma, compatible with carcinoma and favor adenocarcinoma. Bile duct mass biopsy-minute fragments of fibrous tissue with marked crush artifact and rare, atypical cells singly or in small clusters.  5/28/2024-PET/CT scan- FDG-avid soft tissue within the ze hepatis, surrounding common bile duct stent, extending into gallbladder/gallbladder fossa, corresponds to known malignancy. FDG-avid soft tissue nodule in right parotid gland is nonspecific. Differential diagnosis includes benign and malignant salivary gland neoplasms and an intraparotid lymph node. Further evaluation may be performed with ultrasound and percutaneous needle biopsy. Indeterminate FDG-avid focus in fundus of uterus. Pelvic ultrasound/pelvic MRI may be obtained for further evaluation.  6/10/2024-Started neoadjuvant Gemcitabine/Cisplatin/Durvalumab.  Course complicated by biliary stent issues, Klebsiella bacteremia (7/1/2024).  7/2/2024-CT abdomen/pelvis-IMPRESSION: Adequate positioning of the biliary stent, however there is no  pneumobilia to suggest patency. Mild intrahepatic biliary ductal  dilatation. New from 6/4/2024 are ill-defined hypodensities scattered throughout the  bilateral hepatic lobes, suggestive of metastatic disease.  7/3/24-MRI Abdomen-IMPRESSION: 1.  Focal stricture of the common hepatic duct/common biliary duct  measuring 1.6 cm with progressive enhancement, consistent with  cholangiocarcinoma. CBD stent traverse through the focal stricture. Mild  intra and extrahepatic biliary ductal dilatation. 2.  Complex solid and cystic lesion in the gallbladder fossa with areas  of thickened septations/nodular enhancement. 3.  Multiple subcentimeter T2 hyperintense foci throughout the hepatic  parenchyma and few of which in the right hepatic lobe demonstrating  peripheral rim enhancement. Although nonspecific, these are suspicious  for microabscesses.  9/4/2024-MRI Abdomen-Resolution of right hepatic lesions, in keeping with infection. Bile duct mass, with associated common hepatic duct narrowing, without change. Slightly increased CBD dilatation with distal tapering. Unchanged intrahepatic biliary duct dilatation. Fundal gallbladder mass, slightly decreased in size. 11/6/2024-CT A/P-Fundal gallbladder mass is slightly decreased in size since 9/4/2024. CBD dilatation, unchanged. Known CBD mass is difficult to visualize on CT.  11/25/2024-Completed 8th cycle Gemcitabine/Cisplatin/Durvalumab.  12/18/2024-MRI abdomen/MRCP-. Since September 4, 2024, unchanged residual stricture at biliary ductal confluence and unchanged residual gallbladder tumor. No new suspicious finding.  1/2/2025-PET/CT scan-Compared to FDG PET/CT dated 5/28/2024: 1. FDG-avid lesion in ze hepatis adjacent to biliary stent and FDG-avid focus within the gallbladder, not well delineated on CT, are decreased in size and mildly decreased in metabolism, compatible with a partial response to interval therapy. 2. Resolution of small FDG-avid focus in right parotid region. 3. Resolution of FDG-avid focus in fundus of uterus. 4. No new lesion.  1/24/2025-Patient was scheduled for an exp lap hepatectomy & bile duct resection - however intra-operatively was noted to have positive peritoneal implants, case was aborted and biopsies taken, path: - peritoneal bx: moderately differentiated adeno - right diaphragmatic nodule: moderate to poorly differentiated adeno - peritoneal bx #2: moderately differentiated adeno PD-L1 TPS 75%, Her 2- (1+), pMMR  2/26/2025-CT C/A/P-Heterogeneity of the gallbladder fundus again noted. Multiple peritoneal implants consistent with metastatic disease. 3/3/2025-Began FOLFOX.  [de-identified] : Adenocarcinoma [de-identified] : 5/21/2024-CA 19-9=290 [de-identified] : 7/15/2024-[facility] Liquid biopsy-ZHANE, TMB=1 Muts/Mb. ARID1A and TET2 mutations.  No  diagnostic alterations for FoundationOne liquid CDX were detected. [de-identified] : Played pickleball last week, but not overexerting herself. Eating small meals at a time. No N/V/D. Mild feet neuropathy. No falls. No difficulty walking or dropping things.. [facility] to [facility]. No SOB. No melena, bleeding reported.  Notes thinks had transient hives with 2nd or 3rd cycle of [place]/Cis chemo-received benadryl pre-remainder of cycles with no adverse reaction.

## 2025-04-28 NOTE — HISTORY OF PRESENT ILLNESS
[Disease: _____________________] : Disease: [unfilled] [de-identified] : 5/2024-Patient presented to [facility] with elevated LFTs. At that time, she also noted progressively darkening urine and pale stools for one week along with postprandial epigastric pain. 5/10/2024-Abdominal ultrasound-moderate intrahepatic and extrahepatic biliary dilatation.  Abnormal appearing gallbladder.  Abnormal soft tissue density within the distal portion of the common bile duct and at the ze hepatitis.  The constellation of findings is most concerning for possible gallbladder neoplasm with extension to the ze habitus and associated biliary obstruction.  5/11/2024-CT abdomen/pelvis-gallbladder mass and soft tissue within the ze hepatis with enlarged portal caval node.  Soft tissue involvement of the biliary ducts and vasculature.  No evidence of metastatic disease within the chest. 5/11/2024 MR/MRCP-there is an approximately 2.2 cm obstructing mass centered at the bifurcation of the common hepatic duct with intrahepatic biliary duct dilatation, highly suspicious for cholangiocarcinoma ([place] tumor).  Masslike appearance of the gallbladder fundus with cystic changes measuring approximately 3.3 cm, which could represent either masslike adenoma I will mitosis versus a separate gallbladder mass.  No evidence of metastatic disease within the abdomen.  5/14/2024-EUS/ERCP 5/14/2024 (Dr. Dangelo) - proximal biliary mass without involvement of the portal vein, large ze hepatic LAD & gallbladder lesion noted - ERCP notable for hilar stricture s/p sphincterotomy, dilation of the stricture to 6 mm, brushing, biopsy, cholangioscopy & PLASTIC stent placed. *repeat in 3 months for stent removal/exchange if no sx done - cholangioscopy notable for abnormal mucosa of the proximal bile duct w/ narrowing and nodularity w/ decreased vascular pattern s/p spy bite bx ** biopsy of CBD structure c/w carcinoma, favor adeno Common bile duct stricture biopsy-small clusters of atypical cells, detached were within stroma, compatible with carcinoma and favor adenocarcinoma. Bile duct mass biopsy-minute fragments of fibrous tissue with marked crush artifact and rare, atypical cells singly or in small clusters.  5/28/2024-PET/CT scan- FDG-avid soft tissue within the ze hepatis, surrounding common bile duct stent, extending into gallbladder/gallbladder fossa, corresponds to known malignancy. FDG-avid soft tissue nodule in right parotid gland is nonspecific. Differential diagnosis includes benign and malignant salivary gland neoplasms and an intraparotid lymph node. Further evaluation may be performed with ultrasound and percutaneous needle biopsy. Indeterminate FDG-avid focus in fundus of uterus. Pelvic ultrasound/pelvic MRI may be obtained for further evaluation.  6/10/2024-Started neoadjuvant Gemcitabine/Cisplatin/Durvalumab.  Course complicated by biliary stent issues, Klebsiella bacteremia (7/1/2024).  7/2/2024-CT abdomen/pelvis-IMPRESSION: Adequate positioning of the biliary stent, however there is no  pneumobilia to suggest patency. Mild intrahepatic biliary ductal  dilatation. New from 6/4/2024 are ill-defined hypodensities scattered throughout the  bilateral hepatic lobes, suggestive of metastatic disease.  7/3/24-MRI Abdomen-IMPRESSION: 1.  Focal stricture of the common hepatic duct/common biliary duct  measuring 1.6 cm with progressive enhancement, consistent with  cholangiocarcinoma. CBD stent traverse through the focal stricture. Mild  intra and extrahepatic biliary ductal dilatation. 2.  Complex solid and cystic lesion in the gallbladder fossa with areas  of thickened septations/nodular enhancement. 3.  Multiple subcentimeter T2 hyperintense foci throughout the hepatic  parenchyma and few of which in the right hepatic lobe demonstrating  peripheral rim enhancement. Although nonspecific, these are suspicious  for microabscesses.  9/4/2024-MRI Abdomen-Resolution of right hepatic lesions, in keeping with infection. Bile duct mass, with associated common hepatic duct narrowing, without change. Slightly increased CBD dilatation with distal tapering. Unchanged intrahepatic biliary duct dilatation. Fundal gallbladder mass, slightly decreased in size. 11/6/2024-CT A/P-Fundal gallbladder mass is slightly decreased in size since 9/4/2024. CBD dilatation, unchanged. Known CBD mass is difficult to visualize on CT.  11/25/2024-Completed 8th cycle Gemcitabine/Cisplatin/Durvalumab.  12/18/2024-MRI abdomen/MRCP-. Since September 4, 2024, unchanged residual stricture at biliary ductal confluence and unchanged residual gallbladder tumor. No new suspicious finding.  1/2/2025-PET/CT scan-Compared to FDG PET/CT dated 5/28/2024: 1. FDG-avid lesion in ze hepatis adjacent to biliary stent and FDG-avid focus within the gallbladder, not well delineated on CT, are decreased in size and mildly decreased in metabolism, compatible with a partial response to interval therapy. 2. Resolution of small FDG-avid focus in right parotid region. 3. Resolution of FDG-avid focus in fundus of uterus. 4. No new lesion.  1/24/2025-Patient was scheduled for an exp lap hepatectomy & bile duct resection - however intra-operatively was noted to have positive peritoneal implants, case was aborted and biopsies taken, path: - peritoneal bx: moderately differentiated adeno - right diaphragmatic nodule: moderate to poorly differentiated adeno - peritoneal bx #2: moderately differentiated adeno PD-L1 TPS 75%, Her 2- (1+), pMMR  2/26/2025-CT C/A/P-Heterogeneity of the gallbladder fundus again noted. Multiple peritoneal implants consistent with metastatic disease. 3/3/2025-Began FOLFOX.  [de-identified] : Adenocarcinoma [de-identified] : 5/21/2024-CA 19-9=290 [de-identified] : 7/15/2024-[facility] Liquid biopsy-ZHANE, TMB=1 Muts/Mb. ARID1A and TET2 mutations.  No  diagnostic alterations for [employer] liquid CDX were detected. [de-identified] : Mild lower back pain after sitting for a long time at dinner-improved with 'claritin'. S/p follow up with alexa. BDT done 1/2025 by Gyn (Dr. Rankin)-osteoporosis. She's considering fosamax.  Mild feet neuropathy, improving with massaging. No falls. No difficulty walking or dropping things. Goes to senior Center. No dysuria, SOB, n/v/d/c. No melena, bleeding reported.  Notes thinks had transient hives with 2nd or 3rd cycle of [place]/Cis chemo-received benadryl pre-remainder of cycles with no adverse reaction.

## 2025-04-28 NOTE — ASSESSMENT
[FreeTextEntry1] : Lab result reviewed, 4/21/25 Carlos note reviewed. #1) GB carcinoma-T4N1(Stage IIIC) clinically. 5/28/2024-PET/CT scan-. FDG-avid soft tissue within the ze hepatis, surrounding common bile duct stent, extending into gallbladder/gallbladder fossa, corresponds to known malignancy. FDG-avid soft tissue nodule in right parotid gland is nonspecific. Differential diagnosis includes benign and malignant salivary gland neoplasms and an intraparotid lymph node. Further evaluation may be performed with ultrasound and percutaneous needle biopsy. Indeterminate FDG-avid focus in fundus of uterus. Pelvic ultrasound/pelvic MRI may be obtained for further evaluation. -**Requested [facility] One, PD-L1 on pathology-insufficient tissue. 7/15/2024-[facility] Liquid biopsy-ZHANE, TMB=1 Muts/Mb. ARID1A and TET2 mutations.  No  diagnostic alterations for [employer] liquid CDX were detected. --had reviewed roles of surgery/radiation/systemic therapy in biliary tract cancers-requires a multidisciplinary approach.  5/30/2024-Had discussed case with surgeon Dr. Allison.  He recommended neoadjuvant systemic therapy with interval follow-up imaging at 2 months, and pending this, again at 4 months for surgical decisions.  6/10/2024-Started neoadjuvant Gemcitabine/Cisplatin/Durvalumab.  Course complicated by biliary stent issues, Klebsiella bacteremia. Following treatment of infection, resumed treatment.  With creatinine improved, resumed cisplatin.  Needed to further elucidate liver lesions: 7/2/2024-CT abdomen/pelvis-IMPRESSION: Adequate positioning of the biliary stent, however there is no  pneumobilia to suggest patency. Mild intrahepatic biliary ductal  dilatation. New from 6/4/2024 are ill-defined hypodensities scattered throughout the  bilateral hepatic lobes, suggestive of metastatic disease.  7/3/24-MRI Abdomen-IMPRESSION: 1.  Focal stricture of the common hepatic duct/common biliary duct  measuring 1.6 cm with progressive enhancement, consistent with  cholangiocarcinoma. CBD stent traverse through the focal stricture. Mild  intra and extrahepatic biliary ductal dilatation. 2.  Complex solid and cystic lesion in the gallbladder fossa with areas  of thickened septations/nodular enhancement. 3.  Multiple subcentimeter T2 hyperintense foci throughout the hepatic  parenchyma and few of which in the right hepatic lobe demonstrating  peripheral rim enhancement. Although nonspecific, these are suspicious  for microabscesses.  --?Micro abscesses vs. mets 7/18/2024-MRI Abdomen-1.  Several small right hepatic lobe liver lesions are without significant change from 7/3/2024. While favored to be inflammatory from prior cholangitis (improvement on imaging can lag behind clinical improvement), metastatic disease remains possible. Follow-up MRI abdomen recommended in 6-8 weeks. 2.  Bile duct mass and gallbladder mass are without significant change. 3.  Iron deposition within the liver. 9/4/2024-MRI Abdomen-Resolution of right hepatic lesions, in keeping with infection. Bile duct mass, with associated common hepatic duct narrowing, without change. Slightly increased CBD dilatation with distal tapering. Unchanged intrahepatic biliary duct dilatation. Fundal gallbladder mass, slightly decreased in size. 11/6/2024-CT A/P-Fundal gallbladder mass is slightly decreased in size since 9/4/2024. CBD dilatation, unchanged. Known CBD mass is difficult to visualize on CT.  11/25/2024-Completed 8th cycle Gemcitabine/Cisplatin/Durvalumab.  12/18/2024-MRI abdomen/MRCP-Since September 4, 2024, unchanged residual stricture at biliary ductal confluence and unchanged residual gallbladder tumor. No new suspicious finding. 1/2/2025-PET/CT scan-Compared to FDG PET/CT dated 5/28/2024: 1. FDG-avid lesion in ze hepatis adjacent to biliary stent and FDG-avid focus within the gallbladder, not well delineated on CT, are decreased in size and mildly decreased in metabolism, compatible with a partial response to interval therapy. 2. Resolution of small FDG-avid focus in right parotid region. 3. Resolution of FDG-avid focus in fundus of uterus. 4. No new lesion.  **1/24/2025-Patient was scheduled for an exp lap hepatectomy & bile duct resection - however intra-operatively was noted to have positive peritoneal implants, case was aborted and biopsies taken, path: - peritoneal bx: moderately differentiated adenocarcinoma - right diaphragmatic nodule: moderate to poorly differentiated adenocarcinoma - peritoneal bx #2: moderately differentiated adenocarcinoma PD-L1 TPS 75%, Her 2- (1+), pMMR; Foundation testing pending per path report.  2/26/2025-CT C/A/P-Heterogeneity of the gallbladder fundus again noted. Multiple peritoneal implants consistent with metastatic disease. As patient now with measurable progression of disease on imaging, recommended change in systemic therapy-to consider FOLFOX regimen.   3/3/3035-Began FOLFOX. Note, received message from GI/hepatobiliary research team at [facility] 2/26/2025-they do not have any trial options for this patient at this time. --clinically stable though neutropenia and thrombocytopenia d/w patient-holding chemo today with which she agrees. Zarxio injection x3 days with first injection given today. --continue to monitor LFTs. 4/11/2025 Abdominal US-Known gallbladder malignancy with heterogeneous and ill-defined soft tissue extending from gallbladder into the adjacent liver. --will give benadryl pre-oxaliplatin as did with cisplatin post an episode of hives with one of the earlier treatments with [place]-Cis (remainder of cycles without adverse reaction)-have discussed with patient/-they concurred with plans. Treatment RN to monitor closely for s/sx. reaction as per protocol.  #2) FDG-avid soft tissue nodule in right parotid gland nonspecific on PET/CT scan 5/25/2024.  F/U right parotid US 7/30/2024-The right parotid gland is normal in size and echogenicity. Again noted is 8 x 7 x 5 mm heterogeneous predominantly isoechoic nodularity in the superficial midportion of the parotid gland, unchanged. Findings are nonspecific. Consider follow up ultrasound in 6 months. 9/2024-Saw ENT MD Dr. Cid. 9/30/2024-SALIVARY GLAND, PAROTID, RIGHT INFERIOR, US GUIDED FNA NON DIAGNOSTIC. Benign salivary gland tissue only Resolution of small FDG-avid focus in right parotid region on PET/CT scan 12/24/2024. ---continue f/u with ENT MD-plan to repeat MRI in 6-months.   #3) indeterminate FDG-avid focus in fundus of uterus on PET/CT scan 5/25/2024. Pelvic US 7/30/2024-Leiomyomatous uterus. Subcentimeter bilateral simple ovarian cyst. Trace fundal endometrial fluid. Resolution of FDG-avid focus in fundus of uterus on 12/24/2024 PET/CT scan. --has seen GYN MD 1/2025-yearly f/u as planned --BDT, [id number] to take fosamax. She will follow up with PCP for this.   #4) mild neuropathy symptoms-to try gabapentin-potential side effect profile reviewed; foot massage  Patient was given the opportunity to ask questions. Her questions have been answered to her apparent satisfaction at this time. She expressed her understanding and willingness to comply with recommended f/u.  -->FOLFOX: 5-Fluorouracil 400 mg/m2 IV bolus, then 2400 mg/m2 CIV/48 hours;  mg/m2 IV and Oxaliplatin 85 mg/m2 IV. Cycle every 14 days. Neulasta Onpro support. HOLD Cycle #4 today d/t neutropenia and thrombocytopenia. Zarxio injection x3. RTO 1 week.

## 2025-04-28 NOTE — REVIEW OF SYSTEMS
[Diarrhea: Grade 0] : Diarrhea: Grade 0 [Negative] : Allergic/Immunologic [Fever] : no fever [Dysuria] : no dysuria [FreeTextEntry9] : back pain

## 2025-04-29 NOTE — HISTORY OF PRESENT ILLNESS
[Disease: _____________________] : Disease: [unfilled] [de-identified] : 5/2024-Patient presented to [facility] with elevated LFTs. At that time, she also noted progressively darkening urine and pale stools for one week along with postprandial epigastric pain. 5/10/2024-Abdominal ultrasound-moderate intrahepatic and extrahepatic biliary dilatation.  Abnormal appearing gallbladder.  Abnormal soft tissue density within the distal portion of the common bile duct and at the ze hepatitis.  The constellation of findings is most concerning for possible gallbladder neoplasm with extension to the ze habitus and associated biliary obstruction.  5/11/2024-CT abdomen/pelvis-gallbladder mass and soft tissue within the ze hepatis with enlarged portal caval node.  Soft tissue involvement of the biliary ducts and vasculature.  No evidence of metastatic disease within the chest. 5/11/2024 MR/MRCP-there is an approximately 2.2 cm obstructing mass centered at the bifurcation of the common hepatic duct with intrahepatic biliary duct dilatation, highly suspicious for cholangiocarcinoma ([place] tumor).  Masslike appearance of the gallbladder fundus with cystic changes measuring approximately 3.3 cm, which could represent either masslike adenoma I will mitosis versus a separate gallbladder mass.  No evidence of metastatic disease within the abdomen.  5/14/2024-EUS/ERCP 5/14/2024 (Dr. Dangelo) - proximal biliary mass without involvement of the portal vein, large ze hepatic LAD & gallbladder lesion noted - ERCP notable for hilar stricture s/p sphincterotomy, dilation of the stricture to 6 mm, brushing, biopsy, cholangioscopy & PLASTIC stent placed. *repeat in 3 months for stent removal/exchange if no sx done - cholangioscopy notable for abnormal mucosa of the proximal bile duct w/ narrowing and nodularity w/ decreased vascular pattern s/p spy bite bx ** biopsy of CBD structure c/w carcinoma, favor adeno Common bile duct stricture biopsy-small clusters of atypical cells, detached were within stroma, compatible with carcinoma and favor adenocarcinoma. Bile duct mass biopsy-minute fragments of fibrous tissue with marked crush artifact and rare, atypical cells singly or in small clusters.  5/28/2024-PET/CT scan- FDG-avid soft tissue within the ze hepatis, surrounding common bile duct stent, extending into gallbladder/gallbladder fossa, corresponds to known malignancy. FDG-avid soft tissue nodule in right parotid gland is nonspecific. Differential diagnosis includes benign and malignant salivary gland neoplasms and an intraparotid lymph node. Further evaluation may be performed with ultrasound and percutaneous needle biopsy. Indeterminate FDG-avid focus in fundus of uterus. Pelvic ultrasound/pelvic MRI may be obtained for further evaluation.  6/10/2024-Started neoadjuvant Gemcitabine/Cisplatin/Durvalumab.  Course complicated by biliary stent issues, Klebsiella bacteremia (7/1/2024).  7/2/2024-CT abdomen/pelvis-IMPRESSION: Adequate positioning of the biliary stent, however there is no  pneumobilia to suggest patency. Mild intrahepatic biliary ductal  dilatation. New from 6/4/2024 are ill-defined hypodensities scattered throughout the  bilateral hepatic lobes, suggestive of metastatic disease.  7/3/24-MRI Abdomen-IMPRESSION: 1.  Focal stricture of the common hepatic duct/common biliary duct  measuring 1.6 cm with progressive enhancement, consistent with  cholangiocarcinoma. CBD stent traverse through the focal stricture. Mild  intra and extrahepatic biliary ductal dilatation. 2.  Complex solid and cystic lesion in the gallbladder fossa with areas  of thickened septations/nodular enhancement. 3.  Multiple subcentimeter T2 hyperintense foci throughout the hepatic  parenchyma and few of which in the right hepatic lobe demonstrating  peripheral rim enhancement. Although nonspecific, these are suspicious  for microabscesses.  9/4/2024-MRI Abdomen-Resolution of right hepatic lesions, in keeping with infection. Bile duct mass, with associated common hepatic duct narrowing, without change. Slightly increased CBD dilatation with distal tapering. Unchanged intrahepatic biliary duct dilatation. Fundal gallbladder mass, slightly decreased in size. 11/6/2024-CT A/P-Fundal gallbladder mass is slightly decreased in size since 9/4/2024. CBD dilatation, unchanged. Known CBD mass is difficult to visualize on CT.  11/25/2024-Completed 8th cycle Gemcitabine/Cisplatin/Durvalumab.  12/18/2024-MRI abdomen/MRCP-. Since September 4, 2024, unchanged residual stricture at biliary ductal confluence and unchanged residual gallbladder tumor. No new suspicious finding.  1/2/2025-PET/CT scan-Compared to FDG PET/CT dated 5/28/2024: 1. FDG-avid lesion in ze hepatis adjacent to biliary stent and FDG-avid focus within the gallbladder, not well delineated on CT, are decreased in size and mildly decreased in metabolism, compatible with a partial response to interval therapy. 2. Resolution of small FDG-avid focus in right parotid region. 3. Resolution of FDG-avid focus in fundus of uterus. 4. No new lesion.  1/24/2025-Patient was scheduled for an exp lap hepatectomy & bile duct resection - however intra-operatively was noted to have positive peritoneal implants, case was aborted and biopsies taken, path: - peritoneal bx: moderately differentiated adeno - right diaphragmatic nodule: moderate to poorly differentiated adeno - peritoneal bx #2: moderately differentiated adeno PD-L1 TPS 75%, Her 2- (1+), pMMR  2/26/2025-CT C/A/P-Heterogeneity of the gallbladder fundus again noted. Multiple peritoneal implants consistent with metastatic disease. 3/3/2025-Began FOLFOX.  [de-identified] : Adenocarcinoma [de-identified] : 5/21/2024-CA 19-9=290 [de-identified] : 7/15/2024-[facility] Liquid biopsy-ZHANE, TMB=1 Muts/Mb. ARID1A and TET2 mutations.  No  diagnostic alterations for [employer] liquid CDX were detected. [de-identified] :   --Played pickleball last week, but not overexerting herself. Eating small meals at a time. No N/V/D. Mild feet neuropathy. No falls. No difficulty walking or dropping things.. Goes to senior Center. No SOB. No melena, bleeding reported.  Notes thinks had transient hives with 2nd or 3rd cycle of [place]/Cis chemo-received benadryl pre-remainder of cycles with no adverse reaction.

## 2025-04-29 NOTE — ASSESSMENT
[FreeTextEntry1] : Lab work reviewed. #1) GB carcinoma-T4N1(Stage IIIC) clinically. 5/28/2024-PET/CT scan-. FDG-avid soft tissue within the ze hepatis, surrounding common bile duct stent, extending into gallbladder/gallbladder fossa, corresponds to known malignancy. FDG-avid soft tissue nodule in right parotid gland is nonspecific. Differential diagnosis includes benign and malignant salivary gland neoplasms and an intraparotid lymph node. Further evaluation may be performed with ultrasound and percutaneous needle biopsy. Indeterminate FDG-avid focus in fundus of uterus. Pelvic ultrasound/pelvic MRI may be obtained for further evaluation. -**Requested [facility], PD-L1 on pathology-insufficient tissue. 7/15/2024-[facility] Liquid biopsy-ZHANE, TMB=1 Muts/Mb. ARID1A and TET2 mutations.  No  diagnostic alterations for [facility] liquid CDX were detected. --had reviewed roles of surgery/radiation/systemic therapy in biliary tract cancers-requires a multidisciplinary approach.  5/30/2024-Had discussed case with surgeon Dr. Allison.  He recommended neoadjuvant systemic therapy with interval follow-up imaging at 2 months, and pending this, again at 4 months for surgical decisions.  6/10/2024-Started neoadjuvant Gemcitabine/Cisplatin/Durvalumab.  Course complicated by biliary stent issues, Klebsiella bacteremia. Following treatment of infection, resumed treatment.  With creatinine improved, resumed cisplatin.  Needed to further elucidate liver lesions: 7/2/2024-CT abdomen/pelvis-IMPRESSION: Adequate positioning of the biliary stent, however there is no  pneumobilia to suggest patency. Mild intrahepatic biliary ductal  dilatation. New from 6/4/2024 are ill-defined hypodensities scattered throughout the  bilateral hepatic lobes, suggestive of metastatic disease.  7/3/24-MRI Abdomen-IMPRESSION: 1.  Focal stricture of the common hepatic duct/common biliary duct  measuring 1.6 cm with progressive enhancement, consistent with  cholangiocarcinoma. CBD stent traverse through the focal stricture. Mild  intra and extrahepatic biliary ductal dilatation. 2.  Complex solid and cystic lesion in the gallbladder fossa with areas  of thickened septations/nodular enhancement. 3.  Multiple subcentimeter T2 hyperintense foci throughout the hepatic  parenchyma and few of which in the right hepatic lobe demonstrating  peripheral rim enhancement. Although nonspecific, these are suspicious  for microabscesses.  --?Micro abscesses vs. mets 7/18/2024-MRI Abdomen-1.  Several small right hepatic lobe liver lesions are without significant change from 7/3/2024. While favored to be inflammatory from prior cholangitis (improvement on imaging can lag behind clinical improvement), metastatic disease remains possible. Follow-up MRI abdomen recommended in 6-8 weeks. 2.  Bile duct mass and gallbladder mass are without significant change. 3.  Iron deposition within the liver. 9/4/2024-MRI Abdomen-Resolution of right hepatic lesions, in keeping with infection. Bile duct mass, with associated common hepatic duct narrowing, without change. Slightly increased CBD dilatation with distal tapering. Unchanged intrahepatic biliary duct dilatation. Fundal gallbladder mass, slightly decreased in size. 11/6/2024-CT A/P-Fundal gallbladder mass is slightly decreased in size since 9/4/2024. CBD dilatation, unchanged. Known CBD mass is difficult to visualize on CT.  11/25/2024-Completed 8th cycle Gemcitabine/Cisplatin/Durvalumab.  12/18/2024-MRI abdomen/MRCP-Since September 4, 2024, unchanged residual stricture at biliary ductal confluence and unchanged residual gallbladder tumor. No new suspicious finding. 1/2/2025-PET/CT scan-Compared to FDG PET/CT dated 5/28/2024: 1. FDG-avid lesion in ze hepatis adjacent to biliary stent and FDG-avid focus within the gallbladder, not well delineated on CT, are decreased in size and mildly decreased in metabolism, compatible with a partial response to interval therapy. 2. Resolution of small FDG-avid focus in right parotid region. 3. Resolution of FDG-avid focus in fundus of uterus. 4. No new lesion.  **1/24/2025-Patient was scheduled for an exp lap hepatectomy & bile duct resection - however intra-operatively was noted to have positive peritoneal implants, case was aborted and biopsies taken, path: - peritoneal bx: moderately differentiated adenocarcinoma - right diaphragmatic nodule: moderate to poorly differentiated adenocarcinoma - peritoneal bx #2: moderately differentiated adenocarcinoma PD-L1 TPS 75%, Her 2- (1+), pMMR; Foundation testing pending per path report.  2/26/2025-CT C/A/P-Heterogeneity of the gallbladder fundus again noted. Multiple peritoneal implants consistent with metastatic disease. As patient now with measurable progression of disease on imaging, recommended change in systemic therapy-to consider FOLFOX regimen.   3/3/3035-Began FOLFOX. Note, received message from GI/hepatobiliary research team at [facility] 2/26/2025-they do not have any trial options for this patient at this time. --clinically stable for treatment today. Have had to hold chemo x 2 weeks due to cytopenias (thrombocytopenia+/- neutropenia)-will now decrease chemo dosage by ~20%-assess tolerance/response --continue to monitor LFTs. 4/11/2025 Abdominal US-Known gallbladder malignancy with heterogeneous and ill-defined soft tissue extending from gallbladder into the adjacent liver. --will give benadryl pre-oxaliplatin as did with cisplatin post an episode of hives with one of the earlier treatments with [place]-Cis (remainder of cycles without adverse reaction)-have discussed with patient/-they have concurred with plans. Treatment RN to monitor closely for s/sx. reaction as per protocol.  #2) FDG-avid soft tissue nodule in right parotid gland nonspecific on PET/CT scan 5/25/2024.  F/U right parotid US 7/30/2024-The right parotid gland is normal in size and echogenicity. Again noted is 8 x 7 x 5 mm heterogeneous predominantly isoechoic nodularity in the superficial midportion of the parotid gland, unchanged. Findings are nonspecific. Consider follow up ultrasound in 6 months. 9/2024-Saw ENT MD Dr. Cid. 9/30/2024-SALIVARY GLAND, PAROTID, RIGHT INFERIOR, US GUIDED FNA NON DIAGNOSTIC. Benign salivary gland tissue only Resolution of small FDG-avid focus in right parotid region on PET/CT scan 12/24/2024. ---continue f/u with ENT MD   #3) indeterminate FDG-avid focus in fundus of uterus on PET/CT scan 5/25/2024. Pelvic US 7/30/2024-Leiomyomatous uterus. Subcentimeter bilateral simple ovarian cyst. Trace fundal endometrial fluid. Resolution of FDG-avid focus in fundus of uterus on 12/24/2024 PET/CT scan. --has seen GYN MD 1/2024-yearly f/u as planned  #4) mild neuropathy symptoms-gabapentin; foot massage  Patient was given the opportunity to ask questions. Her questions have been answered to her apparent satisfaction at this time. She expressed her understanding and willingness to comply with recommended f/u.  -->FOLFOX-doses now decreased by ~20%. Cycle every 14 days. Neulasta Onpro support. Cycle #4 today secondary to thrombocytopenia. RTO 1 week.

## 2025-04-29 NOTE — REVIEW OF SYSTEMS
[Fever] : no fever [Diarrhea: Grade 0] : Diarrhea: Grade 0 [Negative] : Allergic/Immunologic [FreeTextEntry9] : back pain

## 2025-05-05 NOTE — ASSESSMENT
[FreeTextEntry1] : Lab work reviewed. #1) GB carcinoma-T4N1(Stage IIIC) clinically. 5/28/2024-PET/CT scan-. FDG-avid soft tissue within the ze hepatis, surrounding common bile duct stent, extending into gallbladder/gallbladder fossa, corresponds to known malignancy. FDG-avid soft tissue nodule in right parotid gland is nonspecific. Differential diagnosis includes benign and malignant salivary gland neoplasms and an intraparotid lymph node. Further evaluation may be performed with ultrasound and percutaneous needle biopsy. Indeterminate FDG-avid focus in fundus of uterus. Pelvic ultrasound/pelvic MRI may be obtained for further evaluation. -**Requested [facility], PD-L1 on pathology-insufficient tissue. 7/15/2024-[facility] Liquid biopsy-ZHANE, TMB=1 Muts/Mb. ARID1A and TET2 mutations.  No  diagnostic alterations for Foundation[facility] liquid CDX were detected. --had reviewed roles of surgery/radiation/systemic therapy in biliary tract cancers-requires a multidisciplinary approach.  5/30/2024-Had discussed case with surgeon Dr. Allison.  He recommended neoadjuvant systemic therapy with interval follow-up imaging at 2 months, and pending this, again at 4 months for surgical decisions.  6/10/2024-Started neoadjuvant Gemcitabine/Cisplatin/Durvalumab.  Course complicated by biliary stent issues, Klebsiella bacteremia. Following treatment of infection, resumed treatment.  With creatinine improved, resumed cisplatin.  Needed to further elucidate liver lesions: 7/2/2024-CT abdomen/pelvis-IMPRESSION: Adequate positioning of the biliary stent, however there is no  pneumobilia to suggest patency. Mild intrahepatic biliary ductal  dilatation. New from 6/4/2024 are ill-defined hypodensities scattered throughout the  bilateral hepatic lobes, suggestive of metastatic disease.  7/3/24-MRI Abdomen-IMPRESSION: 1.  Focal stricture of the common hepatic duct/common biliary duct  measuring 1.6 cm with progressive enhancement, consistent with  cholangiocarcinoma. CBD stent traverse through the focal stricture. Mild  intra and extrahepatic biliary ductal dilatation. 2.  Complex solid and cystic lesion in the gallbladder fossa with areas  of thickened septations/nodular enhancement. 3.  Multiple subcentimeter T2 hyperintense foci throughout the hepatic  parenchyma and few of which in the right hepatic lobe demonstrating  peripheral rim enhancement. Although nonspecific, these are suspicious  for microabscesses.  --?Micro abscesses vs. mets 7/18/2024-MRI Abdomen-1.  Several small right hepatic lobe liver lesions are without significant change from 7/3/2024. While favored to be inflammatory from prior cholangitis (improvement on imaging can lag behind clinical improvement), metastatic disease remains possible. Follow-up MRI abdomen recommended in 6-8 weeks. 2.  Bile duct mass and gallbladder mass are without significant change. 3.  Iron deposition within the liver. 9/4/2024-MRI Abdomen-Resolution of right hepatic lesions, in keeping with infection. Bile duct mass, with associated common hepatic duct narrowing, without change. Slightly increased CBD dilatation with distal tapering. Unchanged intrahepatic biliary duct dilatation. Fundal gallbladder mass, slightly decreased in size. 11/6/2024-CT A/P-Fundal gallbladder mass is slightly decreased in size since 9/4/2024. CBD dilatation, unchanged. Known CBD mass is difficult to visualize on CT.  11/25/2024-Completed 8th cycle Gemcitabine/Cisplatin/Durvalumab.  12/18/2024-MRI abdomen/MRCP-Since September 4, 2024, unchanged residual stricture at biliary ductal confluence and unchanged residual gallbladder tumor. No new suspicious finding. 1/2/2025-PET/CT scan-Compared to FDG PET/CT dated 5/28/2024: 1. FDG-avid lesion in ze hepatis adjacent to biliary stent and FDG-avid focus within the gallbladder, not well delineated on CT, are decreased in size and mildly decreased in metabolism, compatible with a partial response to interval therapy. 2. Resolution of small FDG-avid focus in right parotid region. 3. Resolution of FDG-avid focus in fundus of uterus. 4. No new lesion.  **1/24/2025-Patient was scheduled for an exp lap hepatectomy & bile duct resection - however intra-operatively was noted to have positive peritoneal implants, case was aborted and biopsies taken, path: - peritoneal bx: moderately differentiated adenocarcinoma - right diaphragmatic nodule: moderate to poorly differentiated adenocarcinoma - peritoneal bx #2: moderately differentiated adenocarcinoma PD-L1 TPS 75%, Her 2- (1+), pMMR; Foundation testing pending per path report.  2/26/2025-CT C/A/P-Heterogeneity of the gallbladder fundus again noted. Multiple peritoneal implants consistent with metastatic disease. As patient now with measurable progression of disease on imaging, recommended change in systemic therapy-to consider FOLFOX regimen.   3/3/3035-Began FOLFOX. Note, received message from GI/hepatobiliary research team at [facility] 2/26/2025-they do not have any trial options for this patient at this time. --clinically stable for treatment today if CBC adequate. Have had to hold chemo x 2 weeks due to cytopenias (thrombocytopenia+/- neutropenia)-will now decrease chemo dosage by ~20%-assess tolerance/response-patient/ advised. --continue to monitor LFTs. 4/11/2025 Abdominal US-Known gallbladder malignancy with heterogeneous and ill-defined soft tissue extending from gallbladder into the adjacent liver. Will obtain f/u US now in light of RUQ discomfort. May need t/c palliative RT if felt tumor related rather than stent related. --will give benadryl pre-oxaliplatin as did with cisplatin post an episode of hives with one of the earlier treatments with [place]-Cis (remainder of cycles without adverse reaction)-have discussed with patient/-they have concurred with plans. Treatment RN to monitor closely for s/sx. reaction as per protocol.  #2) FDG-avid soft tissue nodule in right parotid gland nonspecific on PET/CT scan 5/25/2024.  F/U right parotid US 7/30/2024-The right parotid gland is normal in size and echogenicity. Again noted is 8 x 7 x 5 mm heterogeneous predominantly isoechoic nodularity in the superficial midportion of the parotid gland, unchanged. Findings are nonspecific. Consider follow up ultrasound in 6 months. 9/2024-Saw ENT MD Dr. Cid. 9/30/2024-SALIVARY GLAND, PAROTID, RIGHT INFERIOR, US GUIDED FNA NON DIAGNOSTIC. Benign salivary gland tissue only Resolution of small FDG-avid focus in right parotid region on PET/CT scan 12/24/2024. ---continue f/u with ENT MD   #3) indeterminate FDG-avid focus in fundus of uterus on PET/CT scan 5/25/2024. Pelvic US 7/30/2024-Leiomyomatous uterus. Subcentimeter bilateral simple ovarian cyst. Trace fundal endometrial fluid. Resolution of FDG-avid focus in fundus of uterus on 12/24/2024 PET/CT scan. --has seen GYN MD 1/2024-yearly f/u as planned  #4) mild neuropathy symptoms-PRN gabapentin; foot massage  Patient was given the opportunity to ask questions. Her questions have been answered to her apparent satisfaction at this time. She expressed her understanding and willingness to comply with recommended f/u.  -->FOLFOX-doses now decreased by ~20%. Cycle every 14 days. Neulasta Onpro support. Cycle #4 today if CBC adequate. RTO 1 week.

## 2025-05-05 NOTE — ASSESSMENT
[FreeTextEntry1] : Lab work reviewed. #1) GB carcinoma-T4N1(Stage IIIC) clinically. 5/28/2024-PET/CT scan-. FDG-avid soft tissue within the ze hepatis, surrounding common bile duct stent, extending into gallbladder/gallbladder fossa, corresponds to known malignancy. FDG-avid soft tissue nodule in right parotid gland is nonspecific. Differential diagnosis includes benign and malignant salivary gland neoplasms and an intraparotid lymph node. Further evaluation may be performed with ultrasound and percutaneous needle biopsy. Indeterminate FDG-avid focus in fundus of uterus. Pelvic ultrasound/pelvic MRI may be obtained for further evaluation. -**Requested [facility] One, PD-L1 on pathology-insufficient tissue. 7/15/2024-[facility] Liquid biopsy-ZHANE, TMB=1 Muts/Mb. ARID1A and TET2 mutations.  No  diagnostic alterations for [facility] liquid CDX were detected. --had reviewed roles of surgery/radiation/systemic therapy in biliary tract cancers-requires a multidisciplinary approach.  5/30/2024-Had discussed case with surgeon Dr. Allison.  He recommended neoadjuvant systemic therapy with interval follow-up imaging at 2 months, and pending this, again at 4 months for surgical decisions.  6/10/2024-Started neoadjuvant Gemcitabine/Cisplatin/Durvalumab.  Course complicated by biliary stent issues, Klebsiella bacteremia. Following treatment of infection, resumed treatment.  With creatinine improved, resumed cisplatin.  Needed to further elucidate liver lesions: 7/2/2024-CT abdomen/pelvis-IMPRESSION: Adequate positioning of the biliary stent, however there is no  pneumobilia to suggest patency. Mild intrahepatic biliary ductal  dilatation. New from 6/4/2024 are ill-defined hypodensities scattered throughout the  bilateral hepatic lobes, suggestive of metastatic disease.  7/3/24-MRI Abdomen-IMPRESSION: 1.  Focal stricture of the common hepatic duct/common biliary duct  measuring 1.6 cm with progressive enhancement, consistent with  cholangiocarcinoma. CBD stent traverse through the focal stricture. Mild  intra and extrahepatic biliary ductal dilatation. 2.  Complex solid and cystic lesion in the gallbladder fossa with areas  of thickened septations/nodular enhancement. 3.  Multiple subcentimeter T2 hyperintense foci throughout the hepatic  parenchyma and few of which in the right hepatic lobe demonstrating  peripheral rim enhancement. Although nonspecific, these are suspicious  for microabscesses.  --?Micro abscesses vs. mets 7/18/2024-MRI Abdomen-1.  Several small right hepatic lobe liver lesions are without significant change from 7/3/2024. While favored to be inflammatory from prior cholangitis (improvement on imaging can lag behind clinical improvement), metastatic disease remains possible. Follow-up MRI abdomen recommended in 6-8 weeks. 2.  Bile duct mass and gallbladder mass are without significant change. 3.  Iron deposition within the liver. 9/4/2024-MRI Abdomen-Resolution of right hepatic lesions, in keeping with infection. Bile duct mass, with associated common hepatic duct narrowing, without change. Slightly increased CBD dilatation with distal tapering. Unchanged intrahepatic biliary duct dilatation. Fundal gallbladder mass, slightly decreased in size. 11/6/2024-CT A/P-Fundal gallbladder mass is slightly decreased in size since 9/4/2024. CBD dilatation, unchanged. Known CBD mass is difficult to visualize on CT.  11/25/2024-Completed 8th cycle Gemcitabine/Cisplatin/Durvalumab.  12/18/2024-MRI abdomen/MRCP-Since September 4, 2024, unchanged residual stricture at biliary ductal confluence and unchanged residual gallbladder tumor. No new suspicious finding. 1/2/2025-PET/CT scan-Compared to FDG PET/CT dated 5/28/2024: 1. FDG-avid lesion in ze hepatis adjacent to biliary stent and FDG-avid focus within the gallbladder, not well delineated on CT, are decreased in size and mildly decreased in metabolism, compatible with a partial response to interval therapy. 2. Resolution of small FDG-avid focus in right parotid region. 3. Resolution of FDG-avid focus in fundus of uterus. 4. No new lesion.  **1/24/2025-Patient was scheduled for an exp lap hepatectomy & bile duct resection - however intra-operatively was noted to have positive peritoneal implants, case was aborted and biopsies taken, path: - peritoneal bx: moderately differentiated adenocarcinoma - right diaphragmatic nodule: moderate to poorly differentiated adenocarcinoma - peritoneal bx #2: moderately differentiated adenocarcinoma PD-L1 TPS 75%, Her 2- (1+), pMMR; Foundation testing pending per path report.  2/26/2025-CT C/A/P-Heterogeneity of the gallbladder fundus again noted. Multiple peritoneal implants consistent with metastatic disease. As patient now with measurable progression of disease on imaging, recommended change in systemic therapy-to consider FOLFOX regimen.   3/3/3035-Began FOLFOX. Note, received message from GI/hepatobiliary research team at [facility] 2/26/2025-they do not have any trial options for this patient at this time. --clinically stable for treatment today if CBC adequate. Have had to hold chemo x 2 weeks due to cytopenias (thrombocytopenia+/- neutropenia)-will now decrease chemo dosage by ~20%-assess tolerance/response-patient/ advised. --continue to monitor LFTs. 4/11/2025 Abdominal US-Known gallbladder malignancy with heterogeneous and ill-defined soft tissue extending from gallbladder into the adjacent liver. Will obtain f/u US now in light of RUQ discomfort. May need t/c palliative RT if felt tumor related rather than stent related. --will give benadryl pre-oxaliplatin as did with cisplatin post an episode of hives with one of the earlier treatments with [place]-Cis (remainder of cycles without adverse reaction)-have discussed with patient/-they have concurred with plans. Treatment RN to monitor closely for s/sx. reaction as per protocol.  #2) FDG-avid soft tissue nodule in right parotid gland nonspecific on PET/CT scan 5/25/2024.  F/U right parotid US 7/30/2024-The right parotid gland is normal in size and echogenicity. Again noted is 8 x 7 x 5 mm heterogeneous predominantly isoechoic nodularity in the superficial midportion of the parotid gland, unchanged. Findings are nonspecific. Consider follow up ultrasound in 6 months. 9/2024-Saw ENT MD Dr. Cid. 9/30/2024-SALIVARY GLAND, PAROTID, RIGHT INFERIOR, US GUIDED FNA NON DIAGNOSTIC. Benign salivary gland tissue only Resolution of small FDG-avid focus in right parotid region on PET/CT scan 12/24/2024. ---continue f/u with ENT MD   #3) indeterminate FDG-avid focus in fundus of uterus on PET/CT scan 5/25/2024. Pelvic US 7/30/2024-Leiomyomatous uterus. Subcentimeter bilateral simple ovarian cyst. Trace fundal endometrial fluid. Resolution of FDG-avid focus in fundus of uterus on 12/24/2024 PET/CT scan. --has seen GYN MD 1/2024-yearly f/u as planned  #4) mild neuropathy symptoms-PRN gabapentin; foot massage  Patient was given the opportunity to ask questions. Her questions have been answered to her apparent satisfaction at this time. She expressed her understanding and willingness to comply with recommended f/u.  -->FOLFOX-doses now decreased by ~20%. Cycle every 14 days. Neulasta Onpro support. Cycle #4 today if CBC adequate. RTO 1 week.

## 2025-05-05 NOTE — HISTORY OF PRESENT ILLNESS
[de-identified] : 5/2024-Patient presented to [facility] with elevated LFTs. At that time, she also noted progressively darkening urine and pale stools for one week along with postprandial epigastric pain. 5/10/2024-Abdominal ultrasound-moderate intrahepatic and extrahepatic biliary dilatation.  Abnormal appearing gallbladder.  Abnormal soft tissue density within the distal portion of the common bile duct and at the ze hepatitis.  The constellation of findings is most concerning for possible gallbladder neoplasm with extension to the ze habitus and associated biliary obstruction.  5/11/2024-CT abdomen/pelvis-gallbladder mass and soft tissue within the ze hepatis with enlarged portal caval node.  Soft tissue involvement of the biliary ducts and vasculature.  No evidence of metastatic disease within the chest. 5/11/2024 MR/MRCP-there is an approximately 2.2 cm obstructing mass centered at the bifurcation of the common hepatic duct with intrahepatic biliary duct dilatation, highly suspicious for cholangiocarcinoma ([place] tumor).  Masslike appearance of the gallbladder fundus with cystic changes measuring approximately 3.3 cm, which could represent either masslike adenoma I will mitosis versus a separate gallbladder mass.  No evidence of metastatic disease within the abdomen.  5/14/2024-EUS/ERCP 5/14/2024 (Dr. Dangelo) - proximal biliary mass without involvement of the portal vein, large ze hepatic LAD & gallbladder lesion noted - ERCP notable for hilar stricture s/p sphincterotomy, dilation of the stricture to 6 mm, brushing, biopsy, cholangioscopy & PLASTIC stent placed. *repeat in 3 months for stent removal/exchange if no sx done - cholangioscopy notable for abnormal mucosa of the proximal bile duct w/ narrowing and nodularity w/ decreased vascular pattern s/p spy bite bx ** biopsy of CBD structure c/w carcinoma, favor adeno Common bile duct stricture biopsy-small clusters of atypical cells, detached were within stroma, compatible with carcinoma and favor adenocarcinoma. Bile duct mass biopsy-minute fragments of fibrous tissue with marked crush artifact and rare, atypical cells singly or in small clusters.  5/28/2024-PET/CT scan- FDG-avid soft tissue within the ze hepatis, surrounding common bile duct stent, extending into gallbladder/gallbladder fossa, corresponds to known malignancy. FDG-avid soft tissue nodule in right parotid gland is nonspecific. Differential diagnosis includes benign and malignant salivary gland neoplasms and an intraparotid lymph node. Further evaluation may be performed with ultrasound and percutaneous needle biopsy. Indeterminate FDG-avid focus in fundus of uterus. Pelvic ultrasound/pelvic MRI may be obtained for further evaluation.  6/10/2024-Started neoadjuvant Gemcitabine/Cisplatin/Durvalumab.  Course complicated by biliary stent issues, Klebsiella bacteremia (7/1/2024).  7/2/2024-CT abdomen/pelvis-IMPRESSION: Adequate positioning of the biliary stent, however there is no  pneumobilia to suggest patency. Mild intrahepatic biliary ductal  dilatation. New from 6/4/2024 are ill-defined hypodensities scattered throughout the  bilateral hepatic lobes, suggestive of metastatic disease.  7/3/24-MRI Abdomen-IMPRESSION: 1.  Focal stricture of the common hepatic duct/common biliary duct  measuring 1.6 cm with progressive enhancement, consistent with  cholangiocarcinoma. CBD stent traverse through the focal stricture. Mild  intra and extrahepatic biliary ductal dilatation. 2.  Complex solid and cystic lesion in the gallbladder fossa with areas  of thickened septations/nodular enhancement. 3.  Multiple subcentimeter T2 hyperintense foci throughout the hepatic  parenchyma and few of which in the right hepatic lobe demonstrating  peripheral rim enhancement. Although nonspecific, these are suspicious  for microabscesses.  9/4/2024-MRI Abdomen-Resolution of right hepatic lesions, in keeping with infection. Bile duct mass, with associated common hepatic duct narrowing, without change. Slightly increased CBD dilatation with distal tapering. Unchanged intrahepatic biliary duct dilatation. Fundal gallbladder mass, slightly decreased in size. 11/6/2024-CT A/P-Fundal gallbladder mass is slightly decreased in size since 9/4/2024. CBD dilatation, unchanged. Known CBD mass is difficult to visualize on CT.  11/25/2024-Completed 8th cycle Gemcitabine/Cisplatin/Durvalumab.  12/18/2024-MRI abdomen/MRCP-. Since September 4, 2024, unchanged residual stricture at biliary ductal confluence and unchanged residual gallbladder tumor. No new suspicious finding.  1/2/2025-PET/CT scan-Compared to FDG PET/CT dated 5/28/2024: 1. FDG-avid lesion in ze hepatis adjacent to biliary stent and FDG-avid focus within the gallbladder, not well delineated on CT, are decreased in size and mildly decreased in metabolism, compatible with a partial response to interval therapy. 2. Resolution of small FDG-avid focus in right parotid region. 3. Resolution of FDG-avid focus in fundus of uterus. 4. No new lesion.  1/24/2025-Patient was scheduled for an exp lap hepatectomy & bile duct resection - however intra-operatively was noted to have positive peritoneal implants, case was aborted and biopsies taken, path: - peritoneal bx: moderately differentiated adeno - right diaphragmatic nodule: moderate to poorly differentiated adeno - peritoneal bx #2: moderately differentiated adeno PD-L1 TPS 75%, Her 2- (1+), pMMR  2/26/2025-CT C/A/P-Heterogeneity of the gallbladder fundus again noted. Multiple peritoneal implants consistent with metastatic disease. 3/3/2025-Began FOLFOX.  [de-identified] : Adenocarcinoma [de-identified] : 5/21/2024-CA 19-9=290 [de-identified] : 7/15/2024-[employer] Liquid biopsy-ZHANE, TMB=1 Muts/Mb. ARID1A and TET2 mutations.  No  diagnostic alterations for [employer] liquid CDX were detected. [de-identified] : Biliary stent changed 4/17/25-RUQ uncomfortable now, though wants no pain medicine for currently. Eating small meals at a time. No N/V/D. h/o Mild feet neuropathy. No falls. No difficulty walking or dropping things. Not taking gabapentin for this. [facility]. No SOB. No melena, bleeding reported.  Notes thinks had transient hives with 2nd or 3rd cycle of [place]/Cis chemo-received benadryl pre-remainder of cycles with no adverse reaction.

## 2025-05-05 NOTE — HISTORY OF PRESENT ILLNESS
[de-identified] : 5/2024-Patient presented to [facility] with elevated LFTs. At that time, she also noted progressively darkening urine and pale stools for one week along with postprandial epigastric pain. 5/10/2024-Abdominal ultrasound-moderate intrahepatic and extrahepatic biliary dilatation.  Abnormal appearing gallbladder.  Abnormal soft tissue density within the distal portion of the common bile duct and at the ze hepatitis.  The constellation of findings is most concerning for possible gallbladder neoplasm with extension to the ze habitus and associated biliary obstruction.  5/11/2024-CT abdomen/pelvis-gallbladder mass and soft tissue within the ze hepatis with enlarged portal caval node.  Soft tissue involvement of the biliary ducts and vasculature.  No evidence of metastatic disease within the chest. 5/11/2024 MR/MRCP-there is an approximately 2.2 cm obstructing mass centered at the bifurcation of the common hepatic duct with intrahepatic biliary duct dilatation, highly suspicious for cholangiocarcinoma ([place] tumor).  Masslike appearance of the gallbladder fundus with cystic changes measuring approximately 3.3 cm, which could represent either masslike adenoma I will mitosis versus a separate gallbladder mass.  No evidence of metastatic disease within the abdomen.  5/14/2024-EUS/ERCP 5/14/2024 (Dr. Dangelo) - proximal biliary mass without involvement of the portal vein, large ze hepatic LAD & gallbladder lesion noted - ERCP notable for hilar stricture s/p sphincterotomy, dilation of the stricture to 6 mm, brushing, biopsy, cholangioscopy & PLASTIC stent placed. *repeat in 3 months for stent removal/exchange if no sx done - cholangioscopy notable for abnormal mucosa of the proximal bile duct w/ narrowing and nodularity w/ decreased vascular pattern s/p spy bite bx ** biopsy of CBD structure c/w carcinoma, favor adeno Common bile duct stricture biopsy-small clusters of atypical cells, detached were within stroma, compatible with carcinoma and favor adenocarcinoma. Bile duct mass biopsy-minute fragments of fibrous tissue with marked crush artifact and rare, atypical cells singly or in small clusters.  5/28/2024-PET/CT scan- FDG-avid soft tissue within the ze hepatis, surrounding common bile duct stent, extending into gallbladder/gallbladder fossa, corresponds to known malignancy. FDG-avid soft tissue nodule in right parotid gland is nonspecific. Differential diagnosis includes benign and malignant salivary gland neoplasms and an intraparotid lymph node. Further evaluation may be performed with ultrasound and percutaneous needle biopsy. Indeterminate FDG-avid focus in fundus of uterus. Pelvic ultrasound/pelvic MRI may be obtained for further evaluation.  6/10/2024-Started neoadjuvant Gemcitabine/Cisplatin/Durvalumab.  Course complicated by biliary stent issues, Klebsiella bacteremia (7/1/2024).  7/2/2024-CT abdomen/pelvis-IMPRESSION: Adequate positioning of the biliary stent, however there is no  pneumobilia to suggest patency. Mild intrahepatic biliary ductal  dilatation. New from 6/4/2024 are ill-defined hypodensities scattered throughout the  bilateral hepatic lobes, suggestive of metastatic disease.  7/3/24-MRI Abdomen-IMPRESSION: 1.  Focal stricture of the common hepatic duct/common biliary duct  measuring 1.6 cm with progressive enhancement, consistent with  cholangiocarcinoma. CBD stent traverse through the focal stricture. Mild  intra and extrahepatic biliary ductal dilatation. 2.  Complex solid and cystic lesion in the gallbladder fossa with areas  of thickened septations/nodular enhancement. 3.  Multiple subcentimeter T2 hyperintense foci throughout the hepatic  parenchyma and few of which in the right hepatic lobe demonstrating  peripheral rim enhancement. Although nonspecific, these are suspicious  for microabscesses.  9/4/2024-MRI Abdomen-Resolution of right hepatic lesions, in keeping with infection. Bile duct mass, with associated common hepatic duct narrowing, without change. Slightly increased CBD dilatation with distal tapering. Unchanged intrahepatic biliary duct dilatation. Fundal gallbladder mass, slightly decreased in size. 11/6/2024-CT A/P-Fundal gallbladder mass is slightly decreased in size since 9/4/2024. CBD dilatation, unchanged. Known CBD mass is difficult to visualize on CT.  11/25/2024-Completed 8th cycle Gemcitabine/Cisplatin/Durvalumab.  12/18/2024-MRI abdomen/MRCP-. Since September 4, 2024, unchanged residual stricture at biliary ductal confluence and unchanged residual gallbladder tumor. No new suspicious finding.  1/2/2025-PET/CT scan-Compared to FDG PET/CT dated 5/28/2024: 1. FDG-avid lesion in ze hepatis adjacent to biliary stent and FDG-avid focus within the gallbladder, not well delineated on CT, are decreased in size and mildly decreased in metabolism, compatible with a partial response to interval therapy. 2. Resolution of small FDG-avid focus in right parotid region. 3. Resolution of FDG-avid focus in fundus of uterus. 4. No new lesion.  1/24/2025-Patient was scheduled for an exp lap hepatectomy & bile duct resection - however intra-operatively was noted to have positive peritoneal implants, case was aborted and biopsies taken, path: - peritoneal bx: moderately differentiated adeno - right diaphragmatic nodule: moderate to poorly differentiated adeno - peritoneal bx #2: moderately differentiated adeno PD-L1 TPS 75%, Her 2- (1+), pMMR  2/26/2025-CT C/A/P-Heterogeneity of the gallbladder fundus again noted. Multiple peritoneal implants consistent with metastatic disease. 3/3/2025-Began FOLFOX.  [de-identified] : Adenocarcinoma [de-identified] : 5/21/2024-CA 19-9=290 [de-identified] : 7/15/2024-[employer] Liquid biopsy-ZHANE, TMB=1 Muts/Mb. ARID1A and TET2 mutations.  No  diagnostic alterations for [employer] liquid CDX were detected. [de-identified] : Biliary stent changed 4/17/25-RUQ uncomfortable now, though wants no pain medicine for currently. Eating small meals at a time. No N/V/D. h/o Mild feet neuropathy. No falls. No difficulty walking or dropping things. Not taking gabapentin for this. [facility]. No SOB. No melena, bleeding reported.  Notes thinks had transient hives with 2nd or 3rd cycle of [place]/Cis chemo-received benadryl pre-remainder of cycles with no adverse reaction.

## 2025-05-12 NOTE — PLAN
Statement Selected
[FreeTextEntry1] : - oncology follow up and para  
[FreeTextEntry1] : - oncology follow up and para

## 2025-05-12 NOTE — HISTORY OF PRESENT ILLNESS
[Other Location: e.g. School (Enter Location, City,State)___] : at [unfilled], at the time of the visit. [Other Location: e.g. Home (Enter Location, City,State)___] : at [unfilled] [Telehealth (audio & video)] : This visit was provided via telehealth using real-time 2-way audio visual technology. [Verbal consent obtained from patient] : the patient, [unfilled] [Post-hospitalization from ___ Hospital] : Post-hospitalization from [unfilled] Hospital [Admitted on: ___] : The patient was admitted on [unfilled] [Discharged on ___] : discharged on [unfilled] [FreeTextEntry2] : 74F with cholangiocarcinoma on FOLFOX s/p biliary stent (exchanged 4/17) presenting with abd pain. A CT scan showed increasing peritoneal thickening and nodularity consistent with carcinomatosis, ascites and mild dilatation of biliary ducts.  She underwent an ERCP on 5/6 with stone extraction and exchange of 2 plastic stents.  Her bilirubin improved.  She as on abx briefly but all cultures were negative.   Pt is receiving chemo infusion during the appointment.  She states she feels well right now.  She does note the abd is increasing in size and she feels a heaviness there.  She is only able to tolerate small frequent meals - denies n/v.  She is using Tylenol for pain.  She is urinary frequently due to abdominal pressure.  Pt states she is being scheduled for a para to remove the fluid. She is currently not taking any medications daily such as gabapentin and zofran.    Time on call 15 mins.

## 2025-05-12 NOTE — REVIEW OF SYSTEMS
[Fatigue] : fatigue [Abdominal Pain] : abdominal pain [Nausea] : nausea [Frequency] : frequency [Muscle Pain] : muscle pain [Back Pain] : back pain [Fever] : no fever [Chills] : no chills [Discharge] : no discharge [Vision Problems] : no vision problems [Earache] : no earache [Hearing Loss] : no hearing loss [Sore Throat] : no sore throat [Chest Pain] : no chest pain [Palpitations] : no palpitations [Shortness Of Breath] : no shortness of breath [Wheezing] : no wheezing [Cough] : no cough [Vomiting] : no vomiting [Dysuria] : no dysuria [Incontinence] : no incontinence [Itching] : no itching [Headache] : no headache [Memory Loss] : no memory loss [FreeTextEntry7] : + ascites

## 2025-05-12 NOTE — REVIEW OF SYSTEMS
[Diarrhea: Grade 0] : Diarrhea: Grade 0 [Negative] : Allergic/Immunologic [Palpitations] : palpitations [Fever] : no fever [Chest Pain] : no chest pain [Lower Ext Edema] : no lower extremity edema [FreeTextEntry7] : feels distended [FreeTextEntry9] : back pain

## 2025-05-12 NOTE — PHYSICAL EXAM
[No Acute Distress] : no acute distress [Well Nourished] : well nourished [Normal Voice/Communication] : normal voice/communication [Normal Sclera/Conjunctiva] : normal sclera/conjunctiva [EOMI] : extraocular movements intact [Normal Outer Ear/Nose] : the outer ears and nose were normal in appearance [No Respiratory Distress] : no respiratory distress  [Memory Grossly Normal] : memory grossly normal [Normal Insight/Judgement] : insight and judgment were intact [61527 - Moderate Complexity requires multiple possible diagnoses and/or the management options, moderate complexity of the medical data (tests, etc.) to be reviewed, and moderate risk of significant complications, morbidity, and/or mortality as well as co] : Moderate Complexity

## 2025-05-12 NOTE — HISTORY OF PRESENT ILLNESS
[Disease: _____________________] : Disease: [unfilled] [de-identified] : 5/2024-Patient presented to [facility] with elevated LFTs. At that time, she also noted progressively darkening urine and pale stools for one week along with postprandial epigastric pain. 5/10/2024-Abdominal ultrasound-moderate intrahepatic and extrahepatic biliary dilatation.  Abnormal appearing gallbladder.  Abnormal soft tissue density within the distal portion of the common bile duct and at the ze hepatitis.  The constellation of findings is most concerning for possible gallbladder neoplasm with extension to the ze habitus and associated biliary obstruction.  5/11/2024-CT abdomen/pelvis-gallbladder mass and soft tissue within the ze hepatis with enlarged portal caval node.  Soft tissue involvement of the biliary ducts and vasculature.  No evidence of metastatic disease within the chest. 5/11/2024 MR/MRCP-there is an approximately 2.2 cm obstructing mass centered at the bifurcation of the common hepatic duct with intrahepatic biliary duct dilatation, highly suspicious for cholangiocarcinoma (Kl[place]kin tumor).  Masslike appearance of the gallbladder fundus with cystic changes measuring approximately 3.3 cm, which could represent either masslike adenoma I will mitosis versus a separate gallbladder mass.  No evidence of metastatic disease within the abdomen.  5/14/2024-EUS/ERCP 5/14/2024 (Dr. Dangelo) - proximal biliary mass without involvement of the portal vein, large ze hepatic LAD & gallbladder lesion noted - ERCP notable for hilar stricture s/p sphincterotomy, dilation of the stricture to 6 mm, brushing, biopsy, cholangioscopy & PLASTIC stent placed. *repeat in 3 months for stent removal/exchange if no sx done - cholangioscopy notable for abnormal mucosa of the proximal bile duct w/ narrowing and nodularity w/ decreased vascular pattern s/p spy bite bx ** biopsy of CBD structure c/w carcinoma, favor adeno Common bile duct stricture biopsy-small clusters of atypical cells, detached were within stroma, compatible with carcinoma and favor adenocarcinoma. Bile duct mass biopsy-minute fragments of fibrous tissue with marked crush artifact and rare, atypical cells singly or in small clusters.  5/28/2024-PET/CT scan- FDG-avid soft tissue within the ze hepatis, surrounding common bile duct stent, extending into gallbladder/gallbladder fossa, corresponds to known malignancy. FDG-avid soft tissue nodule in right parotid gland is nonspecific. Differential diagnosis includes benign and malignant salivary gland neoplasms and an intraparotid lymph node. Further evaluation may be performed with ultrasound and percutaneous needle biopsy. Indeterminate FDG-avid focus in fundus of uterus. Pelvic ultrasound/pelvic MRI may be obtained for further evaluation.  6/10/2024-Started neoadjuvant Gemcitabine/Cisplatin/Durvalumab.  Course complicated by biliary stent issues, Klebsiella bacteremia (7/1/2024).  7/2/2024-CT abdomen/pelvis-IMPRESSION: Adequate positioning of the biliary stent, however there is no  pneumobilia to suggest patency. Mild intrahepatic biliary ductal  dilatation. New from 6/4/2024 are ill-defined hypodensities scattered throughout the  bilateral hepatic lobes, suggestive of metastatic disease.  7/3/24-MRI Abdomen-IMPRESSION: 1.  Focal stricture of the common hepatic duct/common biliary duct  measuring 1.6 cm with progressive enhancement, consistent with  cholangiocarcinoma. CBD stent traverse through the focal stricture. Mild  intra and extrahepatic biliary ductal dilatation. 2.  Complex solid and cystic lesion in the gallbladder fossa with areas  of thickened septations/nodular enhancement. 3.  Multiple subcentimeter T2 hyperintense foci throughout the hepatic  parenchyma and few of which in the right hepatic lobe demonstrating  peripheral rim enhancement. Although nonspecific, these are suspicious  for microabscesses.  9/4/2024-MRI Abdomen-Resolution of right hepatic lesions, in keeping with infection. Bile duct mass, with associated common hepatic duct narrowing, without change. Slightly increased CBD dilatation with distal tapering. Unchanged intrahepatic biliary duct dilatation. Fundal gallbladder mass, slightly decreased in size. 11/6/2024-CT A/P-Fundal gallbladder mass is slightly decreased in size since 9/4/2024. CBD dilatation, unchanged. Known CBD mass is difficult to visualize on CT.  11/25/2024-Completed 8th cycle Gemcitabine/Cisplatin/Durvalumab.  12/18/2024-MRI abdomen/MRCP-. Since September 4, 2024, unchanged residual stricture at biliary ductal confluence and unchanged residual gallbladder tumor. No new suspicious finding.  1/2/2025-PET/CT scan-Compared to FDG PET/CT dated 5/28/2024: 1. FDG-avid lesion in ze hepatis adjacent to biliary stent and FDG-avid focus within the gallbladder, not well delineated on CT, are decreased in size and mildly decreased in metabolism, compatible with a partial response to interval therapy. 2. Resolution of small FDG-avid focus in right parotid region. 3. Resolution of FDG-avid focus in fundus of uterus. 4. No new lesion.  1/24/2025-Patient was scheduled for an exp lap hepatectomy & bile duct resection - however intra-operatively was noted to have positive peritoneal implants, case was aborted and biopsies taken, path: - peritoneal bx: moderately differentiated adeno - right diaphragmatic nodule: moderate to poorly differentiated adeno - peritoneal bx #2: moderately differentiated adeno PD-L1 TPS 75%, Her 2- (1+), pMMR  2/26/2025-CT C/A/P-Heterogeneity of the gallbladder fundus again noted. Multiple peritoneal implants consistent with metastatic disease. 3/3/2025-Began FOLFOX.  Hospital Course: Discharge Date 07-May-2025 Admission Date 05-May-2025 21:40 Reason for Admission RUQ pain i/s/o cholangiocarcinoma Hospital Course: 74 F with PMH cholangiocarcinoma on FOLFOX (last ~4/2025) S/P biliary stent (last exchanged 4/17) who presents with tachycardia, and RUQ/RLQ abdominal pain over the last 2 weeks. CT A/P noted carcinomatosis celestino mild ascites, biliary stent in place with mild dilatation of bile ducts. GI consulted, s/p ERCP 5/6 w/ stone extraction and exchange of 2 plastic stents. Empiric zosyn started, BCX NTD. Patient tolerating regular diet well, medically cleared for discharge.  05/05/2025: CT ABDOMEN AND PELVIS CT ANGIO CHEST PULM ART [facility]   ORDERED BY: TANJA SIMMONS PROCEDURE: CT Angiography of the Chest was performed followed by portal venous phase imaging of the Abdomen and Pelvis. Sagittal and coronal reformats were performed as well as 3D (MIP) reconstructions.  FINDINGS: CHEST: LUNGS AND LARGE AIRWAYS: Patent central airways. Subsegmental atelectasis in the bilateral lower lobes and lingula. PLEURA: No pleural effusion. VESSELS: No pulmonary embolism. Aortic calcifications. MediPort catheter tip terminates in the SVC. HEART: Heart size is normal. No pericardial effusion. MEDIASTINUM AND DANNY: No lymphadenopathy. CHEST WALL AND LOWER NECK: Right chest wall MediPort.  ABDOMEN AND PELVIS: LIVER: Within normal limits. BILE DUCTS: Mild intrahepatic biliary ductal dilatation. CBD stent is unchanged in position. Pneumobilia is no longer noted. GALLBLADDER: Unchanged appearance of the gallbladder fundus with ill-defined heterogeneity. SPLEEN: Borderline splenomegaly measuring 13.0 cm in craniocaudal dimension. PANCREAS: Within normal limits. ADRENALS: Within normal limits. KIDNEYS/URETERS: Bilateral subcentimeter hypodensities which are too small to characterize. No hydronephrosis.  BLADDER: Within normal limits. REPRODUCTIVE ORGANS: Fibroid uterus with interval increase in heterogeneous enhancement, either reflecting fibroid degeneration or worsening implants in the cul-de-sac now appearing inseparable from the uterus.  BOWEL: No bowel obstruction. Colonic diverticulosis. Appendix is normal. PERITONEUM/RETROPERITONEUM: Small to moderate ascites with peritoneal thickening and overall increase in nodularity, for reference a left anterior nodule currently measuring 1.5 cm (303-71), previously 0.9 cm, and the new right anterior nodule measuring 1.2 cm (303-62) consistent with implants. VESSELS: Within normal limits. LYMPH NODES: No lymphadenopathy. ABDOMINAL WALL: Within normal limits. BONES: Degenerative changes.  IMPRESSION: No pulmonary embolism. Overall increase of peritoneal thickening and nodularity with multiple implants which are increasing in size/number consistent with carcinomatosis. Small to moderate ascites.  5/5/25-Abdominal US-IMPRESSION: Small amount of ascites. Biliary stent     [de-identified] : Adenocarcinoma [de-identified] : 5/21/2024-CA 19-9=290 [de-identified] : 7/15/2024-[employer] Liquid biopsy-ZHANE, TMB=1 Muts/Mb. ARID1A and TET2 mutations.  No  diagnostic alterations for [employer] liquid CDX were detected. [de-identified] : S/p hospitalization (5/5/25-5/7/25). S/p ERCP 5/6 w/ stone extraction and exchange of 2 plastic stents.  S/p 1unit platelet on 5/10/25. Reports abdominal distention since d/c from the hospital. She is eating-gets full easily. No N/V/D. Takes Tylenol 500mg at night for back pain. h/o Mild feet neuropathy. No falls. No difficulty walking or dropping things. Not taking gabapentin for this. [facility]. No chest pain, SOB. No melena, bleeding reported.  Notes thinks had transient hives with 2nd or 3rd cycle of [place]/Cis chemo-received benadryl pre-remainder of cycles with no adverse reaction.

## 2025-05-12 NOTE — PHYSICAL EXAM
[Restricted in physically strenuous activity but ambulatory and able to carry out work of a light or sedentary nature] : Status 1- Restricted in physically strenuous activity but ambulatory and able to carry out work of a light or sedentary nature, e.g., light house work, office work [Normal] : affect appropriate [de-identified] : soft, mildly tender to palpation RUQ without rebound

## 2025-05-12 NOTE — HISTORY OF PRESENT ILLNESS
[Disease: _____________________] : Disease: [unfilled] [de-identified] : 5/2024-Patient presented to [facility] with elevated LFTs. At that time, she also noted progressively darkening urine and pale stools for one week along with postprandial epigastric pain. 5/10/2024-Abdominal ultrasound-moderate intrahepatic and extrahepatic biliary dilatation.  Abnormal appearing gallbladder.  Abnormal soft tissue density within the distal portion of the common bile duct and at the ze hepatitis.  The constellation of findings is most concerning for possible gallbladder neoplasm with extension to the ze habitus and associated biliary obstruction.  5/11/2024-CT abdomen/pelvis-gallbladder mass and soft tissue within the ze hepatis with enlarged portal caval node.  Soft tissue involvement of the biliary ducts and vasculature.  No evidence of metastatic disease within the chest. 5/11/2024 MR/MRCP-there is an approximately 2.2 cm obstructing mass centered at the bifurcation of the common hepatic duct with intrahepatic biliary duct dilatation, highly suspicious for cholangiocarcinoma (Kl[place]kin tumor).  Masslike appearance of the gallbladder fundus with cystic changes measuring approximately 3.3 cm, which could represent either masslike adenoma I will mitosis versus a separate gallbladder mass.  No evidence of metastatic disease within the abdomen.  5/14/2024-EUS/ERCP 5/14/2024 (Dr. Dangelo) - proximal biliary mass without involvement of the portal vein, large ze hepatic LAD & gallbladder lesion noted - ERCP notable for hilar stricture s/p sphincterotomy, dilation of the stricture to 6 mm, brushing, biopsy, cholangioscopy & PLASTIC stent placed. *repeat in 3 months for stent removal/exchange if no sx done - cholangioscopy notable for abnormal mucosa of the proximal bile duct w/ narrowing and nodularity w/ decreased vascular pattern s/p spy bite bx ** biopsy of CBD structure c/w carcinoma, favor adeno Common bile duct stricture biopsy-small clusters of atypical cells, detached were within stroma, compatible with carcinoma and favor adenocarcinoma. Bile duct mass biopsy-minute fragments of fibrous tissue with marked crush artifact and rare, atypical cells singly or in small clusters.  5/28/2024-PET/CT scan- FDG-avid soft tissue within the ze hepatis, surrounding common bile duct stent, extending into gallbladder/gallbladder fossa, corresponds to known malignancy. FDG-avid soft tissue nodule in right parotid gland is nonspecific. Differential diagnosis includes benign and malignant salivary gland neoplasms and an intraparotid lymph node. Further evaluation may be performed with ultrasound and percutaneous needle biopsy. Indeterminate FDG-avid focus in fundus of uterus. Pelvic ultrasound/pelvic MRI may be obtained for further evaluation.  6/10/2024-Started neoadjuvant Gemcitabine/Cisplatin/Durvalumab.  Course complicated by biliary stent issues, Klebsiella bacteremia (7/1/2024).  7/2/2024-CT abdomen/pelvis-IMPRESSION: Adequate positioning of the biliary stent, however there is no  pneumobilia to suggest patency. Mild intrahepatic biliary ductal  dilatation. New from 6/4/2024 are ill-defined hypodensities scattered throughout the  bilateral hepatic lobes, suggestive of metastatic disease.  7/3/24-MRI Abdomen-IMPRESSION: 1.  Focal stricture of the common hepatic duct/common biliary duct  measuring 1.6 cm with progressive enhancement, consistent with  cholangiocarcinoma. CBD stent traverse through the focal stricture. Mild  intra and extrahepatic biliary ductal dilatation. 2.  Complex solid and cystic lesion in the gallbladder fossa with areas  of thickened septations/nodular enhancement. 3.  Multiple subcentimeter T2 hyperintense foci throughout the hepatic  parenchyma and few of which in the right hepatic lobe demonstrating  peripheral rim enhancement. Although nonspecific, these are suspicious  for microabscesses.  9/4/2024-MRI Abdomen-Resolution of right hepatic lesions, in keeping with infection. Bile duct mass, with associated common hepatic duct narrowing, without change. Slightly increased CBD dilatation with distal tapering. Unchanged intrahepatic biliary duct dilatation. Fundal gallbladder mass, slightly decreased in size. 11/6/2024-CT A/P-Fundal gallbladder mass is slightly decreased in size since 9/4/2024. CBD dilatation, unchanged. Known CBD mass is difficult to visualize on CT.  11/25/2024-Completed 8th cycle Gemcitabine/Cisplatin/Durvalumab.  12/18/2024-MRI abdomen/MRCP-. Since September 4, 2024, unchanged residual stricture at biliary ductal confluence and unchanged residual gallbladder tumor. No new suspicious finding.  1/2/2025-PET/CT scan-Compared to FDG PET/CT dated 5/28/2024: 1. FDG-avid lesion in ze hepatis adjacent to biliary stent and FDG-avid focus within the gallbladder, not well delineated on CT, are decreased in size and mildly decreased in metabolism, compatible with a partial response to interval therapy. 2. Resolution of small FDG-avid focus in right parotid region. 3. Resolution of FDG-avid focus in fundus of uterus. 4. No new lesion.  1/24/2025-Patient was scheduled for an exp lap hepatectomy & bile duct resection - however intra-operatively was noted to have positive peritoneal implants, case was aborted and biopsies taken, path: - peritoneal bx: moderately differentiated adeno - right diaphragmatic nodule: moderate to poorly differentiated adeno - peritoneal bx #2: moderately differentiated adeno PD-L1 TPS 75%, Her 2- (1+), pMMR  2/26/2025-CT C/A/P-Heterogeneity of the gallbladder fundus again noted. Multiple peritoneal implants consistent with metastatic disease. 3/3/2025-Began FOLFOX.  Hospital Course: Discharge Date 07-May-2025 Admission Date 05-May-2025 21:40 Reason for Admission RUQ pain i/s/o cholangiocarcinoma Hospital Course: 74 F with PMH cholangiocarcinoma on FOLFOX (last ~4/2025) S/P biliary stent (last exchanged 4/17) who presents with tachycardia, and RUQ/RLQ abdominal pain over the last 2 weeks. CT A/P noted carcinomatosis celestino mild ascites, biliary stent in place with mild dilatation of bile ducts. GI consulted, s/p ERCP 5/6 w/ stone extraction and exchange of 2 plastic stents. Empiric zosyn started, BCX NTD. Patient tolerating regular diet well, medically cleared for discharge.  05/05/2025: CT ABDOMEN AND PELVIS CT ANGIO CHEST PULM ART [facility]   ORDERED BY: TANJA SIMMONS PROCEDURE: CT Angiography of the Chest was performed followed by portal venous phase imaging of the Abdomen and Pelvis. Sagittal and coronal reformats were performed as well as 3D (MIP) reconstructions.  FINDINGS: CHEST: LUNGS AND LARGE AIRWAYS: Patent central airways. Subsegmental atelectasis in the bilateral lower lobes and lingula. PLEURA: No pleural effusion. VESSELS: No pulmonary embolism. Aortic calcifications. MediPort catheter tip terminates in the SVC. HEART: Heart size is normal. No pericardial effusion. MEDIASTINUM AND DANNY: No lymphadenopathy. CHEST WALL AND LOWER NECK: Right chest wall MediPort.  ABDOMEN AND PELVIS: LIVER: Within normal limits. BILE DUCTS: Mild intrahepatic biliary ductal dilatation. CBD stent is unchanged in position. Pneumobilia is no longer noted. GALLBLADDER: Unchanged appearance of the gallbladder fundus with ill-defined heterogeneity. SPLEEN: Borderline splenomegaly measuring 13.0 cm in craniocaudal dimension. PANCREAS: Within normal limits. ADRENALS: Within normal limits. KIDNEYS/URETERS: Bilateral subcentimeter hypodensities which are too small to characterize. No hydronephrosis.  BLADDER: Within normal limits. REPRODUCTIVE ORGANS: Fibroid uterus with interval increase in heterogeneous enhancement, either reflecting fibroid degeneration or worsening implants in the cul-de-sac now appearing inseparable from the uterus.  BOWEL: No bowel obstruction. Colonic diverticulosis. Appendix is normal. PERITONEUM/RETROPERITONEUM: Small to moderate ascites with peritoneal thickening and overall increase in nodularity, for reference a left anterior nodule currently measuring 1.5 cm (303-71), previously 0.9 cm, and the new right anterior nodule measuring 1.2 cm (303-62) consistent with implants. VESSELS: Within normal limits. LYMPH NODES: No lymphadenopathy. ABDOMINAL WALL: Within normal limits. BONES: Degenerative changes.  IMPRESSION: No pulmonary embolism. Overall increase of peritoneal thickening and nodularity with multiple implants which are increasing in size/number consistent with carcinomatosis. Small to moderate ascites.  5/5/25-Abdominal US-IMPRESSION: Small amount of ascites. Biliary stent     [de-identified] : Adenocarcinoma [de-identified] : 5/21/2024-CA 19-9=290 [de-identified] : 7/15/2024-[employer] Liquid biopsy-ZHANE, TMB=1 Muts/Mb. ARID1A and TET2 mutations.  No  diagnostic alterations for [employer] liquid CDX were detected. [de-identified] : S/p hospitalization (5/5/25-5/7/25). S/p ERCP 5/6 w/ stone extraction and exchange of 2 plastic stents.  S/p 1unit platelet on 5/10/25. Reports abdominal distention since d/c from the hospital. She is eating-gets full easily. No N/V/D. Takes Tylenol 500mg at night for back pain. h/o Mild feet neuropathy. No falls. No difficulty walking or dropping things. Not taking gabapentin for this. [facility] to [facility]. No chest pain, SOB. No melena, bleeding reported.  Notes thinks had transient hives with 2nd or 3rd cycle of [place]/Cis chemo-received benadryl pre-remainder of cycles with no adverse reaction.

## 2025-05-12 NOTE — ASSESSMENT
[FreeTextEntry1] : Lab work reviewed, 5/5/25-ED note reviewed, 5/5/25-CTAP, CTA, Abdominal US reviewed. #1) GB carcinoma-T4N1(Stage IIIC) clinically. 5/28/2024-PET/CT scan-. FDG-avid soft tissue within the ze hepatis, surrounding common bile duct stent, extending into gallbladder/gallbladder fossa, corresponds to known malignancy. FDG-avid soft tissue nodule in right parotid gland is nonspecific. Differential diagnosis includes benign and malignant salivary gland neoplasms and an intraparotid lymph node. Further evaluation may be performed with ultrasound and percutaneous needle biopsy. Indeterminate FDG-avid focus in fundus of uterus. Pelvic ultrasound/pelvic MRI may be obtained for further evaluation. -**Requested [facility], PD-L1 on pathology-insufficient tissue. 7/15/2024-Foundation[facility] Liquid biopsy-ZHANE, TMB=1 Muts/Mb. ARID1A and TET2 mutations.  No  diagnostic alterations for [employer] liquid CDX were detected. --had reviewed roles of surgery/radiation/systemic therapy in biliary tract cancers-requires a multidisciplinary approach.  5/30/2024-Had discussed case with surgeon Dr. Allison.  He recommended neoadjuvant systemic therapy with interval follow-up imaging at 2 months, and pending this, again at 4 months for surgical decisions.  6/10/2024-Started neoadjuvant Gemcitabine/Cisplatin/Durvalumab.  Course complicated by biliary stent issues, Klebsiella bacteremia. Following treatment of infection, resumed treatment.  With creatinine improved, resumed cisplatin.  Needed to further elucidate liver lesions: 7/2/2024-CT abdomen/pelvis-IMPRESSION: Adequate positioning of the biliary stent, however there is no  pneumobilia to suggest patency. Mild intrahepatic biliary ductal  dilatation. New from 6/4/2024 are ill-defined hypodensities scattered throughout the  bilateral hepatic lobes, suggestive of metastatic disease.  7/3/24-MRI Abdomen-IMPRESSION: 1.  Focal stricture of the common hepatic duct/common biliary duct  measuring 1.6 cm with progressive enhancement, consistent with  cholangiocarcinoma. CBD stent traverse through the focal stricture. Mild  intra and extrahepatic biliary ductal dilatation. 2.  Complex solid and cystic lesion in the gallbladder fossa with areas  of thickened septations/nodular enhancement. 3.  Multiple subcentimeter T2 hyperintense foci throughout the hepatic  parenchyma and few of which in the right hepatic lobe demonstrating  peripheral rim enhancement. Although nonspecific, these are suspicious  for microabscesses.  --?Micro abscesses vs. mets 7/18/2024-MRI Abdomen-1.  Several small right hepatic lobe liver lesions are without significant change from 7/3/2024. While favored to be inflammatory from prior cholangitis (improvement on imaging can lag behind clinical improvement), metastatic disease remains possible. Follow-up MRI abdomen recommended in 6-8 weeks. 2.  Bile duct mass and gallbladder mass are without significant change. 3.  Iron deposition within the liver. 9/4/2024-MRI Abdomen-Resolution of right hepatic lesions, in keeping with infection. Bile duct mass, with associated common hepatic duct narrowing, without change. Slightly increased CBD dilatation with distal tapering. Unchanged intrahepatic biliary duct dilatation. Fundal gallbladder mass, slightly decreased in size. 11/6/2024-CT A/P-Fundal gallbladder mass is slightly decreased in size since 9/4/2024. CBD dilatation, unchanged. Known CBD mass is difficult to visualize on CT.  11/25/2024-Completed 8th cycle Gemcitabine/Cisplatin/Durvalumab.  12/18/2024-MRI abdomen/MRCP-Since September 4, 2024, unchanged residual stricture at biliary ductal confluence and unchanged residual gallbladder tumor. No new suspicious finding. 1/2/2025-PET/CT scan-Compared to FDG PET/CT dated 5/28/2024: 1. FDG-avid lesion in ze hepatis adjacent to biliary stent and FDG-avid focus within the gallbladder, not well delineated on CT, are decreased in size and mildly decreased in metabolism, compatible with a partial response to interval therapy. 2. Resolution of small FDG-avid focus in right parotid region. 3. Resolution of FDG-avid focus in fundus of uterus. 4. No new lesion.  **1/24/2025-Patient was scheduled for an exp lap hepatectomy & bile duct resection - however intra-operatively was noted to have positive peritoneal implants, case was aborted and biopsies taken, path: - peritoneal bx: moderately differentiated adenocarcinoma - right diaphragmatic nodule: moderate to poorly differentiated adenocarcinoma - peritoneal bx #2: moderately differentiated adenocarcinoma PD-L1 TPS 75%, Her 2- (1+), pMMR; Foundation testing pending per path report.  2/26/2025-CT C/A/P-Heterogeneity of the gallbladder fundus again noted. Multiple peritoneal implants consistent with metastatic disease. As patient now with measurable progression of disease on imaging, recommended change in systemic therapy-to consider FOLFOX regimen.   3/3/2025-Began FOLFOX. Note, received message from GI/hepatobiliary research team at [facility] 2/26/2025-they do not have any trial options for this patient at this time. --clinically stable for treatment today if CBC adequate. Have had to hold chemo x 2 weeks due to cytopenias (thrombocytopenia+/- neutropenia)-will now decrease chemo dosage by ~20%-assess tolerance/response-patient/ advised. --continue to monitor LFTs. 4/11/2025 Abdominal US-Known gallbladder malignancy with heterogeneous and ill-defined soft tissue extending from gallbladder into the adjacent liver. Will obtain f/u US now in light of RUQ discomfort. May need t/c palliative RT if felt tumor related rather than stent related. --will give benadryl pre-oxaliplatin as did with cisplatin post an episode of hives with one of the earlier treatments with [place]-Cis (remainder of cycles without adverse reaction)-have discussed with patient/-they have concurred with plans. Treatment RN to monitor closely for s/sx. reaction as per protocol. --5/5/25-5/7/25- s/p hospitalization for RUQ pain i/s/o cholangiocarcinoma. CT A/P noted carcinomatosis with mild ascites, biliary stent in place with mild dilatation of bile ducts. S/p ERCP 5/6 w/ stone extraction and exchange of 2 plastic stents. Empiric zosyn started, BCX NTD.  --5/5/25-CTAP and CTA-IMPRESSION: No pulmonary embolism. Overall increase of peritoneal thickening and nodularity with multiple implants which are increasing in size/number consistent with carcinomatosis. Small to moderate ascites. --5/5/25-Abdominal US-IMPRESSION: Small amount of ascites. Biliary stent --5/9/25-Plt 76k, s/p 1U platelets on 5/10/25. May need platelet transfusion in the future to ensure she receives her chemotherapy treatment given disease progression.  --clinically stable for treatment today for which patient consented  #2) FDG-avid soft tissue nodule in right parotid gland nonspecific on PET/CT scan 5/25/2024.  F/U right parotid US 7/30/2024-The right parotid gland is normal in size and echogenicity. Again noted is 8 x 7 x 5 mm heterogeneous predominantly isoechoic nodularity in the superficial midportion of the parotid gland, unchanged. Findings are nonspecific. Consider follow up ultrasound in 6 months. 9/2024-Saw ENT MD Dr. Cid. 9/30/2024-SALIVARY GLAND, PAROTID, RIGHT INFERIOR, US GUIDED FNA NON DIAGNOSTIC. Benign salivary gland tissue only Resolution of small FDG-avid focus in right parotid region on PET/CT scan 12/24/2024. ---continue f/u with ENT MD   #3) indeterminate FDG-avid focus in fundus of uterus on PET/CT scan 5/25/2024. Pelvic US 7/30/2024-Leiomyomatous uterus. Subcentimeter bilateral simple ovarian cyst. Trace fundal endometrial fluid. Resolution of FDG-avid focus in fundus of uterus on 12/24/2024 PET/CT scan. --has seen GYN MD 1/2024-yearly f/u as planned  #4) mild neuropathy symptoms-PRN gabapentin; foot massage  #5) Ascites (midl to moderate). Symptomatic (distention and uncomfortable) --seen on CT scan on 5/5/25. --draw coags today. Referral to IR for possible paracentesis.   #6) back pain: can take tylenol 1 tab daily PRN. discussed minimize use of tylenol due to transaminitis.  --prior urine cultures negative for UTI.  Patient was given the opportunity to ask questions. Her questions have been answered to her apparent satisfaction at this time. She expressed her understanding and willingness to comply with recommended f/u.  -->FOLFOX-doses now decreased by ~20%. Cycle every 14 days. Neulasta Onpro support. Cycle #4. RTO 2 weeks.

## 2025-05-12 NOTE — ASSESSMENT
[FreeTextEntry1] : Lab work reviewed, 5/5/25-ED note reviewed, 5/5/25-CTAP, CTA, Abdominal US reviewed. #1) GB carcinoma-T4N1(Stage IIIC) clinically. 5/28/2024-PET/CT scan-. FDG-avid soft tissue within the ze hepatis, surrounding common bile duct stent, extending into gallbladder/gallbladder fossa, corresponds to known malignancy. FDG-avid soft tissue nodule in right parotid gland is nonspecific. Differential diagnosis includes benign and malignant salivary gland neoplasms and an intraparotid lymph node. Further evaluation may be performed with ultrasound and percutaneous needle biopsy. Indeterminate FDG-avid focus in fundus of uterus. Pelvic ultrasound/pelvic MRI may be obtained for further evaluation. -**Requested Foundation One, PD-L1 on pathology-insufficient tissue. 7/15/2024-FoundationOne Liquid biopsy-ZHANE, TMB=1 Muts/Mb. ARID1A and TET2 mutations.  No  diagnostic alterations for [employer] liquid CDX were detected. --had reviewed roles of surgery/radiation/systemic therapy in biliary tract cancers-requires a multidisciplinary approach.  5/30/2024-Had discussed case with surgeon Dr. Allison.  He recommended neoadjuvant systemic therapy with interval follow-up imaging at 2 months, and pending this, again at 4 months for surgical decisions.  6/10/2024-Started neoadjuvant Gemcitabine/Cisplatin/Durvalumab.  Course complicated by biliary stent issues, Klebsiella bacteremia. Following treatment of infection, resumed treatment.  With creatinine improved, resumed cisplatin.  Needed to further elucidate liver lesions: 7/2/2024-CT abdomen/pelvis-IMPRESSION: Adequate positioning of the biliary stent, however there is no  pneumobilia to suggest patency. Mild intrahepatic biliary ductal  dilatation. New from 6/4/2024 are ill-defined hypodensities scattered throughout the  bilateral hepatic lobes, suggestive of metastatic disease.  7/3/24-MRI Abdomen-IMPRESSION: 1.  Focal stricture of the common hepatic duct/common biliary duct  measuring 1.6 cm with progressive enhancement, consistent with  cholangiocarcinoma. CBD stent traverse through the focal stricture. Mild  intra and extrahepatic biliary ductal dilatation. 2.  Complex solid and cystic lesion in the gallbladder fossa with areas  of thickened septations/nodular enhancement. 3.  Multiple subcentimeter T2 hyperintense foci throughout the hepatic  parenchyma and few of which in the right hepatic lobe demonstrating  peripheral rim enhancement. Although nonspecific, these are suspicious  for microabscesses.  --?Micro abscesses vs. mets 7/18/2024-MRI Abdomen-1.  Several small right hepatic lobe liver lesions are without significant change from 7/3/2024. While favored to be inflammatory from prior cholangitis (improvement on imaging can lag behind clinical improvement), metastatic disease remains possible. Follow-up MRI abdomen recommended in 6-8 weeks. 2.  Bile duct mass and gallbladder mass are without significant change. 3.  Iron deposition within the liver. 9/4/2024-MRI Abdomen-Resolution of right hepatic lesions, in keeping with infection. Bile duct mass, with associated common hepatic duct narrowing, without change. Slightly increased CBD dilatation with distal tapering. Unchanged intrahepatic biliary duct dilatation. Fundal gallbladder mass, slightly decreased in size. 11/6/2024-CT A/P-Fundal gallbladder mass is slightly decreased in size since 9/4/2024. CBD dilatation, unchanged. Known CBD mass is difficult to visualize on CT.  11/25/2024-Completed 8th cycle Gemcitabine/Cisplatin/Durvalumab.  12/18/2024-MRI abdomen/MRCP-Since September 4, 2024, unchanged residual stricture at biliary ductal confluence and unchanged residual gallbladder tumor. No new suspicious finding. 1/2/2025-PET/CT scan-Compared to FDG PET/CT dated 5/28/2024: 1. FDG-avid lesion in ze hepatis adjacent to biliary stent and FDG-avid focus within the gallbladder, not well delineated on CT, are decreased in size and mildly decreased in metabolism, compatible with a partial response to interval therapy. 2. Resolution of small FDG-avid focus in right parotid region. 3. Resolution of FDG-avid focus in fundus of uterus. 4. No new lesion.  **1/24/2025-Patient was scheduled for an exp lap hepatectomy & bile duct resection - however intra-operatively was noted to have positive peritoneal implants, case was aborted and biopsies taken, path: - peritoneal bx: moderately differentiated adenocarcinoma - right diaphragmatic nodule: moderate to poorly differentiated adenocarcinoma - peritoneal bx #2: moderately differentiated adenocarcinoma PD-L1 TPS 75%, Her 2- (1+), pMMR; Foundation testing pending per path report.  2/26/2025-CT C/A/P-Heterogeneity of the gallbladder fundus again noted. Multiple peritoneal implants consistent with metastatic disease. As patient now with measurable progression of disease on imaging, recommended change in systemic therapy-to consider FOLFOX regimen.   3/3/2025-Began FOLFOX. Note, received message from GI/hepatobiliary research team at [facility] 2/26/2025-they do not have any trial options for this patient at this time. --clinically stable for treatment today if CBC adequate. Have had to hold chemo x 2 weeks due to cytopenias (thrombocytopenia+/- neutropenia)-will now decrease chemo dosage by ~20%-assess tolerance/response-patient/ advised. --continue to monitor LFTs. 4/11/2025 Abdominal US-Known gallbladder malignancy with heterogeneous and ill-defined soft tissue extending from gallbladder into the adjacent liver. Will obtain f/u US now in light of RUQ discomfort. May need t/c palliative RT if felt tumor related rather than stent related. --will give benadryl pre-oxaliplatin as did with cisplatin post an episode of hives with one of the earlier treatments with [place]-Cis (remainder of cycles without adverse reaction)-have discussed with patient/-they have concurred with plans. Treatment RN to monitor closely for s/sx. reaction as per protocol. --5/5/25-5/7/25- s/p hospitalization for RUQ pain i/s/o cholangiocarcinoma. CT A/P noted carcinomatosis with mild ascites, biliary stent in place with mild dilatation of bile ducts. S/p ERCP 5/6 w/ stone extraction and exchange of 2 plastic stents. Empiric zosyn started, BCX NTD.  --5/5/25-CTAP and CTA-IMPRESSION: No pulmonary embolism. Overall increase of peritoneal thickening and nodularity with multiple implants which are increasing in size/number consistent with carcinomatosis. Small to moderate ascites. --5/5/25-Abdominal US-IMPRESSION: Small amount of ascites. Biliary stent --5/9/25-Plt 76k, s/p 1U platelets on 5/10/25. May need platelet transfusion in the future to ensure she receives her chemotherapy treatment given disease progression.  --clinically stable for treatment today for which patient consented  #2) FDG-avid soft tissue nodule in right parotid gland nonspecific on PET/CT scan 5/25/2024.  F/U right parotid US 7/30/2024-The right parotid gland is normal in size and echogenicity. Again noted is 8 x 7 x 5 mm heterogeneous predominantly isoechoic nodularity in the superficial midportion of the parotid gland, unchanged. Findings are nonspecific. Consider follow up ultrasound in 6 months. 9/2024-Saw ENT MD Dr. Cid. 9/30/2024-SALIVARY GLAND, PAROTID, RIGHT INFERIOR, US GUIDED FNA NON DIAGNOSTIC. Benign salivary gland tissue only Resolution of small FDG-avid focus in right parotid region on PET/CT scan 12/24/2024. ---continue f/u with ENT MD   #3) indeterminate FDG-avid focus in fundus of uterus on PET/CT scan 5/25/2024. Pelvic US 7/30/2024-Leiomyomatous uterus. Subcentimeter bilateral simple ovarian cyst. Trace fundal endometrial fluid. Resolution of FDG-avid focus in fundus of uterus on 12/24/2024 PET/CT scan. --has seen GYN MD 1/2024-yearly f/u as planned  #4) mild neuropathy symptoms-PRN gabapentin; foot massage  #5) Ascites (midl to moderate). Symptomatic (distention and uncomfortable) --seen on CT scan on 5/5/25. --draw coags today. Referral to IR for possible paracentesis.   #6) back pain: can take tylenol 1 tab daily PRN. discussed minimize use of tylenol due to transaminitis.  --prior urine cultures negative for UTI.  Patient was given the opportunity to ask questions. Her questions have been answered to her apparent satisfaction at this time. She expressed her understanding and willingness to comply with recommended f/u.  -->FOLFOX-doses now decreased by ~20%. Cycle every 14 days. Neulasta Onpro support. Cycle #4. RTO 2 weeks.

## 2025-05-12 NOTE — ASSESSMENT
[FreeTextEntry1] : Lab work reviewed, 5/5/25-ED note reviewed, 5/5/25-CTAP, CTA, Abdominal US reviewed. #1) GB carcinoma-T4N1(Stage IIIC) clinically. 5/28/2024-PET/CT scan-. FDG-avid soft tissue within the ze hepatis, surrounding common bile duct stent, extending into gallbladder/gallbladder fossa, corresponds to known malignancy. FDG-avid soft tissue nodule in right parotid gland is nonspecific. Differential diagnosis includes benign and malignant salivary gland neoplasms and an intraparotid lymph node. Further evaluation may be performed with ultrasound and percutaneous needle biopsy. Indeterminate FDG-avid focus in fundus of uterus. Pelvic ultrasound/pelvic MRI may be obtained for further evaluation. -**Requested [facility], PD-L1 on pathology-insufficient tissue. 7/15/2024-FoundationOne Liquid biopsy-ZAHNE, TMB=1 Muts/Mb. ARID1A and TET2 mutations.  No  diagnostic alterations for [employer] liquid CDX were detected. --had reviewed roles of surgery/radiation/systemic therapy in biliary tract cancers-requires a multidisciplinary approach.  5/30/2024-Had discussed case with surgeon Dr. Allison.  He recommended neoadjuvant systemic therapy with interval follow-up imaging at 2 months, and pending this, again at 4 months for surgical decisions.  6/10/2024-Started neoadjuvant Gemcitabine/Cisplatin/Durvalumab.  Course complicated by biliary stent issues, Klebsiella bacteremia. Following treatment of infection, resumed treatment.  With creatinine improved, resumed cisplatin.  Needed to further elucidate liver lesions: 7/2/2024-CT abdomen/pelvis-IMPRESSION: Adequate positioning of the biliary stent, however there is no  pneumobilia to suggest patency. Mild intrahepatic biliary ductal  dilatation. New from 6/4/2024 are ill-defined hypodensities scattered throughout the  bilateral hepatic lobes, suggestive of metastatic disease.  7/3/24-MRI Abdomen-IMPRESSION: 1.  Focal stricture of the common hepatic duct/common biliary duct  measuring 1.6 cm with progressive enhancement, consistent with  cholangiocarcinoma. CBD stent traverse through the focal stricture. Mild  intra and extrahepatic biliary ductal dilatation. 2.  Complex solid and cystic lesion in the gallbladder fossa with areas  of thickened septations/nodular enhancement. 3.  Multiple subcentimeter T2 hyperintense foci throughout the hepatic  parenchyma and few of which in the right hepatic lobe demonstrating  peripheral rim enhancement. Although nonspecific, these are suspicious  for microabscesses.  --?Micro abscesses vs. mets 7/18/2024-MRI Abdomen-1.  Several small right hepatic lobe liver lesions are without significant change from 7/3/2024. While favored to be inflammatory from prior cholangitis (improvement on imaging can lag behind clinical improvement), metastatic disease remains possible. Follow-up MRI abdomen recommended in 6-8 weeks. 2.  Bile duct mass and gallbladder mass are without significant change. 3.  Iron deposition within the liver. 9/4/2024-MRI Abdomen-Resolution of right hepatic lesions, in keeping with infection. Bile duct mass, with associated common hepatic duct narrowing, without change. Slightly increased CBD dilatation with distal tapering. Unchanged intrahepatic biliary duct dilatation. Fundal gallbladder mass, slightly decreased in size. 11/6/2024-CT A/P-Fundal gallbladder mass is slightly decreased in size since 9/4/2024. CBD dilatation, unchanged. Known CBD mass is difficult to visualize on CT.  11/25/2024-Completed 8th cycle Gemcitabine/Cisplatin/Durvalumab.  12/18/2024-MRI abdomen/MRCP-Since September 4, 2024, unchanged residual stricture at biliary ductal confluence and unchanged residual gallbladder tumor. No new suspicious finding. 1/2/2025-PET/CT scan-Compared to FDG PET/CT dated 5/28/2024: 1. FDG-avid lesion in ze hepatis adjacent to biliary stent and FDG-avid focus within the gallbladder, not well delineated on CT, are decreased in size and mildly decreased in metabolism, compatible with a partial response to interval therapy. 2. Resolution of small FDG-avid focus in right parotid region. 3. Resolution of FDG-avid focus in fundus of uterus. 4. No new lesion.  **1/24/2025-Patient was scheduled for an exp lap hepatectomy & bile duct resection - however intra-operatively was noted to have positive peritoneal implants, case was aborted and biopsies taken, path: - peritoneal bx: moderately differentiated adenocarcinoma - right diaphragmatic nodule: moderate to poorly differentiated adenocarcinoma - peritoneal bx #2: moderately differentiated adenocarcinoma PD-L1 TPS 75%, Her 2- (1+), pMMR; Foundation testing pending per path report.  2/26/2025-CT C/A/P-Heterogeneity of the gallbladder fundus again noted. Multiple peritoneal implants consistent with metastatic disease. As patient now with measurable progression of disease on imaging, recommended change in systemic therapy-to consider FOLFOX regimen.   3/3/2025-Began FOLFOX. Note, received message from GI/hepatobiliary research team at [facility] 2/26/2025-they do not have any trial options for this patient at this time. --clinically stable for treatment today if CBC adequate. Have had to hold chemo x 2 weeks due to cytopenias (thrombocytopenia+/- neutropenia)-will now decrease chemo dosage by ~20%-assess tolerance/response-patient/ advised. --continue to monitor LFTs. 4/11/2025 Abdominal US-Known gallbladder malignancy with heterogeneous and ill-defined soft tissue extending from gallbladder into the adjacent liver. Will obtain f/u US now in light of RUQ discomfort. May need t/c palliative RT if felt tumor related rather than stent related. --will give benadryl pre-oxaliplatin as did with cisplatin post an episode of hives with one of the earlier treatments with [place]-Cis (remainder of cycles without adverse reaction)-have discussed with patient/-they have concurred with plans. Treatment RN to monitor closely for s/sx. reaction as per protocol. --5/5/25-5/7/25- s/p hospitalization for RUQ pain i/s/o cholangiocarcinoma. CT A/P noted carcinomatosis with mild ascites, biliary stent in place with mild dilatation of bile ducts. S/p ERCP 5/6 w/ stone extraction and exchange of 2 plastic stents. Empiric zosyn started, BCX NTD.  --5/5/25-CTAP and CTA-IMPRESSION: No pulmonary embolism. Overall increase of peritoneal thickening and nodularity with multiple implants which are increasing in size/number consistent with carcinomatosis. Small to moderate ascites. --5/5/25-Abdominal US-IMPRESSION: Small amount of ascites. Biliary stent --5/9/25-Plt 76k, s/p 1U platelets on 5/10/25. May need platelet transfusion in the future to ensure she receives her chemotherapy treatment given disease progression.  --clinically stable for treatment today for which patient consented  #2) FDG-avid soft tissue nodule in right parotid gland nonspecific on PET/CT scan 5/25/2024.  F/U right parotid US 7/30/2024-The right parotid gland is normal in size and echogenicity. Again noted is 8 x 7 x 5 mm heterogeneous predominantly isoechoic nodularity in the superficial midportion of the parotid gland, unchanged. Findings are nonspecific. Consider follow up ultrasound in 6 months. 9/2024-Saw ENT MD Dr. Cid. 9/30/2024-SALIVARY GLAND, PAROTID, RIGHT INFERIOR, US GUIDED FNA NON DIAGNOSTIC. Benign salivary gland tissue only Resolution of small FDG-avid focus in right parotid region on PET/CT scan 12/24/2024. ---continue f/u with ENT MD   #3) indeterminate FDG-avid focus in fundus of uterus on PET/CT scan 5/25/2024. Pelvic US 7/30/2024-Leiomyomatous uterus. Subcentimeter bilateral simple ovarian cyst. Trace fundal endometrial fluid. Resolution of FDG-avid focus in fundus of uterus on 12/24/2024 PET/CT scan. --has seen GYN MD 1/2024-yearly f/u as planned  #4) mild neuropathy symptoms-PRN gabapentin; foot massage  #5) Ascites (midl to moderate). Symptomatic (distention and uncomfortable) --seen on CT scan on 5/5/25. --draw coags today. Referral to IR for possible paracentesis.   #6) back pain: can take tylenol 1 tab daily PRN. discussed minimize use of tylenol due to transaminitis.  --prior urine cultures negative for UTI.  Patient was given the opportunity to ask questions. Her questions have been answered to her apparent satisfaction at this time. She expressed her understanding and willingness to comply with recommended f/u.  -->FOLFOX-doses now decreased by ~20%. Cycle every 14 days. Neulasta Onpro support. Cycle #4. RTO 2 weeks.

## 2025-05-12 NOTE — PHYSICAL EXAM
[Restricted in physically strenuous activity but ambulatory and able to carry out work of a light or sedentary nature] : Status 1- Restricted in physically strenuous activity but ambulatory and able to carry out work of a light or sedentary nature, e.g., light house work, office work [Normal] : affect appropriate [de-identified] : soft, mildly tender to palpation RUQ without rebound

## 2025-05-12 NOTE — PHYSICAL EXAM
[Restricted in physically strenuous activity but ambulatory and able to carry out work of a light or sedentary nature] : Status 1- Restricted in physically strenuous activity but ambulatory and able to carry out work of a light or sedentary nature, e.g., light house work, office work [Normal] : affect appropriate [de-identified] : soft, mildly tender to palpation RUQ without rebound

## 2025-05-12 NOTE — HISTORY OF PRESENT ILLNESS
[Disease: _____________________] : Disease: [unfilled] [de-identified] : 5/2024-Patient presented to [facility] with elevated LFTs. At that time, she also noted progressively darkening urine and pale stools for one week along with postprandial epigastric pain. 5/10/2024-Abdominal ultrasound-moderate intrahepatic and extrahepatic biliary dilatation.  Abnormal appearing gallbladder.  Abnormal soft tissue density within the distal portion of the common bile duct and at the ze hepatitis.  The constellation of findings is most concerning for possible gallbladder neoplasm with extension to the ze habitus and associated biliary obstruction.  5/11/2024-CT abdomen/pelvis-gallbladder mass and soft tissue within the ze hepatis with enlarged portal caval node.  Soft tissue involvement of the biliary ducts and vasculature.  No evidence of metastatic disease within the chest. 5/11/2024 MR/MRCP-there is an approximately 2.2 cm obstructing mass centered at the bifurcation of the common hepatic duct with intrahepatic biliary duct dilatation, highly suspicious for cholangiocarcinoma ([place] tumor).  Masslike appearance of the gallbladder fundus with cystic changes measuring approximately 3.3 cm, which could represent either masslike adenoma I will mitosis versus a separate gallbladder mass.  No evidence of metastatic disease within the abdomen.  5/14/2024-EUS/ERCP 5/14/2024 (Dr. Dangelo) - proximal biliary mass without involvement of the portal vein, large ze hepatic LAD & gallbladder lesion noted - ERCP notable for hilar stricture s/p sphincterotomy, dilation of the stricture to 6 mm, brushing, biopsy, cholangioscopy & PLASTIC stent placed. *repeat in 3 months for stent removal/exchange if no sx done - cholangioscopy notable for abnormal mucosa of the proximal bile duct w/ narrowing and nodularity w/ decreased vascular pattern s/p spy bite bx ** biopsy of CBD structure c/w carcinoma, favor adeno Common bile duct stricture biopsy-small clusters of atypical cells, detached were within stroma, compatible with carcinoma and favor adenocarcinoma. Bile duct mass biopsy-minute fragments of fibrous tissue with marked crush artifact and rare, atypical cells singly or in small clusters.  5/28/2024-PET/CT scan- FDG-avid soft tissue within the ze hepatis, surrounding common bile duct stent, extending into gallbladder/gallbladder fossa, corresponds to known malignancy. FDG-avid soft tissue nodule in right parotid gland is nonspecific. Differential diagnosis includes benign and malignant salivary gland neoplasms and an intraparotid lymph node. Further evaluation may be performed with ultrasound and percutaneous needle biopsy. Indeterminate FDG-avid focus in fundus of uterus. Pelvic ultrasound/pelvic MRI may be obtained for further evaluation.  6/10/2024-Started neoadjuvant Gemcitabine/Cisplatin/Durvalumab.  Course complicated by biliary stent issues, Klebsiella bacteremia (7/1/2024).  7/2/2024-CT abdomen/pelvis-IMPRESSION: Adequate positioning of the biliary stent, however there is no  pneumobilia to suggest patency. Mild intrahepatic biliary ductal  dilatation. New from 6/4/2024 are ill-defined hypodensities scattered throughout the  bilateral hepatic lobes, suggestive of metastatic disease.  7/3/24-MRI Abdomen-IMPRESSION: 1.  Focal stricture of the common hepatic duct/common biliary duct  measuring 1.6 cm with progressive enhancement, consistent with  cholangiocarcinoma. CBD stent traverse through the focal stricture. Mild  intra and extrahepatic biliary ductal dilatation. 2.  Complex solid and cystic lesion in the gallbladder fossa with areas  of thickened septations/nodular enhancement. 3.  Multiple subcentimeter T2 hyperintense foci throughout the hepatic  parenchyma and few of which in the right hepatic lobe demonstrating  peripheral rim enhancement. Although nonspecific, these are suspicious  for microabscesses.  9/4/2024-MRI Abdomen-Resolution of right hepatic lesions, in keeping with infection. Bile duct mass, with associated common hepatic duct narrowing, without change. Slightly increased CBD dilatation with distal tapering. Unchanged intrahepatic biliary duct dilatation. Fundal gallbladder mass, slightly decreased in size. 11/6/2024-CT A/P-Fundal gallbladder mass is slightly decreased in size since 9/4/2024. CBD dilatation, unchanged. Known CBD mass is difficult to visualize on CT.  11/25/2024-Completed 8th cycle Gemcitabine/Cisplatin/Durvalumab.  12/18/2024-MRI abdomen/MRCP-. Since September 4, 2024, unchanged residual stricture at biliary ductal confluence and unchanged residual gallbladder tumor. No new suspicious finding.  1/2/2025-PET/CT scan-Compared to FDG PET/CT dated 5/28/2024: 1. FDG-avid lesion in ze hepatis adjacent to biliary stent and FDG-avid focus within the gallbladder, not well delineated on CT, are decreased in size and mildly decreased in metabolism, compatible with a partial response to interval therapy. 2. Resolution of small FDG-avid focus in right parotid region. 3. Resolution of FDG-avid focus in fundus of uterus. 4. No new lesion.  1/24/2025-Patient was scheduled for an exp lap hepatectomy & bile duct resection - however intra-operatively was noted to have positive peritoneal implants, case was aborted and biopsies taken, path: - peritoneal bx: moderately differentiated adeno - right diaphragmatic nodule: moderate to poorly differentiated adeno - peritoneal bx #2: moderately differentiated adeno PD-L1 TPS 75%, Her 2- (1+), pMMR  2/26/2025-CT C/A/P-Heterogeneity of the gallbladder fundus again noted. Multiple peritoneal implants consistent with metastatic disease. 3/3/2025-Began FOLFOX.  Hospital Course: Discharge Date 07-May-2025 Admission Date 05-May-2025 21:40 Reason for Admission RUQ pain i/s/o cholangiocarcinoma Hospital Course: 74 F with PMH cholangiocarcinoma on FOLFOX (last ~4/2025) S/P biliary stent (last exchanged 4/17) who presents with tachycardia, and RUQ/RLQ abdominal pain over the last 2 weeks. CT A/P noted carcinomatosis celestino mild ascites, biliary stent in place with mild dilatation of bile ducts. GI consulted, s/p ERCP 5/6 w/ stone extraction and exchange of 2 plastic stents. Empiric zosyn started, BCX NTD. Patient tolerating regular diet well, medically cleared for discharge.  05/05/2025: CT ABDOMEN AND PELVIS CT ANGIO CHEST PULM ART [facility]   ORDERED BY: TANJA SIMMONS PROCEDURE: CT Angiography of the Chest was performed followed by portal venous phase imaging of the Abdomen and Pelvis. Sagittal and coronal reformats were performed as well as 3D (MIP) reconstructions.  FINDINGS: CHEST: LUNGS AND LARGE AIRWAYS: Patent central airways. Subsegmental atelectasis in the bilateral lower lobes and lingula. PLEURA: No pleural effusion. VESSELS: No pulmonary embolism. Aortic calcifications. MediPort catheter tip terminates in the SVC. HEART: Heart size is normal. No pericardial effusion. MEDIASTINUM AND DANNY: No lymphadenopathy. CHEST WALL AND LOWER NECK: Right chest wall MediPort.  ABDOMEN AND PELVIS: LIVER: Within normal limits. BILE DUCTS: Mild intrahepatic biliary ductal dilatation. CBD stent is unchanged in position. Pneumobilia is no longer noted. GALLBLADDER: Unchanged appearance of the gallbladder fundus with ill-defined heterogeneity. SPLEEN: Borderline splenomegaly measuring 13.0 cm in craniocaudal dimension. PANCREAS: Within normal limits. ADRENALS: Within normal limits. KIDNEYS/URETERS: Bilateral subcentimeter hypodensities which are too small to characterize. No hydronephrosis.  BLADDER: Within normal limits. REPRODUCTIVE ORGANS: Fibroid uterus with interval increase in heterogeneous enhancement, either reflecting fibroid degeneration or worsening implants in the cul-de-sac now appearing inseparable from the uterus.  BOWEL: No bowel obstruction. Colonic diverticulosis. Appendix is normal. PERITONEUM/RETROPERITONEUM: Small to moderate ascites with peritoneal thickening and overall increase in nodularity, for reference a left anterior nodule currently measuring 1.5 cm (303-71), previously 0.9 cm, and the new right anterior nodule measuring 1.2 cm (303-62) consistent with implants. VESSELS: Within normal limits. LYMPH NODES: No lymphadenopathy. ABDOMINAL WALL: Within normal limits. BONES: Degenerative changes.  IMPRESSION: No pulmonary embolism. Overall increase of peritoneal thickening and nodularity with multiple implants which are increasing in size/number consistent with carcinomatosis. Small to moderate ascites.  5/5/25-Abdominal US-IMPRESSION: Small amount of ascites. Biliary stent     [de-identified] : Adenocarcinoma [de-identified] : 5/21/2024-CA [id number] [de-identified] : 7/15/2024-[facility] Liquid biopsy-ZHANE, TMB=1 Muts/Mb. ARID1A and TET2 mutations.  No  diagnostic alterations for [facility] liquid CDX were detected. [de-identified] : S/p hospitalization (5/5/25-5/7/25). S/p ERCP 5/6 w/ stone extraction and exchange of 2 plastic stents.  S/p 1unit platelet on 5/10/25. Reports abdominal distention since d/c from the hospital. She is eating-gets full easily. No N/V/D. Takes Tylenol 500mg at night for back pain. h/o Mild feet neuropathy. No falls. No difficulty walking or dropping things. Not taking gabapentin for this. [facility] No chest pain, SOB. No melena, bleeding reported.  Notes thinks had transient hives with 2nd or 3rd cycle of [place]/Cis chemo-received benadryl pre-remainder of cycles with no adverse reaction.

## 2025-05-12 NOTE — ASSESSMENT
[FreeTextEntry1] : 74F with cholangiocarcinoma on FOLFOX s/p biliary stent (exchanged 4/17) presenting with abd pain. A CT scan showed increasing peritoneal thickening and nodularity consistent with carcinomatosis, ascites and mild dilatation of biliary ducts.  She underwent an ERCP on 5/6 with stone extraction and exchange of 2 plastic stents.  Her bilirubin improved.  She as on abx briefly but all cultures were negative.   Pt is receiving chemo infusion during the appointment.  She states she feels well right now.  She does note the abd is increasing in size and she feels a heaviness there.  She is only able to tolerate small frequent meals - denies n/v.  She is using Tylenol for pain.  She is urinary frequently due to abdominal pressure.  Pt states she is being scheduled for a para to remove the fluid. She is currently not taking any medications daily such as gabapentin and zofran.

## 2025-05-12 NOTE — PHYSICAL EXAM
[No Acute Distress] : no acute distress [Well Nourished] : well nourished [Normal Voice/Communication] : normal voice/communication [Normal Sclera/Conjunctiva] : normal sclera/conjunctiva [EOMI] : extraocular movements intact [Normal Outer Ear/Nose] : the outer ears and nose were normal in appearance [No Respiratory Distress] : no respiratory distress  [Memory Grossly Normal] : memory grossly normal [Normal Insight/Judgement] : insight and judgment were intact [12799 - Moderate Complexity requires multiple possible diagnoses and/or the management options, moderate complexity of the medical data (tests, etc.) to be reviewed, and moderate risk of significant complications, morbidity, and/or mortality as well as co] : Moderate Complexity

## 2025-05-28 NOTE — ASSESSMENT
[FreeTextEntry1] : Lab work reviewed, 5/5/25-ED note reviewed, 5/5/25-CTAP, CTA, Abdominal US reviewed. #1) GB carcinoma-T4N1(Stage IIIC) clinically. 5/28/2024-PET/CT scan-. FDG-avid soft tissue within the ze hepatis, surrounding common bile duct stent, extending into gallbladder/gallbladder fossa, corresponds to known malignancy. FDG-avid soft tissue nodule in right parotid gland is nonspecific. Differential diagnosis includes benign and malignant salivary gland neoplasms and an intraparotid lymph node. Further evaluation may be performed with ultrasound and percutaneous needle biopsy. Indeterminate FDG-avid focus in fundus of uterus. Pelvic ultrasound/pelvic MRI may be obtained for further evaluation. -**Requested Foundation One, PD-L1 on pathology-insufficient tissue. 7/15/2024-FoundationOne Liquid biopsy-ZHANE, TMB=1 Muts/Mb. ARID1A and TET2 mutations.  No  diagnostic alterations for Breadcrumbtracking liquid CDX were detected. --had reviewed roles of surgery/radiation/systemic therapy in biliary tract cancers-requires a multidisciplinary approach.  5/30/2024-Had discussed case with surgeon Dr. Allison.  He recommended neoadjuvant systemic therapy with interval follow-up imaging at 2 months, and pending this, again at 4 months for surgical decisions.  6/10/2024-Started neoadjuvant Gemcitabine/Cisplatin/Durvalumab.  Course complicated by biliary stent issues, Klebsiella bacteremia. Following treatment of infection, resumed treatment.  With creatinine improved, resumed cisplatin.  Needed to further elucidate liver lesions: 7/2/2024-CT abdomen/pelvis-IMPRESSION: Adequate positioning of the biliary stent, however there is no  pneumobilia to suggest patency. Mild intrahepatic biliary ductal  dilatation. New from 6/4/2024 are ill-defined hypodensities scattered throughout the  bilateral hepatic lobes, suggestive of metastatic disease.  7/3/24-MRI Abdomen-IMPRESSION: 1.  Focal stricture of the common hepatic duct/common biliary duct  measuring 1.6 cm with progressive enhancement, consistent with  cholangiocarcinoma. CBD stent traverse through the focal stricture. Mild  intra and extrahepatic biliary ductal dilatation. 2.  Complex solid and cystic lesion in the gallbladder fossa with areas  of thickened septations/nodular enhancement. 3.  Multiple subcentimeter T2 hyperintense foci throughout the hepatic  parenchyma and few of which in the right hepatic lobe demonstrating  peripheral rim enhancement. Although nonspecific, these are suspicious  for microabscesses.  --?Micro abscesses vs. mets 7/18/2024-MRI Abdomen-1.  Several small right hepatic lobe liver lesions are without significant change from 7/3/2024. While favored to be inflammatory from prior cholangitis (improvement on imaging can lag behind clinical improvement), metastatic disease remains possible. Follow-up MRI abdomen recommended in 6-8 weeks. 2.  Bile duct mass and gallbladder mass are without significant change. 3.  Iron deposition within the liver. 9/4/2024-MRI Abdomen-Resolution of right hepatic lesions, in keeping with infection. Bile duct mass, with associated common hepatic duct narrowing, without change. Slightly increased CBD dilatation with distal tapering. Unchanged intrahepatic biliary duct dilatation. Fundal gallbladder mass, slightly decreased in size. 11/6/2024-CT A/P-Fundal gallbladder mass is slightly decreased in size since 9/4/2024. CBD dilatation, unchanged. Known CBD mass is difficult to visualize on CT.  11/25/2024-Completed 8th cycle Gemcitabine/Cisplatin/Durvalumab.  12/18/2024-MRI abdomen/MRCP-Since September 4, 2024, unchanged residual stricture at biliary ductal confluence and unchanged residual gallbladder tumor. No new suspicious finding. 1/2/2025-PET/CT scan-Compared to FDG PET/CT dated 5/28/2024: 1. FDG-avid lesion in ze hepatis adjacent to biliary stent and FDG-avid focus within the gallbladder, not well delineated on CT, are decreased in size and mildly decreased in metabolism, compatible with a partial response to interval therapy. 2. Resolution of small FDG-avid focus in right parotid region. 3. Resolution of FDG-avid focus in fundus of uterus. 4. No new lesion.  **1/24/2025-Patient was scheduled for an exp lap hepatectomy & bile duct resection - however intra-operatively was noted to have positive peritoneal implants, case was aborted and biopsies taken, path: - peritoneal bx: moderately differentiated adenocarcinoma - right diaphragmatic nodule: moderate to poorly differentiated adenocarcinoma - peritoneal bx #2: moderately differentiated adenocarcinoma PD-L1 TPS 75%, Her 2- (1+), pMMR; Foundation testing pending per path report.  2/26/2025-CT C/A/P-Heterogeneity of the gallbladder fundus again noted. Multiple peritoneal implants consistent with metastatic disease. As patient now with measurable progression of disease on imaging, recommended change in systemic therapy-to consider FOLFOX regimen.   3/3/2025-Began FOLFOX. Note, received message from GI/hepatobiliary research team at [facility] 2/26/2025-they do not have any trial options for this patient at this time. --clinically stable for treatment today if CBC adequate. Have had to hold chemo x 2 weeks due to cytopenias (thrombocytopenia+/- neutropenia)-will now decrease chemo dosage by ~20%-assess tolerance/response-patient/ advised. --continue to monitor LFTs. 4/11/2025 Abdominal US-Known gallbladder malignancy with heterogeneous and ill-defined soft tissue extending from gallbladder into the adjacent liver. Will obtain f/u US now in light of RUQ discomfort. May need t/c palliative RT if felt tumor related rather than stent related. --will give benadryl pre-oxaliplatin as did with cisplatin post an episode of hives with one of the earlier treatments with [place]-Cis (remainder of cycles without adverse reaction)-have discussed with patient/-they have concurred with plans. Treatment RN to monitor closely for s/sx. reaction as per protocol. --5/5/25-5/7/25- s/p hospitalization for RUQ pain i/s/o cholangiocarcinoma. CT A/P noted carcinomatosis with mild ascites, biliary stent in place with mild dilatation of bile ducts. S/p ERCP 5/6 w/ stone extraction and exchange of 2 plastic stents. Empiric zosyn started, BCX NTD.  --5/5/25-CTAP and CTA-IMPRESSION: No pulmonary embolism. Overall increase of peritoneal thickening and nodularity with multiple implants which are increasing in size/number consistent with carcinomatosis. Small to moderate ascites. --5/5/25-Abdominal US-IMPRESSION: Small amount of ascites. Biliary stent --5/9/25-Plt 76k, s/p 1U platelets on 5/10/25. May need platelet transfusion in the future to ensure she receives her chemotherapy treatment given disease progression.  --clinically stable, cbc okay for treatment today  #2) FDG-avid soft tissue nodule in right parotid gland nonspecific on PET/CT scan 5/25/2024.  F/U right parotid US 7/30/2024-The right parotid gland is normal in size and echogenicity. Again noted is 8 x 7 x 5 mm heterogeneous predominantly isoechoic nodularity in the superficial midportion of the parotid gland, unchanged. Findings are nonspecific. Consider follow up ultrasound in 6 months. 9/2024-Saw ENT MD Dr. Cid. 9/30/2024-SALIVARY GLAND, PAROTID, RIGHT INFERIOR, US GUIDED FNA NON DIAGNOSTIC. Benign salivary gland tissue only Resolution of small FDG-avid focus in right parotid region on PET/CT scan 12/24/2024. ---continue f/u with ENT MD   #3) indeterminate FDG-avid focus in fundus of uterus on PET/CT scan 5/25/2024. Pelvic US 7/30/2024-Leiomyomatous uterus. Subcentimeter bilateral simple ovarian cyst. Trace fundal endometrial fluid. Resolution of FDG-avid focus in fundus of uterus on 12/24/2024 PET/CT scan. --has seen GYN MD 1/2024-yearly f/u as planned  #4) mild neuropathy symptoms-PRN gabapentin; foot massage  #5) Ascites (midl to moderate). Symptomatic (distention and uncomfortable) --seen on CT scan on 5/5/25. --Saw IR not enough fluid for paracentesis.  --advised to take famotidine at night to see if helps with some of her discomfort.  Advised that most of her discomfort is most likely disease related.  #6) back pain: can take tylenol 1 tab daily PRN. discussed minimize use of tylenol due to transaminitis.  --prior urine cultures negative for UTI.  Patient was given the opportunity to ask questions. Her questions have been answered to her apparent satisfaction at this time. She expressed her understanding and willingness to comply with recommended f/u.  -->FOLFOX-doses now decreased by ~20%. Cycle every 14 days. Neulasta Onpro support. Cycle #5. RTO 2 weeks.

## 2025-05-28 NOTE — REVIEW OF SYSTEMS
[Palpitations] : palpitations [Diarrhea: Grade 0] : Diarrhea: Grade 0 [Negative] : Allergic/Immunologic [Fever] : no fever [Chest Pain] : no chest pain [Lower Ext Edema] : no lower extremity edema [FreeTextEntry7] : feels distended [FreeTextEntry9] : back pain

## 2025-05-28 NOTE — HISTORY OF PRESENT ILLNESS
[Disease: _____________________] : Disease: [unfilled] [de-identified] : 5/2024-Patient presented to [facility] with elevated LFTs. At that time, she also noted progressively darkening urine and pale stools for one week along with postprandial epigastric pain. 5/10/2024-Abdominal ultrasound-moderate intrahepatic and extrahepatic biliary dilatation.  Abnormal appearing gallbladder.  Abnormal soft tissue density within the distal portion of the common bile duct and at the ze hepatitis.  The constellation of findings is most concerning for possible gallbladder neoplasm with extension to the ze habitus and associated biliary obstruction.  5/11/2024-CT abdomen/pelvis-gallbladder mass and soft tissue within the ze hepatis with enlarged portal caval node.  Soft tissue involvement of the biliary ducts and vasculature.  No evidence of metastatic disease within the chest. 5/11/2024 MR/MRCP-there is an approximately 2.2 cm obstructing mass centered at the bifurcation of the common hepatic duct with intrahepatic biliary duct dilatation, highly suspicious for cholangiocarcinoma ([place] tumor).  Masslike appearance of the gallbladder fundus with cystic changes measuring approximately 3.3 cm, which could represent either masslike adenoma I will mitosis versus a separate gallbladder mass.  No evidence of metastatic disease within the abdomen.  5/14/2024-EUS/ERCP 5/14/2024 (Dr. Dangelo) - proximal biliary mass without involvement of the portal vein, large ze hepatic LAD & gallbladder lesion noted - ERCP notable for hilar stricture s/p sphincterotomy, dilation of the stricture to 6 mm, brushing, biopsy, cholangioscopy & PLASTIC stent placed. *repeat in 3 months for stent removal/exchange if no sx done - cholangioscopy notable for abnormal mucosa of the proximal bile duct w/ narrowing and nodularity w/ decreased vascular pattern s/p spy bite bx ** biopsy of CBD structure c/w carcinoma, favor adeno Common bile duct stricture biopsy-small clusters of atypical cells, detached were within stroma, compatible with carcinoma and favor adenocarcinoma. Bile duct mass biopsy-minute fragments of fibrous tissue with marked crush artifact and rare, atypical cells singly or in small clusters.  5/28/2024-PET/CT scan- FDG-avid soft tissue within the ze hepatis, surrounding common bile duct stent, extending into gallbladder/gallbladder fossa, corresponds to known malignancy. FDG-avid soft tissue nodule in right parotid gland is nonspecific. Differential diagnosis includes benign and malignant salivary gland neoplasms and an intraparotid lymph node. Further evaluation may be performed with ultrasound and percutaneous needle biopsy. Indeterminate FDG-avid focus in fundus of uterus. Pelvic ultrasound/pelvic MRI may be obtained for further evaluation.  6/10/2024-Started neoadjuvant Gemcitabine/Cisplatin/Durvalumab.  Course complicated by biliary stent issues, Klebsiella bacteremia (7/1/2024).  7/2/2024-CT abdomen/pelvis-IMPRESSION: Adequate positioning of the biliary stent, however there is no  pneumobilia to suggest patency. Mild intrahepatic biliary ductal  dilatation. New from 6/4/2024 are ill-defined hypodensities scattered throughout the  bilateral hepatic lobes, suggestive of metastatic disease.  7/3/24-MRI Abdomen-IMPRESSION: 1.  Focal stricture of the common hepatic duct/common biliary duct  measuring 1.6 cm with progressive enhancement, consistent with  cholangiocarcinoma. CBD stent traverse through the focal stricture. Mild  intra and extrahepatic biliary ductal dilatation. 2.  Complex solid and cystic lesion in the gallbladder fossa with areas  of thickened septations/nodular enhancement. 3.  Multiple subcentimeter T2 hyperintense foci throughout the hepatic  parenchyma and few of which in the right hepatic lobe demonstrating  peripheral rim enhancement. Although nonspecific, these are suspicious  for microabscesses.  9/4/2024-MRI Abdomen-Resolution of right hepatic lesions, in keeping with infection. Bile duct mass, with associated common hepatic duct narrowing, without change. Slightly increased CBD dilatation with distal tapering. Unchanged intrahepatic biliary duct dilatation. Fundal gallbladder mass, slightly decreased in size. 11/6/2024-CT A/P-Fundal gallbladder mass is slightly decreased in size since 9/4/2024. CBD dilatation, unchanged. Known CBD mass is difficult to visualize on CT.  11/25/2024-Completed 8th cycle Gemcitabine/Cisplatin/Durvalumab.  12/18/2024-MRI abdomen/MRCP-. Since September 4, 2024, unchanged residual stricture at biliary ductal confluence and unchanged residual gallbladder tumor. No new suspicious finding.  1/2/2025-PET/CT scan-Compared to FDG PET/CT dated 5/28/2024: 1. FDG-avid lesion in ze hepatis adjacent to biliary stent and FDG-avid focus within the gallbladder, not well delineated on CT, are decreased in size and mildly decreased in metabolism, compatible with a partial response to interval therapy. 2. Resolution of small FDG-avid focus in right parotid region. 3. Resolution of FDG-avid focus in fundus of uterus. 4. No new lesion.  1/24/2025-Patient was scheduled for an exp lap hepatectomy & bile duct resection - however intra-operatively was noted to have positive peritoneal implants, case was aborted and biopsies taken, path: - peritoneal bx: moderately differentiated adeno - right diaphragmatic nodule: moderate to poorly differentiated adeno - peritoneal bx #2: moderately differentiated adeno PD-L1 TPS 75%, Her 2- (1+), pMMR  2/26/2025-CT C/A/P-Heterogeneity of the gallbladder fundus again noted. Multiple peritoneal implants consistent with metastatic disease. 3/3/2025-Began FOLFOX.  Hospital Course: Discharge Date 07-May-2025 Admission Date 05-May-2025 21:40 Reason for Admission RUQ pain i/s/o cholangiocarcinoma Hospital Course: 74 F with PMH cholangiocarcinoma on FOLFOX (last ~4/2025) S/P biliary stent (last exchanged 4/17) who presents with tachycardia, and RUQ/RLQ abdominal pain over the last 2 weeks. CT A/P noted carcinomatosis celestino mild ascites, biliary stent in place with mild dilatation of bile ducts. GI consulted, s/p ERCP 5/6 w/ stone extraction and exchange of 2 plastic stents. Empiric zosyn started, BCX NTD. Patient tolerating regular diet well, medically cleared for discharge.  05/05/2025: CT ABDOMEN AND PELVIS CT ANGIO CHEST PULM ART [facility]   ORDERED BY: TANJA SIMMONS PROCEDURE: CT Angiography of the Chest was performed followed by portal venous phase imaging of the Abdomen and Pelvis. Sagittal and coronal reformats were performed as well as 3D (MIP) reconstructions.  FINDINGS: CHEST: LUNGS AND LARGE AIRWAYS: Patent central airways. Subsegmental atelectasis in the bilateral lower lobes and lingula. PLEURA: No pleural effusion. VESSELS: No pulmonary embolism. Aortic calcifications. MediPort catheter tip terminates in the SVC. HEART: Heart size is normal. No pericardial effusion. MEDIASTINUM AND DANNY: No lymphadenopathy. CHEST WALL AND LOWER NECK: Right chest wall MediPort.  ABDOMEN AND PELVIS: LIVER: Within normal limits. BILE DUCTS: Mild intrahepatic biliary ductal dilatation. CBD stent is unchanged in position. Pneumobilia is no longer noted. GALLBLADDER: Unchanged appearance of the gallbladder fundus with ill-defined heterogeneity. SPLEEN: Borderline splenomegaly measuring 13.0 cm in craniocaudal dimension. PANCREAS: Within normal limits. ADRENALS: Within normal limits. KIDNEYS/URETERS: Bilateral subcentimeter hypodensities which are too small to characterize. No hydronephrosis.  BLADDER: Within normal limits. REPRODUCTIVE ORGANS: Fibroid uterus with interval increase in heterogeneous enhancement, either reflecting fibroid degeneration or worsening implants in the cul-de-sac now appearing inseparable from the uterus.  BOWEL: No bowel obstruction. Colonic diverticulosis. Appendix is normal. PERITONEUM/RETROPERITONEUM: Small to moderate ascites with peritoneal thickening and overall increase in nodularity, for reference a left anterior nodule currently measuring 1.5 cm (303-71), previously 0.9 cm, and the new right anterior nodule measuring 1.2 cm (303-62) consistent with implants. VESSELS: Within normal limits. LYMPH NODES: No lymphadenopathy. ABDOMINAL WALL: Within normal limits. BONES: Degenerative changes.  IMPRESSION: No pulmonary embolism. Overall increase of peritoneal thickening and nodularity with multiple implants which are increasing in size/number consistent with carcinomatosis. Small to moderate ascites.  5/5/25-Abdominal US-IMPRESSION: Small amount of ascites. Biliary stent     [de-identified] : Adenocarcinoma [de-identified] : 5/21/2024-CA 19-9=290 [de-identified] : 7/15/2024-FoundationOne Liquid biopsy-ZHANE, TMB=1 Muts/Mb. ARID1A and TET2 mutations.  No  diagnostic alterations for FoundationOne liquid CDX were detected. [de-identified] : S/p hospitalization (5/5/25-5/7/25). S/p ERCP 5/6 w/ stone extraction and exchange of 2 plastic stents.  S/p 1unit platelet on 5/10/25. S/p C4 FOLFOX Reports feeling weak with abdominal discomfort and bloating. She is eating small amounts because gets full easily. No N/V/D.  Complains of occasional constipation.   She met with IR and there was not enough fluid in abd to be drained.   Takes Tylenol 500mg at night for back pain. h/o Mild feet neuropathy. No falls. No difficulty walking or dropping things. Not taking gabapentin for this. Denies chest pain, cough SOB.

## 2025-05-28 NOTE — PHYSICAL EXAM
[Restricted in physically strenuous activity but ambulatory and able to carry out work of a light or sedentary nature] : Status 1- Restricted in physically strenuous activity but ambulatory and able to carry out work of a light or sedentary nature, e.g., light house work, office work [Normal] : affect appropriate [de-identified] : mildline incision healed well, soft, mildly tender to palpation RUQ without rebound

## 2025-05-30 NOTE — DATA REVIEWED
[FreeTextEntry1] : 	 EXAM: [id number] - US ABDOMEN LIMITED  - ORDERED BY: JUAN ROQUE   PROCEDURE DATE:  05/16/2025    INTERPRETATION:  Clinical indications: Abdominal distention. Patient is referred for paracentesis.  IMPRESSION:  Initial abdominal ultrasound demonstrates only small amount of ascites.  Paracentesis was not performed.  --- End of Report ---   	 EXAM: [id number] - PETCT SKUL-THI ONC FDG SUBS  - ORDERED BY: OLAYINKA ONEAL   PROCEDURE DATE:  01/02/2025    INTERPRETATION:  CLINICAL INFORMATION: Preoperative evaluation of cholangiocarcinoma status post neoadjuvant chemotherapy.  TREATMENT STRATEGY EVALUATION: Subsequent AREA IMAGED: Skull base-to-thigh FASTING BLOOD SUGAR: 89 mg/dl RADIOPHARMACEUTICAL: 9.90 mCi F-18 FDG, I.V. I.V. SITE: antecubital left UPTAKE PERIOD: 52 min SCANNER: [employer] Treatment [employer] Fishing Gen2 ORAL CONTRAST: Omnipaque 300 PHARMACOLOGIC INTERVENTION: None.  TECHNIQUE: Following intravenous injection of above radiopharmaceutical and uptake period, PET/CT was performed. CT protocol was optimized for PET attenuation correction and anatomic localization and was not designed to produce and cannot replace state-of-the-art diagnostic CT images with specific imaging protocols for different body parts and indications. Images were reconstructed and reviewed in axial, coronal and sagittal views and three-dimensional MIP.  The standardized uptake values (SUV) are normalized to patient body weight and indicate the highest activity concentration (SUVmax) in a given site. All image numbers refer to axial image number.  COMPARISON:  FDG PET/CT dated 5/28/2024  OTHER STUDIES USED FOR CORRELATION: CT abdomen and pelvis dated 12/24/2024 and 11/6/2024, MRI of abdomen dated 12/18/2024, and pelvic ultrasound dated 6/5/2024  FINDINGS:  HEAD/NECK: Physiologic FDG activity in the visualized portions of the brain, head, and neck. Resolution of FDG-avid right periparotid focus.  THORAX: No abnormal FDG activity. No lymphadenopathy. A Mediport is present in the right anterior chest wall with tip of catheter in superior vena cava.  LUNGS: No abnormal FDG activity. No nodule.  PLEURA/PERICARDIUM: No abnormal FDG activity. No effusion.  HEPATOBILIARY/PANCREAS: Physiologic FDG activity in the liver which demonstrates SUV mean 3.0; previously 3.3. Pneumobilia. Mild intrahepatic  biliary duct dilatation and two biliary stents extending from the right main duct to the duodenum, similar in appearance as compared to recent CT.  Difficult to delineate FDG-avid focus within the ze hepatis adjacent to biliary stent is decreased in size and is mildly decreased in metabolism (SUV 11.6; image 152; previous SUV 14.1).  FDG-avid focus in the fundus of the gallbladder, better delineated on CT, is decreased in size and is mildly decreased in metabolism (SUV 9.3; image 157; previous SUV 13.3).  SPLEEN: Physiologic FDG activity. Normal in size.  ADRENAL GLANDS: No abnormal FDG activity. No nodule.  KIDNEYS/URINARY BLADDER: Physiologic excreted FDG activity.  REPRODUCTIVE ORGANS: No abnormal FDG activity. Resolution of FDG-avid focus in fundus of uterus, with fibroids noted on interval ultrasound.  FDG-avid focus, fundus of uterus, not well delineated on CT (SUV 7.2; image 253).  ABDOMINOPELVIC LYMPH NODES/RETROPERITONEUM: No enlarged or FDG-avid lymph node.  ESOPHAGUS/STOMACH/BOWEL/PERITONEUM/MESENTERY: No abnormal FDG activity.  VESSELS: Within normal limits.  BONES/SOFT TISSUES: No abnormal FDG activity.  IMPRESSION: Compared to FDG PET/CT dated 5/28/2024:  1. FDG-avid lesion in ze hepatis adjacent to biliary stent and FDG-avid focus within the gallbladder, not well delineated on CT, are decreased in size and mildly decreased in metabolism, compatible with a partial response to interval therapy.  2. Resolution of small FDG-avid focus in right parotid region.  3. Resolution of FDG-avid focus in fundus of uterus.  4. No new lesion.  --- End of Report ---

## 2025-05-30 NOTE — DATA REVIEWED
[FreeTextEntry1] : 	 EXAM: [id number] - US ABDOMEN LIMITED  - ORDERED BY: JUAN ROQUE   PROCEDURE DATE:  05/16/2025    INTERPRETATION:  Clinical indications: Abdominal distention. Patient is referred for paracentesis.  IMPRESSION:  Initial abdominal ultrasound demonstrates only small amount of ascites.  Paracentesis was not performed.  --- End of Report ---   	 EXAM: [id number] - PETCT SKUL-THI ONC FDG SUBS  - ORDERED BY: OLAYINKA ONEAL   PROCEDURE DATE:  01/02/2025    INTERPRETATION:  CLINICAL INFORMATION: Preoperative evaluation of cholangiocarcinoma status post neoadjuvant chemotherapy.  TREATMENT STRATEGY EVALUATION: Subsequent AREA IMAGED: Skull base-to-thigh FASTING BLOOD SUGAR: 89 mg/dl RADIOPHARMACEUTICAL: 9.90 mCi F-18 FDG, I.V. I.V. 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  TECHNIQUE: Following intravenous injection of above radiopharmaceutical and uptake period, PET/CT was performed. CT protocol was optimized for PET attenuation correction and anatomic localization and was not designed to produce and cannot replace state-of-the-art diagnostic CT images with specific imaging protocols for different body parts and indications. Images were reconstructed and reviewed in axial, coronal and sagittal views and three-dimensional MIP.  The standardized uptake values (SUV) are normalized to patient body weight and indicate the highest activity concentration (SUVmax) in a given site. All image numbers refer to axial image number.  COMPARISON:  FDG PET/CT dated 5/28/2024  OTHER STUDIES USED FOR CORRELATION: CT abdomen and pelvis dated 12/24/2024 and 11/6/2024, MRI of abdomen dated 12/18/2024, and pelvic ultrasound dated 6/5/2024  FINDINGS:  HEAD/NECK: Physiologic FDG activity in the visualized portions of the brain, head, and neck. Resolution of FDG-avid right periparotid focus.  THORAX: No abnormal FDG activity. No lymphadenopathy. A Mediport is present in the right anterior chest wall with tip of catheter in superior vena cava.  LUNGS: No abnormal FDG activity. No nodule.  PLEURA/PERICARDIUM: No abnormal FDG activity. No effusion.  HEPATOBILIARY/PANCREAS: Physiologic FDG activity in the liver which demonstrates SUV mean 3.0; previously 3.3. Pneumobilia. Mild intrahepatic  biliary duct dilatation and two biliary stents extending from the right main duct to the duodenum, similar in appearance as compared to recent CT.  Difficult to delineate FDG-avid focus within the ze hepatis adjacent to biliary stent is decreased in size and is mildly decreased in metabolism (SUV 11.6; image 152; previous SUV 14.1).  FDG-avid focus in the fundus of the gallbladder, better delineated on CT, is decreased in size and is mildly decreased in metabolism (SUV 9.3; image 157; previous SUV 13.3).  SPLEEN: Physiologic FDG activity. Normal in size.  ADRENAL GLANDS: No abnormal FDG activity. No nodule.  KIDNEYS/URINARY BLADDER: Physiologic excreted FDG activity.  REPRODUCTIVE ORGANS: No abnormal FDG activity. Resolution of FDG-avid focus in fundus of uterus, with fibroids noted on interval ultrasound.  FDG-avid focus, fundus of uterus, not well delineated on CT (SUV 7.2; image 253).  ABDOMINOPELVIC LYMPH NODES/RETROPERITONEUM: No enlarged or FDG-avid lymph node.  ESOPHAGUS/STOMACH/BOWEL/PERITONEUM/MESENTERY: No abnormal FDG activity.  VESSELS: Within normal limits.  BONES/SOFT TISSUES: No abnormal FDG activity.  IMPRESSION: Compared to FDG PET/CT dated 5/28/2024:  1. FDG-avid lesion in ze hepatis adjacent to biliary stent and FDG-avid focus within the gallbladder, not well delineated on CT, are decreased in size and mildly decreased in metabolism, compatible with a partial response to interval therapy.  2. Resolution of small FDG-avid focus in right parotid region.  3. Resolution of FDG-avid focus in fundus of uterus.  4. No new lesion.  --- End of Report ---

## 2025-05-30 NOTE — ASSESSMENT
[FreeTextEntry1] : 73 yo F with:   #Cholangiocarcinoma - Tx per medonc  # Pain - Abdominal distention with indigesiton when eating 2/2 malignancy - Tylenol 500mg - 1 tab q8h prn mild-mod pain - 5/27 ast/alt 25/14 - Oxycodone IR 5mg- 0.5-1 tab q4-6 hr prn severe pain - Pepcid/Tums daily - Heating pad/massage for low back pain - Counseled on importance of maintaining bowel regularity in light of regular opioid use.  # Decreased appetite - Adjustment in pain and constipation regimen as noted - Discussed frequent, small meals every 2 hours  # Constipation - Start senna 4 tabs today, then 2 tabs at bedtime  # Encounter for Palliative Care - Explained the role of palliative care in enhancing quality of life in the setting of serious illness. Emotional support provided.     Follow up in 2 weeks. Instructed to call office with any questions or concerns.

## 2025-05-30 NOTE — HISTORY OF PRESENT ILLNESS
[FreeTextEntry1] : 73 yo F with cholangiocarcinoma presents for initial palliative care visit, referred by oncology.   PMH significant for ascites, arthritis, HLD, uterine leiomyoma, nephrolithiasis, OA, osteopenia, ovarian cysts, palpitations, parotid mass, pre-DM, SOBOE, diverticulosis, L hydronephrosis,   5/2024-Patient presented to [facility] with elevated LFTs. At that time, she also noted progressively darkening urine and pale stools for one week along with postprandial epigastric pain. 5/10/2024-Abdominal ultrasound-moderate intrahepatic and extrahepatic biliary dilatation. Abnormal appearing gallbladder. Abnormal soft tissue density within the distal portion of the common bile duct and at the ze hepatitis. The constellation of findings is most concerning for possible gallbladder neoplasm with extension to the ze habitus and associated biliary obstruction.  5/11/2024-CT abdomen/pelvis-gallbladder mass and soft tissue within the ze hepatis with enlarged portal caval node. Soft tissue involvement of the biliary ducts and vasculature. No evidence of metastatic disease within the chest. 5/11/2024 MR/MRCP-there is an approximately 2.2 cm obstructing mass centered at the bifurcation of the common hepatic duct with intrahepatic biliary duct dilatation, highly suspicious for cholangiocarcinoma (Klatskin tumor). Masslike appearance of the gallbladder fundus with cystic changes measuring approximately 3.3 cm, which could represent either masslike adenoma I will mitosis versus a separate gallbladder mass. No evidence of metastatic disease within the abdomen.  Disease: Cholangiocarcinoma/GB   Pathology: Adenocarcinoma   Tumor Markers: 5/21/2024-CA 19-9=290   7/15/2024-[employer] Liquid biopsy-ZHANE, TMB=1 Muts/Mb. ARID1A and TET2 mutations. No  diagnostic alterations for [employer] liquid CDX were detected.  Interval History: S/p hospitalization (5/5/25-5/7/25). S/p ERCP 5/6 w/ stone extraction and exchange of 2 plastic stents. S/p 1unit platelet on 5/10/25. S/p C4 FOLFOX Reports feeling weak with abdominal discomfort and bloating. She is eating small amounts because gets full easily. No N/V/D. Complains of occasional constipation. She met with IR and there was not enough fluid in abd to be drained.  Takes Tylenol 500mg at night for back pain. h/o Mild feet neuropathy. No falls. No difficulty walking or dropping things. Not taking gabapentin for this. Denies chest pain, cough SOB. ----------------------------------------------------------------------------------------------------------------------------------------- Pt was referred to supportive oncology for symptom management. Accompanied by  Dejan.   Pain: Admits to abdominal discomfort, described as tightness and cramping. Worsened by eating.  Discomfort is intermittent. Pain gets to 4-5/10, reduces to 1/10. When pain is 3/10 or above, difficult to eat and she lays down until it subsides. She previously was taking Tums and pepcid with relief, but over the last 2 days it hasn't been helping as much.  Gets some low back pain which she uses a heating pad / massage.  Constipation/Diarrhea: Admits to constipation, had small BM 4 days ago. Taking stool softener as needed, started 2 days ago. Has not been using Tylenol because she was told it isn't good for her liver.  Used to take gabapentin for neuropathy but has not taken it recently.  Nausea/Vomiting: Gets nausea if she tries to eat with abdominal tightness. Takes prochlorperazine as needed with relief.  Appetite/Weight changes: Diminished appetite, able to eat very small amounts more often during the day.  Sleep: Sleep improved since starting Pepcid.    Med update: Has not been taking Vit D, K, or Crestor Comes for treatment every 2 weeks.   Social: Lives at home with , has 1 daughter who lives with them. Used to work as .     I-STOP Ref#:[id number] PDI	Current Rx	Drug Type	Rx Written	Rx Dispensed	Drug	Quantity	Days Supply	Prescriber Name	Prescriber KARTHKI #	Payment Method	Dispenser A	N	O	01/27/2025	01/27/2025	oxycodone hcl (ir) 5 mg tablet	5	2	Sunni Ramires	[id number]	Medicare	[facility]

## 2025-05-30 NOTE — PHYSICAL EXAM
[General Appearance - Alert] : alert [General Appearance - In No Acute Distress] : in no acute distress [General Appearance - Well Nourished] : well nourished [General Appearance - Well Developed] : well developed [Extraocular Movements] : extraocular movements were intact [Hearing Threshold Finger Rub Not [place]] : hearing was normal [] : no respiratory distress [Respiration, Rhythm And Depth] : normal respiratory rhythm and effort [Exaggerated Use Of Accessory Muscles For Inspiration] : no accessory muscle use [Auscultation Breath Sounds / Voice Sounds] : lungs were clear to auscultation bilaterally [Heart Rate And Rhythm] : heart rate was normal and rhythm regular [Heart Sounds] : normal S1 and S2 [Murmurs] : no murmurs [Edema] : there was no peripheral edema [Bowel Sounds] : normal bowel sounds [FreeTextEntry1] : Firm, TTP, no ascites noted [Abnormal Walk] : normal gait [No Focal Deficits] : no focal deficits [Oriented To Time, Place, And Person] : oriented to person, place, and time [Impaired Insight] : insight and judgment were intact [Affect] : the affect was normal [Mood] : the mood was normal

## 2025-05-30 NOTE — HISTORY OF PRESENT ILLNESS
[FreeTextEntry1] : 73 yo F with cholangiocarcinoma presents for initial palliative care visit, referred by oncology.   PMH significant for ascites, arthritis, HLD, uterine leiomyoma, nephrolithiasis, OA, osteopenia, ovarian cysts, palpitations, parotid mass, pre-DM, SOBOE, diverticulosis, L hydronephrosis,   5/2024-Patient presented to [facility] with elevated LFTs. At that time, she also noted progressively darkening urine and pale stools for one week along with postprandial epigastric pain. 5/10/2024-Abdominal ultrasound-moderate intrahepatic and extrahepatic biliary dilatation. Abnormal appearing gallbladder. Abnormal soft tissue density within the distal portion of the common bile duct and at the ze hepatitis. The constellation of findings is most concerning for possible gallbladder neoplasm with extension to the ze habitus and associated biliary obstruction.  5/11/2024-CT abdomen/pelvis-gallbladder mass and soft tissue within the ze hepatis with enlarged portal caval node. Soft tissue involvement of the biliary ducts and vasculature. No evidence of metastatic disease within the chest. 5/11/2024 MR/MRCP-there is an approximately 2.2 cm obstructing mass centered at the bifurcation of the common hepatic duct with intrahepatic biliary duct dilatation, highly suspicious for cholangiocarcinoma ([place]kin tumor). Masslike appearance of the gallbladder fundus with cystic changes measuring approximately 3.3 cm, which could represent either masslike adenoma I will mitosis versus a separate gallbladder mass. No evidence of metastatic disease within the abdomen.  Disease: Cholangiocarcinoma/GB   Pathology: Adenocarcinoma   Tumor Markers: 5/21/2024-CA 19-9=290   7/15/2024-[employer] Liquid biopsy-ZHANE, TMB=1 Muts/Mb. ARID1A and TET2 mutations. No  diagnostic alterations for [employer] liquid CDX were detected.  Interval History: S/p hospitalization (5/5/25-5/7/25). S/p ERCP 5/6 w/ stone extraction and exchange of 2 plastic stents. S/p 1unit platelet on 5/10/25. S/p C4 FOLFOX Reports feeling weak with abdominal discomfort and bloating. She is eating small amounts because gets full easily. No N/V/D. Complains of occasional constipation. She met with IR and there was not enough fluid in abd to be drained.  Takes Tylenol 500mg at night for back pain. h/o Mild feet neuropathy. No falls. No difficulty walking or dropping things. Not taking gabapentin for this. Denies chest pain, cough SOB. ----------------------------------------------------------------------------------------------------------------------------------------- Pt was referred to supportive oncology for symptom management. Accompanied by  Dejan.   Pain: Admits to abdominal discomfort, described as tightness and cramping. Worsened by eating.  Discomfort is intermittent. Pain gets to 4-5/10, reduces to 1/10. When pain is 3/10 or above, difficult to eat and she lays down until it subsides. She previously was taking Tums and pepcid with relief, but over the last 2 days it hasn't been helping as much.  Gets some low back pain which she uses a heating pad / massage.  Constipation/Diarrhea: Admits to constipation, had small BM 4 days ago. Taking stool softener as needed, started 2 days ago. Has not been using Tylenol because she was told it isn't good for her liver.  Used to take gabapentin for neuropathy but has not taken it recently.  Nausea/Vomiting: Gets nausea if she tries to eat with abdominal tightness. Takes prochlorperazine as needed with relief.  Appetite/Weight changes: Diminished appetite, able to eat very small amounts more often during the day.  Sleep: Sleep improved since starting Pepcid.    Med update: Has not been taking Vit D, K, or Crestor Comes for treatment every 2 weeks.   Social: Lives at home with , has 1 daughter who lives with them. Used to work as .     I-STOP Ref#:[id number] PDI	Current Rx	Drug Type	Rx Written	Rx Dispensed	Drug	Quantity	Days Supply	Prescriber Name	Prescriber KARTHIK #	Payment Method	Dispenser A	N	O	01/27/2025	01/27/2025	oxycodone hcl (ir) 5 mg tablet	5	2	Sunni Ramires	[id number]	Medicare	[facility]

## 2025-05-30 NOTE — PHYSICAL EXAM
[General Appearance - Alert] : alert [General Appearance - In No Acute Distress] : in no acute distress [General Appearance - Well Nourished] : well nourished [General Appearance - Well Developed] : well developed [Extraocular Movements] : extraocular movements were intact [Hearing Threshold Finger Rub Not [place]] : hearing was normal [] : no respiratory distress [Respiration, Rhythm And Depth] : normal respiratory rhythm and effort [Exaggerated Use Of Accessory Muscles For Inspiration] : no accessory muscle use [Auscultation Breath Sounds / Voice Sounds] : lungs were clear to auscultation bilaterally [Heart Rate And Rhythm] : heart rate was normal and rhythm regular [Murmurs] : no murmurs [Heart Sounds] : normal S1 and S2 [Edema] : there was no peripheral edema [Bowel Sounds] : normal bowel sounds [FreeTextEntry1] : Firm, TTP, no ascites noted [Abnormal Walk] : normal gait [No Focal Deficits] : no focal deficits [Oriented To Time, Place, And Person] : oriented to person, place, and time [Impaired Insight] : insight and judgment were intact [Affect] : the affect was normal [Mood] : the mood was normal

## 2025-06-09 NOTE — HISTORY OF PRESENT ILLNESS
[Disease: _____________________] : Disease: [unfilled] [de-identified] : 5/2024-Patient presented to [facility] with elevated LFTs. At that time, she also noted progressively darkening urine and pale stools for one week along with postprandial epigastric pain. 5/10/2024-Abdominal ultrasound-moderate intrahepatic and extrahepatic biliary dilatation.  Abnormal appearing gallbladder.  Abnormal soft tissue density within the distal portion of the common bile duct and at the ze hepatitis.  The constellation of findings is most concerning for possible gallbladder neoplasm with extension to the ze habitus and associated biliary obstruction.  5/11/2024-CT abdomen/pelvis-gallbladder mass and soft tissue within the ze hepatis with enlarged portal caval node.  Soft tissue involvement of the biliary ducts and vasculature.  No evidence of metastatic disease within the chest. 5/11/2024 MR/MRCP-there is an approximately 2.2 cm obstructing mass centered at the bifurcation of the common hepatic duct with intrahepatic biliary duct dilatation, highly suspicious for cholangiocarcinoma ([place] tumor).  Masslike appearance of the gallbladder fundus with cystic changes measuring approximately 3.3 cm, which could represent either masslike adenoma I will mitosis versus a separate gallbladder mass.  No evidence of metastatic disease within the abdomen.  5/14/2024-EUS/ERCP 5/14/2024 (Dr. Dangelo) - proximal biliary mass without involvement of the portal vein, large ze hepatic LAD & gallbladder lesion noted - ERCP notable for hilar stricture s/p sphincterotomy, dilation of the stricture to 6 mm, brushing, biopsy, cholangioscopy & PLASTIC stent placed. *repeat in 3 months for stent removal/exchange if no sx done - cholangioscopy notable for abnormal mucosa of the proximal bile duct w/ narrowing and nodularity w/ decreased vascular pattern s/p spy bite bx ** biopsy of CBD structure c/w carcinoma, favor adeno Common bile duct stricture biopsy-small clusters of atypical cells, detached were within stroma, compatible with carcinoma and favor adenocarcinoma. Bile duct mass biopsy-minute fragments of fibrous tissue with marked crush artifact and rare, atypical cells singly or in small clusters.  5/28/2024-PET/CT scan- FDG-avid soft tissue within the ze hepatis, surrounding common bile duct stent, extending into gallbladder/gallbladder fossa, corresponds to known malignancy. FDG-avid soft tissue nodule in right parotid gland is nonspecific. Differential diagnosis includes benign and malignant salivary gland neoplasms and an intraparotid lymph node. Further evaluation may be performed with ultrasound and percutaneous needle biopsy. Indeterminate FDG-avid focus in fundus of uterus. Pelvic ultrasound/pelvic MRI may be obtained for further evaluation.  6/10/2024-Started neoadjuvant Gemcitabine/Cisplatin/Durvalumab.  Course complicated by biliary stent issues, Klebsiella bacteremia (7/1/2024).  7/2/2024-CT abdomen/pelvis-IMPRESSION: Adequate positioning of the biliary stent, however there is no  pneumobilia to suggest patency. Mild intrahepatic biliary ductal  dilatation. New from 6/4/2024 are ill-defined hypodensities scattered throughout the  bilateral hepatic lobes, suggestive of metastatic disease.  7/3/24-MRI Abdomen-IMPRESSION: 1.  Focal stricture of the common hepatic duct/common biliary duct  measuring 1.6 cm with progressive enhancement, consistent with  cholangiocarcinoma. CBD stent traverse through the focal stricture. Mild  intra and extrahepatic biliary ductal dilatation. 2.  Complex solid and cystic lesion in the gallbladder fossa with areas  of thickened septations/nodular enhancement. 3.  Multiple subcentimeter T2 hyperintense foci throughout the hepatic  parenchyma and few of which in the right hepatic lobe demonstrating  peripheral rim enhancement. Although nonspecific, these are suspicious  for microabscesses.  9/4/2024-MRI Abdomen-Resolution of right hepatic lesions, in keeping with infection. Bile duct mass, with associated common hepatic duct narrowing, without change. Slightly increased CBD dilatation with distal tapering. Unchanged intrahepatic biliary duct dilatation. Fundal gallbladder mass, slightly decreased in size. 11/6/2024-CT A/P-Fundal gallbladder mass is slightly decreased in size since 9/4/2024. CBD dilatation, unchanged. Known CBD mass is difficult to visualize on CT.  11/25/2024-Completed 8th cycle Gemcitabine/Cisplatin/Durvalumab.  12/18/2024-MRI abdomen/MRCP-. Since September 4, 2024, unchanged residual stricture at biliary ductal confluence and unchanged residual gallbladder tumor. No new suspicious finding.  1/2/2025-PET/CT scan-Compared to FDG PET/CT dated 5/28/2024: 1. FDG-avid lesion in ze hepatis adjacent to biliary stent and FDG-avid focus within the gallbladder, not well delineated on CT, are decreased in size and mildly decreased in metabolism, compatible with a partial response to interval therapy. 2. Resolution of small FDG-avid focus in right parotid region. 3. Resolution of FDG-avid focus in fundus of uterus. 4. No new lesion.  1/24/2025-Patient was scheduled for an exp lap hepatectomy & bile duct resection - however intra-operatively was noted to have positive peritoneal implants, case was aborted and biopsies taken, path: - peritoneal bx: moderately differentiated adeno - right diaphragmatic nodule: moderate to poorly differentiated adeno - peritoneal bx #2: moderately differentiated adeno PD-L1 TPS 75%, Her 2- (1+), pMMR  2/26/2025-CT C/A/P-Heterogeneity of the gallbladder fundus again noted. Multiple peritoneal implants consistent with metastatic disease. 3/3/2025-Began FOLFOX.  Hospital Course: Discharge Date 07-May-2025 Admission Date 05-May-2025 21:40 Reason for Admission RUQ pain i/s/o cholangiocarcinoma Hospital Course: 74 F with PMH cholangiocarcinoma on FOLFOX (last ~4/2025) S/P biliary stent (last exchanged 4/17) who presents with tachycardia, and RUQ/RLQ abdominal pain over the last 2 weeks. CT A/P noted carcinomatosis celestino mild ascites, biliary stent in place with mild dilatation of bile ducts. GI consulted, s/p ERCP 5/6 w/ stone extraction and exchange of 2 plastic stents. Empiric zosyn started, BCX NTD. Patient tolerating regular diet well, medically cleared for discharge.  05/05/2025: CT ABDOMEN AND PELVIS CT ANGIO CHEST PULM ART [facility]   ORDERED BY: TANJA SIMMONS PROCEDURE: CT Angiography of the Chest was performed followed by portal venous phase imaging of the Abdomen and Pelvis. Sagittal and coronal reformats were performed as well as 3D (MIP) reconstructions.  FINDINGS: CHEST: LUNGS AND LARGE AIRWAYS: Patent central airways. Subsegmental atelectasis in the bilateral lower lobes and lingula. PLEURA: No pleural effusion. VESSELS: No pulmonary embolism. Aortic calcifications. MediPort catheter tip terminates in the SVC. HEART: Heart size is normal. No pericardial effusion. MEDIASTINUM AND DANNY: No lymphadenopathy. CHEST WALL AND LOWER NECK: Right chest wall MediPort.  ABDOMEN AND PELVIS: LIVER: Within normal limits. BILE DUCTS: Mild intrahepatic biliary ductal dilatation. CBD stent is unchanged in position. Pneumobilia is no longer noted. GALLBLADDER: Unchanged appearance of the gallbladder fundus with ill-defined heterogeneity. SPLEEN: Borderline splenomegaly measuring 13.0 cm in craniocaudal dimension. PANCREAS: Within normal limits. ADRENALS: Within normal limits. KIDNEYS/URETERS: Bilateral subcentimeter hypodensities which are too small to characterize. No hydronephrosis.  BLADDER: Within normal limits. REPRODUCTIVE ORGANS: Fibroid uterus with interval increase in heterogeneous enhancement, either reflecting fibroid degeneration or worsening implants in the cul-de-sac now appearing inseparable from the uterus.  BOWEL: No bowel obstruction. Colonic diverticulosis. Appendix is normal. PERITONEUM/RETROPERITONEUM: Small to moderate ascites with peritoneal thickening and overall increase in nodularity, for reference a left anterior nodule currently measuring 1.5 cm (303-71), previously 0.9 cm, and the new right anterior nodule measuring 1.2 cm (303-62) consistent with implants. VESSELS: Within normal limits. LYMPH NODES: No lymphadenopathy. ABDOMINAL WALL: Within normal limits. BONES: Degenerative changes.  IMPRESSION: No pulmonary embolism. Overall increase of peritoneal thickening and nodularity with multiple implants which are increasing in size/number consistent with carcinomatosis. Small to moderate ascites.  5/5/25-Abdominal US-IMPRESSION: Small amount of ascites. Biliary stent     [de-identified] : 5/21/2024-CA 19-9=290 [de-identified] : Adenocarcinoma [de-identified] : +distention.  Mid abdominal discomfort-following palliative. Takes tylenol + famotidine with some improvement.  h/o Mild feet neuropathy. No falls. No difficulty walking or dropping things. Now taking gabapentin. Denies chest pain, cough SOB, bleeding/bruising, constipation, diarrhea.       [de-identified] : 7/15/2024-FoundationOne Liquid biopsy-ZHANE, TMB=1 Muts/Mb. ARID1A and TET2 mutations.  No  diagnostic alterations for FoundationOne liquid CDX were detected.

## 2025-06-09 NOTE — ASSESSMENT
[FreeTextEntry1] : Lab work reviewed. #1) GB carcinoma-T4N1(Stage IIIC) clinically. 5/28/2024-PET/CT scan-. FDG-avid soft tissue within the ze hepatis, surrounding common bile duct stent, extending into gallbladder/gallbladder fossa, corresponds to known malignancy. FDG-avid soft tissue nodule in right parotid gland is nonspecific. Differential diagnosis includes benign and malignant salivary gland neoplasms and an intraparotid lymph node. Further evaluation may be performed with ultrasound and percutaneous needle biopsy. Indeterminate FDG-avid focus in fundus of uterus. Pelvic ultrasound/pelvic MRI may be obtained for further evaluation. -**Requested Foundation One, PD-L1 on pathology-insufficient tissue. 7/15/2024-[facility] Liquid biopsy-ZHANE, TMB=1 Muts/Mb. ARID1A and TET2 mutations.  No  diagnostic alterations for FoundationOne liquid CDX were detected. --had reviewed roles of surgery/radiation/systemic therapy in biliary tract cancers-requires a multidisciplinary approach.  5/30/2024-Had discussed case with surgeon Dr. Allison.  He recommended neoadjuvant systemic therapy with interval follow-up imaging at 2 months, and pending this, again at 4 months for surgical decisions.  6/10/2024-Started neoadjuvant Gemcitabine/Cisplatin/Durvalumab.  Course complicated by biliary stent issues, Klebsiella bacteremia. Following treatment of infection, resumed treatment.  With creatinine improved, resumed cisplatin.  Needed to further elucidate liver lesions: 7/2/2024-CT abdomen/pelvis-IMPRESSION: Adequate positioning of the biliary stent, however there is no  pneumobilia to suggest patency. Mild intrahepatic biliary ductal  dilatation. New from 6/4/2024 are ill-defined hypodensities scattered throughout the  bilateral hepatic lobes, suggestive of metastatic disease.  7/3/24-MRI Abdomen-IMPRESSION: 1.  Focal stricture of the common hepatic duct/common biliary duct  measuring 1.6 cm with progressive enhancement, consistent with  cholangiocarcinoma. CBD stent traverse through the focal stricture. Mild  intra and extrahepatic biliary ductal dilatation. 2.  Complex solid and cystic lesion in the gallbladder fossa with areas  of thickened septations/nodular enhancement. 3.  Multiple subcentimeter T2 hyperintense foci throughout the hepatic  parenchyma and few of which in the right hepatic lobe demonstrating  peripheral rim enhancement. Although nonspecific, these are suspicious  for microabscesses.  --?Micro abscesses vs. mets 7/18/2024-MRI Abdomen-1.  Several small right hepatic lobe liver lesions are without significant change from 7/3/2024. While favored to be inflammatory from prior cholangitis (improvement on imaging can lag behind clinical improvement), metastatic disease remains possible. Follow-up MRI abdomen recommended in 6-8 weeks. 2.  Bile duct mass and gallbladder mass are without significant change. 3.  Iron deposition within the liver. 9/4/2024-MRI Abdomen-Resolution of right hepatic lesions, in keeping with infection. Bile duct mass, with associated common hepatic duct narrowing, without change. Slightly increased CBD dilatation with distal tapering. Unchanged intrahepatic biliary duct dilatation. Fundal gallbladder mass, slightly decreased in size. 11/6/2024-CT A/P-Fundal gallbladder mass is slightly decreased in size since 9/4/2024. CBD dilatation, unchanged. Known CBD mass is difficult to visualize on CT.  11/25/2024-Completed 8th cycle Gemcitabine/Cisplatin/Durvalumab.  12/18/2024-MRI abdomen/MRCP-Since September 4, 2024, unchanged residual stricture at biliary ductal confluence and unchanged residual gallbladder tumor. No new suspicious finding. 1/2/2025-PET/CT scan-Compared to FDG PET/CT dated 5/28/2024: 1. FDG-avid lesion in ze hepatis adjacent to biliary stent and FDG-avid focus within the gallbladder, not well delineated on CT, are decreased in size and mildly decreased in metabolism, compatible with a partial response to interval therapy. 2. Resolution of small FDG-avid focus in right parotid region. 3. Resolution of FDG-avid focus in fundus of uterus. 4. No new lesion.  **1/24/2025-Patient was scheduled for an exp lap hepatectomy & bile duct resection - however intra-operatively was noted to have positive peritoneal implants, case was aborted and biopsies taken, path: - peritoneal bx: moderately differentiated adenocarcinoma - right diaphragmatic nodule: moderate to poorly differentiated adenocarcinoma - peritoneal bx #2: moderately differentiated adenocarcinoma PD-L1 TPS 75%, Her 2- (1+), pMMR; Foundation testing pending per path report.  2/26/2025-CT C/A/P-Heterogeneity of the gallbladder fundus again noted. Multiple peritoneal implants consistent with metastatic disease. As patient now with measurable progression of disease on imaging, recommended change in systemic therapy-to consider FOLFOX regimen.   3/3/2025-Began FOLFOX. Note, received message from GI/hepatobiliary research team at [facility] 2/26/2025-they do not have any trial options for this patient at this time. --clinically stable for treatment today if CBC adequate. Have had to hold chemo x 2 weeks due to cytopenias (thrombocytopenia+/- neutropenia)-will now decrease chemo dosage by ~20%-assess tolerance/response-patient/ advised. --continue to monitor LFTs. 4/11/2025 Abdominal US-Known gallbladder malignancy with heterogeneous and ill-defined soft tissue extending from gallbladder into the adjacent liver. Will obtain f/u US now in light of RUQ discomfort. May need t/c palliative RT if felt tumor related rather than stent related. --will give benadryl pre-oxaliplatin as did with cisplatin post an episode of hives with one of the earlier treatments with [place]-Cis (remainder of cycles without adverse reaction)-have discussed with patient/-they have concurred with plans. Treatment RN to monitor closely for s/sx. reaction as per protocol. --5/5/25-5/7/25- s/p hospitalization for RUQ pain i/s/o cholangiocarcinoma. CT A/P noted carcinomatosis with mild ascites, biliary stent in place with mild dilatation of bile ducts. S/p ERCP 5/6 w/ stone extraction and exchange of 2 plastic stents. Empiric zosyn started, BCX NTD.  --5/5/25-CTAP and CTA-IMPRESSION: No pulmonary embolism. Overall increase of peritoneal thickening and nodularity with multiple implants which are increasing in size/number consistent with carcinomatosis. Small to moderate ascites. --5/5/25-Abdominal US-IMPRESSION: Small amount of ascites. Biliary stent --clinically stable for treatment today for which patient consented-Plt 86K. Plan for platelet transfusion.  #2) FDG-avid soft tissue nodule in right parotid gland nonspecific on PET/CT scan 5/25/2024.  F/U right parotid US 7/30/2024-The right parotid gland is normal in size and echogenicity. Again noted is 8 x 7 x 5 mm heterogeneous predominantly isoechoic nodularity in the superficial midportion of the parotid gland, unchanged. Findings are nonspecific. Consider follow up ultrasound in 6 months. 9/2024-Saw ENT MD Dr. Cid. 9/30/2024-SALIVARY GLAND, PAROTID, RIGHT INFERIOR, US GUIDED FNA NON DIAGNOSTIC. Benign salivary gland tissue only Resolution of small FDG-avid focus in right parotid region on PET/CT scan 12/24/2024. ---continue f/u with ENT MD   #3) indeterminate FDG-avid focus in fundus of uterus on PET/CT scan 5/25/2024. Pelvic US 7/30/2024-Leiomyomatous uterus. Subcentimeter bilateral simple ovarian cyst. Trace fundal endometrial fluid. Resolution of FDG-avid focus in fundus of uterus on 12/24/2024 PET/CT scan. --has seen GYN MD 1/2024-yearly f/u as planned  #4) mild neuropathy symptoms-PRN gabapentin; foot massage  #5) Ascites/abdominal discomfort. Symptomatic (distention and uncomfortable) --seen on CT scan on 5/5/25. --Saw IR not enough fluid for paracentesis. Likely disease-related. --following palliative. Cont PPI+tylenol prn.   #6) back pain: can take tylenol 1 tab daily PRN. discussed minimize use of tylenol due to transaminitis.  --prior urine cultures negative for UTI.  #7) Thrombocytopenia: --5/9/25-Plt 76k, s/p 1U platelets on 5/10/25 with improvement.  --6/9/25-Plt 86K. Plan for 1U platetes on 6/11/25 -Plan for return 1-week, on Thursdays post treatment to recheck CBC with platelet transfusion PRN to maintain plt count around 75K to ensure she receives her chemotherapy treatment timely given disease progression.   Patient was given the opportunity to ask questions. Her questions have been answered to her apparent satisfaction at this time. She expressed her understanding and willingness to comply with recommended f/u.  -->FOLFOX-doses now decreased by ~20%. Cycle every 14 days. Neulasta Onpro support. Cycle #6. RTO 2 weeks. CBC check in 1 week, Thursday, 6/19/25.

## 2025-06-09 NOTE — HISTORY OF PRESENT ILLNESS
[Disease: _____________________] : Disease: [unfilled] [de-identified] : 5/2024-Patient presented to [facility] with elevated LFTs. At that time, she also noted progressively darkening urine and pale stools for one week along with postprandial epigastric pain. 5/10/2024-Abdominal ultrasound-moderate intrahepatic and extrahepatic biliary dilatation.  Abnormal appearing gallbladder.  Abnormal soft tissue density within the distal portion of the common bile duct and at the ze hepatitis.  The constellation of findings is most concerning for possible gallbladder neoplasm with extension to the ze habitus and associated biliary obstruction.  5/11/2024-CT abdomen/pelvis-gallbladder mass and soft tissue within the ze hepatis with enlarged portal caval node.  Soft tissue involvement of the biliary ducts and vasculature.  No evidence of metastatic disease within the chest. 5/11/2024 MR/MRCP-there is an approximately 2.2 cm obstructing mass centered at the bifurcation of the common hepatic duct with intrahepatic biliary duct dilatation, highly suspicious for cholangiocarcinoma (Klatskin tumor).  Masslike appearance of the gallbladder fundus with cystic changes measuring approximately 3.3 cm, which could represent either masslike adenoma I will mitosis versus a separate gallbladder mass.  No evidence of metastatic disease within the abdomen.  5/14/2024-EUS/ERCP 5/14/2024 (Dr. Dangelo) - proximal biliary mass without involvement of the portal vein, large ze hepatic LAD & gallbladder lesion noted - ERCP notable for hilar stricture s/p sphincterotomy, dilation of the stricture to 6 mm, brushing, biopsy, cholangioscopy & PLASTIC stent placed. *repeat in 3 months for stent removal/exchange if no sx done - cholangioscopy notable for abnormal mucosa of the proximal bile duct w/ narrowing and nodularity w/ decreased vascular pattern s/p spy bite bx ** biopsy of CBD structure c/w carcinoma, favor adeno Common bile duct stricture biopsy-small clusters of atypical cells, detached were within stroma, compatible with carcinoma and favor adenocarcinoma. Bile duct mass biopsy-minute fragments of fibrous tissue with marked crush artifact and rare, atypical cells singly or in small clusters.  5/28/2024-PET/CT scan- FDG-avid soft tissue within the ze hepatis, surrounding common bile duct stent, extending into gallbladder/gallbladder fossa, corresponds to known malignancy. FDG-avid soft tissue nodule in right parotid gland is nonspecific. Differential diagnosis includes benign and malignant salivary gland neoplasms and an intraparotid lymph node. Further evaluation may be performed with ultrasound and percutaneous needle biopsy. Indeterminate FDG-avid focus in fundus of uterus. Pelvic ultrasound/pelvic MRI may be obtained for further evaluation.  6/10/2024-Started neoadjuvant Gemcitabine/Cisplatin/Durvalumab.  Course complicated by biliary stent issues, Klebsiella bacteremia (7/1/2024).  7/2/2024-CT abdomen/pelvis-IMPRESSION: Adequate positioning of the biliary stent, however there is no  pneumobilia to suggest patency. Mild intrahepatic biliary ductal  dilatation. New from 6/4/2024 are ill-defined hypodensities scattered throughout the  bilateral hepatic lobes, suggestive of metastatic disease.  7/3/24-MRI Abdomen-IMPRESSION: 1.  Focal stricture of the common hepatic duct/common biliary duct  measuring 1.6 cm with progressive enhancement, consistent with  cholangiocarcinoma. CBD stent traverse through the focal stricture. Mild  intra and extrahepatic biliary ductal dilatation. 2.  Complex solid and cystic lesion in the gallbladder fossa with areas  of thickened septations/nodular enhancement. 3.  Multiple subcentimeter T2 hyperintense foci throughout the hepatic  parenchyma and few of which in the right hepatic lobe demonstrating  peripheral rim enhancement. Although nonspecific, these are suspicious  for microabscesses.  9/4/2024-MRI Abdomen-Resolution of right hepatic lesions, in keeping with infection. Bile duct mass, with associated common hepatic duct narrowing, without change. Slightly increased CBD dilatation with distal tapering. Unchanged intrahepatic biliary duct dilatation. Fundal gallbladder mass, slightly decreased in size. 11/6/2024-CT A/P-Fundal gallbladder mass is slightly decreased in size since 9/4/2024. CBD dilatation, unchanged. Known CBD mass is difficult to visualize on CT.  11/25/2024-Completed 8th cycle Gemcitabine/Cisplatin/Durvalumab.  12/18/2024-MRI abdomen/MRCP-. Since September 4, 2024, unchanged residual stricture at biliary ductal confluence and unchanged residual gallbladder tumor. No new suspicious finding.  1/2/2025-PET/CT scan-Compared to FDG PET/CT dated 5/28/2024: 1. FDG-avid lesion in ze hepatis adjacent to biliary stent and FDG-avid focus within the gallbladder, not well delineated on CT, are decreased in size and mildly decreased in metabolism, compatible with a partial response to interval therapy. 2. Resolution of small FDG-avid focus in right parotid region. 3. Resolution of FDG-avid focus in fundus of uterus. 4. No new lesion.  1/24/2025-Patient was scheduled for an exp lap hepatectomy & bile duct resection - however intra-operatively was noted to have positive peritoneal implants, case was aborted and biopsies taken, path: - peritoneal bx: moderately differentiated adeno - right diaphragmatic nodule: moderate to poorly differentiated adeno - peritoneal bx #2: moderately differentiated adeno PD-L1 TPS 75%, Her 2- (1+), pMMR  2/26/2025-CT C/A/P-Heterogeneity of the gallbladder fundus again noted. Multiple peritoneal implants consistent with metastatic disease. 3/3/2025-Began FOLFOX.  Hospital Course: Discharge Date 07-May-2025 Admission Date 05-May-2025 21:40 Reason for Admission RUQ pain i/s/o cholangiocarcinoma Hospital Course: 74 F with PMH cholangiocarcinoma on FOLFOX (last ~4/2025) S/P biliary stent (last exchanged 4/17) who presents with tachycardia, and RUQ/RLQ abdominal pain over the last 2 weeks. CT A/P noted carcinomatosis celestino mild ascites, biliary stent in place with mild dilatation of bile ducts. GI consulted, s/p ERCP 5/6 w/ stone extraction and exchange of 2 plastic stents. Empiric zosyn started, BCX NTD. Patient tolerating regular diet well, medically cleared for discharge.  05/05/2025: CT ABDOMEN AND PELVIS CT ANGIO CHEST PULM ART [facility]   ORDERED BY: TANJA SIMMONS PROCEDURE: CT Angiography of the Chest was performed followed by portal venous phase imaging of the Abdomen and Pelvis. Sagittal and coronal reformats were performed as well as 3D (MIP) reconstructions.  FINDINGS: CHEST: LUNGS AND LARGE AIRWAYS: Patent central airways. Subsegmental atelectasis in the bilateral lower lobes and lingula. PLEURA: No pleural effusion. VESSELS: No pulmonary embolism. Aortic calcifications. MediPort catheter tip terminates in the SVC. HEART: Heart size is normal. No pericardial effusion. MEDIASTINUM AND DANNY: No lymphadenopathy. CHEST WALL AND LOWER NECK: Right chest wall MediPort.  ABDOMEN AND PELVIS: LIVER: Within normal limits. BILE DUCTS: Mild intrahepatic biliary ductal dilatation. CBD stent is unchanged in position. Pneumobilia is no longer noted. GALLBLADDER: Unchanged appearance of the gallbladder fundus with ill-defined heterogeneity. SPLEEN: Borderline splenomegaly measuring 13.0 cm in craniocaudal dimension. PANCREAS: Within normal limits. ADRENALS: Within normal limits. KIDNEYS/URETERS: Bilateral subcentimeter hypodensities which are too small to characterize. No hydronephrosis.  BLADDER: Within normal limits. REPRODUCTIVE ORGANS: Fibroid uterus with interval increase in heterogeneous enhancement, either reflecting fibroid degeneration or worsening implants in the cul-de-sac now appearing inseparable from the uterus.  BOWEL: No bowel obstruction. Colonic diverticulosis. Appendix is normal. PERITONEUM/RETROPERITONEUM: Small to moderate ascites with peritoneal thickening and overall increase in nodularity, for reference a left anterior nodule currently measuring 1.5 cm (303-71), previously 0.9 cm, and the new right anterior nodule measuring 1.2 cm (303-62) consistent with implants. VESSELS: Within normal limits. LYMPH NODES: No lymphadenopathy. ABDOMINAL WALL: Within normal limits. BONES: Degenerative changes.  IMPRESSION: No pulmonary embolism. Overall increase of peritoneal thickening and nodularity with multiple implants which are increasing in size/number consistent with carcinomatosis. Small to moderate ascites.  5/5/25-Abdominal US-IMPRESSION: Small amount of ascites. Biliary stent     [de-identified] : 5/21/2024-CA 19-9=290 [de-identified] : Adenocarcinoma [de-identified] : +distention.  Mid abdominal discomfort-following palliative. Takes tylenol + famotidine with some improvement.  h/o Mild feet neuropathy. No falls. No difficulty walking or dropping things. Now taking gabapentin. Denies chest pain, cough SOB, bleeding/bruising, constipation, diarrhea.       [de-identified] : 7/15/2024-FoundationOne Liquid biopsy-ZHANE, TMB=1 Muts/Mb. ARID1A and TET2 mutations.  No  diagnostic alterations for FoundationOne liquid CDX were detected.

## 2025-06-09 NOTE — ASSESSMENT
[FreeTextEntry1] : Lab work reviewed. #1) GB carcinoma-T4N1(Stage IIIC) clinically. 5/28/2024-PET/CT scan-. FDG-avid soft tissue within the ze hepatis, surrounding common bile duct stent, extending into gallbladder/gallbladder fossa, corresponds to known malignancy. FDG-avid soft tissue nodule in right parotid gland is nonspecific. Differential diagnosis includes benign and malignant salivary gland neoplasms and an intraparotid lymph node. Further evaluation may be performed with ultrasound and percutaneous needle biopsy. Indeterminate FDG-avid focus in fundus of uterus. Pelvic ultrasound/pelvic MRI may be obtained for further evaluation. -**Requested Foundation One, PD-L1 on pathology-insufficient tissue. 7/15/2024-FoundationOne Liquid biopsy-ZHANE, TMB=1 Muts/Mb. ARID1A and TET2 mutations.  No  diagnostic alterations for FoundationOne liquid CDX were detected. --had reviewed roles of surgery/radiation/systemic therapy in biliary tract cancers-requires a multidisciplinary approach.  5/30/2024-Had discussed case with surgeon Dr. Alliosn.  He recommended neoadjuvant systemic therapy with interval follow-up imaging at 2 months, and pending this, again at 4 months for surgical decisions.  6/10/2024-Started neoadjuvant Gemcitabine/Cisplatin/Durvalumab.  Course complicated by biliary stent issues, Klebsiella bacteremia. Following treatment of infection, resumed treatment.  With creatinine improved, resumed cisplatin.  Needed to further elucidate liver lesions: 7/2/2024-CT abdomen/pelvis-IMPRESSION: Adequate positioning of the biliary stent, however there is no  pneumobilia to suggest patency. Mild intrahepatic biliary ductal  dilatation. New from 6/4/2024 are ill-defined hypodensities scattered throughout the  bilateral hepatic lobes, suggestive of metastatic disease.  7/3/24-MRI Abdomen-IMPRESSION: 1.  Focal stricture of the common hepatic duct/common biliary duct  measuring 1.6 cm with progressive enhancement, consistent with  cholangiocarcinoma. CBD stent traverse through the focal stricture. Mild  intra and extrahepatic biliary ductal dilatation. 2.  Complex solid and cystic lesion in the gallbladder fossa with areas  of thickened septations/nodular enhancement. 3.  Multiple subcentimeter T2 hyperintense foci throughout the hepatic  parenchyma and few of which in the right hepatic lobe demonstrating  peripheral rim enhancement. Although nonspecific, these are suspicious  for microabscesses.  --?Micro abscesses vs. mets 7/18/2024-MRI Abdomen-1.  Several small right hepatic lobe liver lesions are without significant change from 7/3/2024. While favored to be inflammatory from prior cholangitis (improvement on imaging can lag behind clinical improvement), metastatic disease remains possible. Follow-up MRI abdomen recommended in 6-8 weeks. 2.  Bile duct mass and gallbladder mass are without significant change. 3.  Iron deposition within the liver. 9/4/2024-MRI Abdomen-Resolution of right hepatic lesions, in keeping with infection. Bile duct mass, with associated common hepatic duct narrowing, without change. Slightly increased CBD dilatation with distal tapering. Unchanged intrahepatic biliary duct dilatation. Fundal gallbladder mass, slightly decreased in size. 11/6/2024-CT A/P-Fundal gallbladder mass is slightly decreased in size since 9/4/2024. CBD dilatation, unchanged. Known CBD mass is difficult to visualize on CT.  11/25/2024-Completed 8th cycle Gemcitabine/Cisplatin/Durvalumab.  12/18/2024-MRI abdomen/MRCP-Since September 4, 2024, unchanged residual stricture at biliary ductal confluence and unchanged residual gallbladder tumor. No new suspicious finding. 1/2/2025-PET/CT scan-Compared to FDG PET/CT dated 5/28/2024: 1. FDG-avid lesion in ze hepatis adjacent to biliary stent and FDG-avid focus within the gallbladder, not well delineated on CT, are decreased in size and mildly decreased in metabolism, compatible with a partial response to interval therapy. 2. Resolution of small FDG-avid focus in right parotid region. 3. Resolution of FDG-avid focus in fundus of uterus. 4. No new lesion.  **1/24/2025-Patient was scheduled for an exp lap hepatectomy & bile duct resection - however intra-operatively was noted to have positive peritoneal implants, case was aborted and biopsies taken, path: - peritoneal bx: moderately differentiated adenocarcinoma - right diaphragmatic nodule: moderate to poorly differentiated adenocarcinoma - peritoneal bx #2: moderately differentiated adenocarcinoma PD-L1 TPS 75%, Her 2- (1+), pMMR; Foundation testing pending per path report.  2/26/2025-CT C/A/P-Heterogeneity of the gallbladder fundus again noted. Multiple peritoneal implants consistent with metastatic disease. As patient now with measurable progression of disease on imaging, recommended change in systemic therapy-to consider FOLFOX regimen.   3/3/2025-Began FOLFOX. Note, received message from GI/hepatobiliary research team at [facility] 2/26/2025-they do not have any trial options for this patient at this time. --clinically stable for treatment today if CBC adequate. Have had to hold chemo x 2 weeks due to cytopenias (thrombocytopenia+/- neutropenia)-will now decrease chemo dosage by ~20%-assess tolerance/response-patient/ advised. --continue to monitor LFTs. 4/11/2025 Abdominal US-Known gallbladder malignancy with heterogeneous and ill-defined soft tissue extending from gallbladder into the adjacent liver. Will obtain f/u US now in light of RUQ discomfort. May need t/c palliative RT if felt tumor related rather than stent related. --will give benadryl pre-oxaliplatin as did with cisplatin post an episode of hives with one of the earlier treatments with Fannin-Cis (remainder of cycles without adverse reaction)-have discussed with patient/-they have concurred with plans. Treatment RN to monitor closely for s/sx. reaction as per protocol. --5/5/25-5/7/25- s/p hospitalization for RUQ pain i/s/o cholangiocarcinoma. CT A/P noted carcinomatosis with mild ascites, biliary stent in place with mild dilatation of bile ducts. S/p ERCP 5/6 w/ stone extraction and exchange of 2 plastic stents. Empiric zosyn started, BCX NTD.  --5/5/25-CTAP and CTA-IMPRESSION: No pulmonary embolism. Overall increase of peritoneal thickening and nodularity with multiple implants which are increasing in size/number consistent with carcinomatosis. Small to moderate ascites. --5/5/25-Abdominal US-IMPRESSION: Small amount of ascites. Biliary stent --clinically stable for treatment today for which patient consented-Plt 86K. Plan for platelet transfusion.  #2) FDG-avid soft tissue nodule in right parotid gland nonspecific on PET/CT scan 5/25/2024.  F/U right parotid US 7/30/2024-The right parotid gland is normal in size and echogenicity. Again noted is 8 x 7 x 5 mm heterogeneous predominantly isoechoic nodularity in the superficial midportion of the parotid gland, unchanged. Findings are nonspecific. Consider follow up ultrasound in 6 months. 9/2024-Saw ENT MD Dr. Cid. 9/30/2024-SALIVARY GLAND, PAROTID, RIGHT INFERIOR, US GUIDED FNA NON DIAGNOSTIC. Benign salivary gland tissue only Resolution of small FDG-avid focus in right parotid region on PET/CT scan 12/24/2024. ---continue f/u with ENT MD   #3) indeterminate FDG-avid focus in fundus of uterus on PET/CT scan 5/25/2024. Pelvic US 7/30/2024-Leiomyomatous uterus. Subcentimeter bilateral simple ovarian cyst. Trace fundal endometrial fluid. Resolution of FDG-avid focus in fundus of uterus on 12/24/2024 PET/CT scan. --has seen GYN MD 1/2024-yearly f/u as planned  #4) mild neuropathy symptoms-PRN gabapentin; foot massage  #5) Ascites/abdominal discomfort. Symptomatic (distention and uncomfortable) --seen on CT scan on 5/5/25. --Saw IR not enough fluid for paracentesis. Likely disease-related. --following palliative. Cont PPI+tylenol prn.   #6) back pain: can take tylenol 1 tab daily PRN. discussed minimize use of tylenol due to transaminitis.  --prior urine cultures negative for UTI.  #7) Thrombocytopenia: --5/9/25-Plt 76k, s/p 1U platelets on 5/10/25 with improvement.  --6/9/25-Plt 86K. Plan for 1U platetes on 6/11/25 -Plan for return 1-week, on Thursdays post treatment to recheck CBC with platelet transfusion PRN to maintain plt count around 75K to ensure she receives her chemotherapy treatment timely given disease progression.   Patient was given the opportunity to ask questions. Her questions have been answered to her apparent satisfaction at this time. She expressed her understanding and willingness to comply with recommended f/u.  -->FOLFOX-doses now decreased by ~20%. Cycle every 14 days. Neulasta Onpro support. Cycle #6. RTO 2 weeks. CBC check in 1 week, Thursday, 6/19/25.

## 2025-06-09 NOTE — PHYSICAL EXAM
[Restricted in physically strenuous activity but ambulatory and able to carry out work of a light or sedentary nature] : Status 1- Restricted in physically strenuous activity but ambulatory and able to carry out work of a light or sedentary nature, e.g., light house work, office work [Normal] : affect appropriate [de-identified] : mildline incision healed well, soft, mildly tender to palpation Mid-abdominal

## 2025-06-09 NOTE — REVIEW OF SYSTEMS
[Palpitations] : palpitations [Diarrhea: Grade 0] : Diarrhea: Grade 0 [Negative] : Allergic/Immunologic [Fatigue] : fatigue [Fever] : no fever [Chest Pain] : no chest pain [Nosebleeds] : no nosebleeds [Lower Ext Edema] : no lower extremity edema [Shortness Of Breath] : no shortness of breath [SOB on Exertion] : no shortness of breath during exertion [Cough] : no cough [Constipation] : no constipation [FreeTextEntry7] : feels distended-discomfort [FreeTextEntry9] : back pain

## 2025-06-09 NOTE — PHYSICAL EXAM
[Restricted in physically strenuous activity but ambulatory and able to carry out work of a light or sedentary nature] : Status 1- Restricted in physically strenuous activity but ambulatory and able to carry out work of a light or sedentary nature, e.g., light house work, office work [Normal] : affect appropriate [de-identified] : mildline incision healed well, soft, mildly tender to palpation Mid-abdominal

## 2025-06-09 NOTE — REVIEW OF SYSTEMS
[Palpitations] : palpitations [Diarrhea: Grade 0] : Diarrhea: Grade 0 [Negative] : Allergic/Immunologic [Fatigue] : fatigue [Fever] : no fever [Nosebleeds] : no nosebleeds [Chest Pain] : no chest pain [Shortness Of Breath] : no shortness of breath [Lower Ext Edema] : no lower extremity edema [Cough] : no cough [SOB on Exertion] : no shortness of breath during exertion [Constipation] : no constipation [FreeTextEntry7] : feels distended-discomfort [FreeTextEntry9] : back pain

## 2025-06-10 NOTE — HISTORY OF PRESENT ILLNESS
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   PMH significant for ascites, arthritis, HLD, uterine leiomyoma, nephrolithiasis, OA, osteopenia, ovarian cysts, palpitations, parotid mass, pre-DM, SOBOE, diverticulosis, L hydronephrosis,   5/2024-Patient presented to [facility] with elevated LFTs. At that time, she also noted progressively darkening urine and pale stools for one week along with postprandial epigastric pain. 5/10/2024-Abdominal ultrasound-moderate intrahepatic and extrahepatic biliary dilatation. Abnormal appearing gallbladder. Abnormal soft tissue density within the distal portion of the common bile duct and at the ze hepatitis. The constellation of findings is most concerning for possible gallbladder neoplasm with extension to the ze habitus and associated biliary obstruction.  5/11/2024-CT abdomen/pelvis-gallbladder mass and soft tissue within the ze hepatis with enlarged portal caval node. Soft tissue involvement of the biliary ducts and vasculature. No evidence of metastatic disease within the chest. 5/11/2024 MR/MRCP-there is an approximately 2.2 cm obstructing mass centered at the bifurcation of the common hepatic duct with intrahepatic biliary duct dilatation, highly suspicious for cholangiocarcinoma (Klatskin tumor). Masslike appearance of the gallbladder fundus with cystic changes measuring approximately 3.3 cm, which could represent either masslike adenoma I will mitosis versus a separate gallbladder mass. No evidence of metastatic disease within the abdomen.  Disease: Cholangiocarcinoma/GB   Pathology: Adenocarcinoma   Tumor Markers: 5/21/2024-CA 19-9=290   7/15/2024-FoundationOne Liquid biopsy-ZHANE, TMB=1 Muts/Mb. ARID1A and TET2 mutations. No  diagnostic alterations for FoundationOne liquid CDX were detected.  Interval History: S/p hospitalization (5/5/25-5/7/25). S/p ERCP 5/6 w/ stone extraction and exchange of 2 plastic stents. S/p 1unit platelet on 5/10/25. S/p C4 FOLFOX Reports feeling weak with abdominal discomfort and bloating. She is eating small amounts because gets full easily. No N/V/D. Complains of occasional constipation. She met with IR and there was not enough fluid in abd to be drained.  Takes Tylenol 500mg at night for back pain. h/o Mild feet neuropathy. No falls. No difficulty walking or dropping things. Not taking gabapentin for this. Denies chest pain, cough SOB. ----------------------------------------------------------------------------------------------------------------------------------------- Pt was referred to supportive oncology for symptom management. Accompanied by  Dejan at initial visit. She admits to abdominal discomfort, described as tightness and cramping. Worsened by eating.  Discomfort is intermittent. Pain gets to 4-5/10, reduces to 1/10. When pain is 3/10 or above, difficult to eat and she lays down until it subsides. She previously was taking Tums and pepcid with relief, but over the last 2 days it hasn't been helping as much.  Gets some low back pain which she uses a heating pad / massage.Admits to constipation, had small BM 4 days ago. Taking stool softener as needed, started 2 days ago. Has not been using Tylenol because she was told it isn't good for her liver.  Used to take gabapentin for neuropathy but has not taken it recently.  Gets nausea if she tries to eat with abdominal tightness. Takes prochlorperazine as needed with relief.  Diminished appetite, able to eat very small amounts more often during the day. Sleep improved since starting Pepcid.    Med update: Has not been taking Vit D, K, or Crestor Comes for treatment every 2 weeks.   Social: Lives at home with , has 1 daughter who lives with them. Used to work as .    6/9/25 interval hx: Seen in tx room accompanied by . She states she took 4 tab of senna Friday into Sat and had multiple episodes of loose stools. After that time, she stopped using daily senna and is taking only as needed. She is having a BM every other day. She has not used any oxycodone and is using tylenol as needed daily. Admits to low appetite, but gained a few pounds because she is forcing herself to eat.     I-STOP Ref#:[id number]

## 2025-06-10 NOTE — HISTORY OF PRESENT ILLNESS
[FreeTextEntry1] : 73 yo F with cholangiocarcinoma presents for follow up palliative care visit, referred by oncology.   PMH significant for ascites, arthritis, HLD, uterine leiomyoma, nephrolithiasis, OA, osteopenia, ovarian cysts, palpitations, parotid mass, pre-DM, SOBOE, diverticulosis, L hydronephrosis,   5/2024-Patient presented to [facility] with elevated LFTs. At that time, she also noted progressively darkening urine and pale stools for one week along with postprandial epigastric pain. 5/10/2024-Abdominal ultrasound-moderate intrahepatic and extrahepatic biliary dilatation. Abnormal appearing gallbladder. Abnormal soft tissue density within the distal portion of the common bile duct and at the ze hepatitis. The constellation of findings is most concerning for possible gallbladder neoplasm with extension to the ze habitus and associated biliary obstruction.  5/11/2024-CT abdomen/pelvis-gallbladder mass and soft tissue within the ze hepatis with enlarged portal caval node. Soft tissue involvement of the biliary ducts and vasculature. No evidence of metastatic disease within the chest. 5/11/2024 MR/MRCP-there is an approximately 2.2 cm obstructing mass centered at the bifurcation of the common hepatic duct with intrahepatic biliary duct dilatation, highly suspicious for cholangiocarcinoma (Klatskin tumor). Masslike appearance of the gallbladder fundus with cystic changes measuring approximately 3.3 cm, which could represent either masslike adenoma I will mitosis versus a separate gallbladder mass. No evidence of metastatic disease within the abdomen.  Disease: Cholangiocarcinoma/GB   Pathology: Adenocarcinoma   Tumor Markers: 5/21/2024-CA 19-9=290   7/15/2024-[facility] Liquid biopsy-ZHANE, TMB=1 Muts/Mb. ARID1A and TET2 mutations. No  diagnostic alterations for FoundationOne liquid CDX were detected.  Interval History: S/p hospitalization (5/5/25-5/7/25). S/p ERCP 5/6 w/ stone extraction and exchange of 2 plastic stents. S/p 1unit platelet on 5/10/25. S/p C4 FOLFOX Reports feeling weak with abdominal discomfort and bloating. She is eating small amounts because gets full easily. No N/V/D. Complains of occasional constipation. She met with IR and there was not enough fluid in abd to be drained.  Takes Tylenol 500mg at night for back pain. h/o Mild feet neuropathy. No falls. No difficulty walking or dropping things. Not taking gabapentin for this. Denies chest pain, cough SOB. ----------------------------------------------------------------------------------------------------------------------------------------- Pt was referred to supportive oncology for symptom management. Accompanied by  Dejan at initial visit. She admits to abdominal discomfort, described as tightness and cramping. Worsened by eating.  Discomfort is intermittent. Pain gets to 4-5/10, reduces to 1/10. When pain is 3/10 or above, difficult to eat and she lays down until it subsides. She previously was taking Tums and pepcid with relief, but over the last 2 days it hasn't been helping as much.  Gets some low back pain which she uses a heating pad / massage.Admits to constipation, had small BM 4 days ago. Taking stool softener as needed, started 2 days ago. Has not been using Tylenol because she was told it isn't good for her liver.  Used to take gabapentin for neuropathy but has not taken it recently.  Gets nausea if she tries to eat with abdominal tightness. Takes prochlorperazine as needed with relief.  Diminished appetite, able to eat very small amounts more often during the day. Sleep improved since starting Pepcid.    Med update: Has not been taking Vit D, K, or Crestor Comes for treatment every 2 weeks.   Social: Lives at home with , has 1 daughter who lives with them. Used to work as .    6/9/25 interval hx: Seen in tx room accompanied by . She states she took 4 tab of senna Friday into Sat and had multiple episodes of loose stools. After that time, she stopped using daily senna and is taking only as needed. She is having a BM every other day. She has not used any oxycodone and is using tylenol as needed daily. Admits to low appetite, but gained a few pounds because she is forcing herself to eat.     I-STOP Ref#:[id number]

## 2025-06-10 NOTE — PHYSICAL EXAM
[General Appearance - Alert] : alert [General Appearance - In No Acute Distress] : in no acute distress [General Appearance - Well Nourished] : well nourished [General Appearance - Well Developed] : well developed [Extraocular Movements] : extraocular movements were intact [Hearing Threshold Finger Rub Not [place]] : hearing was normal [] : no respiratory distress [Respiration, Rhythm And Depth] : normal respiratory rhythm and effort [Exaggerated Use Of Accessory Muscles For Inspiration] : no accessory muscle use [Auscultation Breath Sounds / Voice Sounds] : lungs were clear to auscultation bilaterally [Heart Rate And Rhythm] : heart rate was normal and rhythm regular [Heart Sounds] : normal S1 and S2 [Murmurs] : no murmurs [Edema] : there was no peripheral edema [Bowel Sounds] : normal bowel sounds [Abnormal Walk] : normal gait [No Focal Deficits] : no focal deficits [Oriented To Time, Place, And Person] : oriented to person, place, and time [Impaired Insight] : insight and judgment were intact [Affect] : the affect was normal [Mood] : the mood was normal [FreeTextEntry1] : Firm, TTP

## 2025-06-10 NOTE — PHYSICAL EXAM
[General Appearance - Alert] : alert [General Appearance - In No Acute Distress] : in no acute distress [General Appearance - Well Nourished] : well nourished [General Appearance - Well Developed] : well developed [Extraocular Movements] : extraocular movements were intact [Hearing Threshold Finger Rub Not [place]] : hearing was normal [] : no respiratory distress [Respiration, Rhythm And Depth] : normal respiratory rhythm and effort [Exaggerated Use Of Accessory Muscles For Inspiration] : no accessory muscle use [Auscultation Breath Sounds / Voice Sounds] : lungs were clear to auscultation bilaterally [Heart Rate And Rhythm] : heart rate was normal and rhythm regular [Heart Sounds] : normal S1 and S2 [Murmurs] : no murmurs [Edema] : there was no peripheral edema [Bowel Sounds] : normal bowel sounds [No Focal Deficits] : no focal deficits [Abnormal Walk] : normal gait [Oriented To Time, Place, And Person] : oriented to person, place, and time [Impaired Insight] : insight and judgment were intact [Affect] : the affect was normal [Mood] : the mood was normal [FreeTextEntry1] : Firm, TTP

## 2025-06-10 NOTE — DATA REVIEWED
[FreeTextEntry1] : 	 EXAM: [id number] - US ABDOMEN LIMITED  - ORDERED BY: JUAN ROQUE   PROCEDURE DATE:  05/16/2025    INTERPRETATION:  Clinical indications: Abdominal distention. Patient is referred for paracentesis.  IMPRESSION:  Initial abdominal ultrasound demonstrates only small amount of ascites.  Paracentesis was not performed.  --- End of Report ---   	 EXAM: [id number] - PETCT SKUL-THI ONC FDG SUBS  - ORDERED BY: OLAYINKA ONEAL   PROCEDURE DATE:  01/02/2025    INTERPRETATION:  CLINICAL INFORMATION: Preoperative evaluation of cholangiocarcinoma status post neoadjuvant chemotherapy.  TREATMENT STRATEGY EVALUATION: Subsequent AREA IMAGED: Skull base-to-thigh FASTING BLOOD SUGAR: 89 mg/dl RADIOPHARMACEUTICAL: 9.90 mCi F-18 FDG, I.V. I.V. SITE: antecubital left UPTAKE PERIOD: 52 min SCANNER: [employer] Gen2 ORAL CONTRAST: Omnipaque 300 PHARMACOLOGIC INTERVENTION: None.  TECHNIQUE: Following intravenous injection of above radiopharmaceutical and uptake period, PET/CT was performed. CT protocol was optimized for PET attenuation correction and anatomic localization and was not designed to produce and cannot replace state-of-the-art diagnostic CT images with specific imaging protocols for different body parts and indications. Images were reconstructed and reviewed in axial, coronal and sagittal views and three-dimensional MIP.  The standardized uptake values (SUV) are normalized to patient body weight and indicate the highest activity concentration (SUVmax) in a given site. All image numbers refer to axial image number.  COMPARISON:  FDG PET/CT dated 5/28/2024  OTHER STUDIES USED FOR CORRELATION: CT abdomen and pelvis dated 12/24/2024 and 11/6/2024, MRI of abdomen dated 12/18/2024, and pelvic ultrasound dated 6/5/2024  FINDINGS:  HEAD/NECK: Physiologic FDG activity in the visualized portions of the brain, head, and neck. Resolution of FDG-avid right periparotid focus.  THORAX: No abnormal FDG activity. No lymphadenopathy. A Mediport is present in the right anterior chest wall with tip of catheter in superior vena cava.  LUNGS: No abnormal FDG activity. No nodule.  PLEURA/PERICARDIUM: No abnormal FDG activity. No effusion.  HEPATOBILIARY/PANCREAS: Physiologic FDG activity in the liver which demonstrates SUV mean 3.0; previously 3.3. Pneumobilia. Mild intrahepatic  biliary duct dilatation and two biliary stents extending from the right main duct to the duodenum, similar in appearance as compared to recent CT.  Difficult to delineate FDG-avid focus within the ze hepatis adjacent to biliary stent is decreased in size and is mildly decreased in metabolism (SUV 11.6; image 152; previous SUV 14.1).  FDG-avid focus in the fundus of the gallbladder, better delineated on CT, is decreased in size and is mildly decreased in metabolism (SUV 9.3; image 157; previous SUV 13.3).  SPLEEN: Physiologic FDG activity. Normal in size.  ADRENAL GLANDS: No abnormal FDG activity. No nodule.  KIDNEYS/URINARY BLADDER: Physiologic excreted FDG activity.  REPRODUCTIVE ORGANS: No abnormal FDG activity. Resolution of FDG-avid focus in fundus of uterus, with fibroids noted on interval ultrasound.  FDG-avid focus, fundus of uterus, not well delineated on CT (SUV 7.2; image 253).  ABDOMINOPELVIC LYMPH NODES/RETROPERITONEUM: No enlarged or FDG-avid lymph node.  ESOPHAGUS/STOMACH/BOWEL/PERITONEUM/MESENTERY: No abnormal FDG activity.  VESSELS: Within normal limits.  BONES/SOFT TISSUES: No abnormal FDG activity.  IMPRESSION: Compared to FDG PET/CT dated 5/28/2024:  1. FDG-avid lesion in ze hepatis adjacent to biliary stent and FDG-avid focus within the gallbladder, not well delineated on CT, are decreased in size and mildly decreased in metabolism, compatible with a partial response to interval therapy.  2. Resolution of small FDG-avid focus in right parotid region.  3. Resolution of FDG-avid focus in fundus of uterus.  4. No new lesion.  --- End of Report ---

## 2025-06-10 NOTE — ASSESSMENT
[FreeTextEntry1] : 75 yo F with:   #Cholangiocarcinoma - Tx per medonc  # Pain - Abdominal distention with indigesiton when eating 2/2 malignancy - Tylenol 500mg - 1 tab q8h prn mild-mod pain-- using about 1-2x daily - Oxycodone IR 5mg- 0.5-1 tab q4-6 hr prn severe pain -- has not needed - Pepcid/Tums daily - Heating pad/massage for low back pain - Counseled on importance of maintaining bowel regularity in light of regular opioid use.  # Decreased appetite - Gained 2 lbs since last visit - Discussed frequent, small meals every 2 hours  # Constipation - Senna as needed  # Encounter for Palliative Care - Explained the role of palliative care in enhancing quality of life in the setting of serious illness. Emotional support provided.     Follow up in 1 month. Instructed to call office with any questions or concerns.

## 2025-06-10 NOTE — DATA REVIEWED
[FreeTextEntry1] : 	 EXAM: [id number] - US ABDOMEN LIMITED  - ORDERED BY: JUAN ROQUE   PROCEDURE DATE:  05/16/2025    INTERPRETATION:  Clinical indications: Abdominal distention. Patient is referred for paracentesis.  IMPRESSION:  Initial abdominal ultrasound demonstrates only small amount of ascites.  Paracentesis was not performed.  --- End of Report ---   	 EXAM: [id number] - PETCT SKUL-THI ONC FDG SUBS  - ORDERED BY: OLAYINKA ONEAL   PROCEDURE DATE:  01/02/2025    INTERPRETATION:  CLINICAL INFORMATION: Preoperative evaluation of cholangiocarcinoma status post neoadjuvant chemotherapy.  TREATMENT STRATEGY EVALUATION: Subsequent AREA IMAGED: Skull base-to-thigh FASTING BLOOD SUGAR: 89 mg/dl RADIOPHARMACEUTICAL: 9.90 mCi F-18 FDG, I.V. I.V. SITE: antecubital left UPTAKE PERIOD: 52 min SCANNER: [employer] Gen2 ORAL CONTRAST: Omnipaque 300 PHARMACOLOGIC INTERVENTION: None.  TECHNIQUE: Following intravenous injection of above radiopharmaceutical and uptake period, PET/CT was performed. CT protocol was optimized for PET attenuation correction and anatomic localization and was not designed to produce and cannot replace state-of-the-art diagnostic CT images with specific imaging protocols for different body parts and indications. Images were reconstructed and reviewed in axial, coronal and sagittal views and three-dimensional MIP.  The standardized uptake values (SUV) are normalized to patient body weight and indicate the highest activity concentration (SUVmax) in a given site. All image numbers refer to axial image number.  COMPARISON:  FDG PET/CT dated 5/28/2024  OTHER STUDIES USED FOR CORRELATION: CT abdomen and pelvis dated 12/24/2024 and 11/6/2024, MRI of abdomen dated 12/18/2024, and pelvic ultrasound dated 6/5/2024  FINDINGS:  HEAD/NECK: Physiologic FDG activity in the visualized portions of the brain, head, and neck. Resolution of FDG-avid right periparotid focus.  THORAX: No abnormal FDG activity. No lymphadenopathy. A Mediport is present in the right anterior chest wall with tip of catheter in superior vena cava.  LUNGS: No abnormal FDG activity. No nodule.  PLEURA/PERICARDIUM: No abnormal FDG activity. No effusion.  HEPATOBILIARY/PANCREAS: Physiologic FDG activity in the liver which demonstrates SUV mean 3.0; previously 3.3. Pneumobilia. Mild intrahepatic  biliary duct dilatation and two biliary stents extending from the right main duct to the duodenum, similar in appearance as compared to recent CT.  Difficult to delineate FDG-avid focus within the ze hepatis adjacent to biliary stent is decreased in size and is mildly decreased in metabolism (SUV 11.6; image 152; previous SUV 14.1).  FDG-avid focus in the fundus of the gallbladder, better delineated on CT, is decreased in size and is mildly decreased in metabolism (SUV 9.3; image 157; previous SUV 13.3).  SPLEEN: Physiologic FDG activity. Normal in size.  ADRENAL GLANDS: No abnormal FDG activity. No nodule.  KIDNEYS/URINARY BLADDER: Physiologic excreted FDG activity.  REPRODUCTIVE ORGANS: No abnormal FDG activity. Resolution of FDG-avid focus in fundus of uterus, with fibroids noted on interval ultrasound.  FDG-avid focus, fundus of uterus, not well delineated on CT (SUV 7.2; image 253).  ABDOMINOPELVIC LYMPH NODES/RETROPERITONEUM: No enlarged or FDG-avid lymph node.  ESOPHAGUS/STOMACH/BOWEL/PERITONEUM/MESENTERY: No abnormal FDG activity.  VESSELS: Within normal limits.  BONES/SOFT TISSUES: No abnormal FDG activity.  IMPRESSION: Compared to FDG PET/CT dated 5/28/2024:  1. FDG-avid lesion in ze hepatis adjacent to biliary stent and FDG-avid focus within the gallbladder, not well delineated on CT, are decreased in size and mildly decreased in metabolism, compatible with a partial response to interval therapy.  2. Resolution of small FDG-avid focus in right parotid region.  3. Resolution of FDG-avid focus in fundus of uterus.  4. No new lesion.  --- End of Report ---

## 2025-06-20 NOTE — PLAN
[FreeTextEntry1] : 75 yo female with h/o cholangiocarcinoma, thrombocytopenia with plts of 19 today presents with  bleeding. also reports bruising of skin - Plts too low to consider EMB at this time - will consult with oncologist to discuss optimal time/plt count for EMB - pelvic sonogram   bleeding precautions given  d/w Joy Ny and Chucho Bradshaw PA

## 2025-06-20 NOTE — HISTORY OF PRESENT ILLNESS
[FreeTextEntry1] : 73 yo  female h/o fibroid UT and cholangiocarcinoma with metastasis and thrombocytopenia (plts 19 on 6/19/25) with plan for platelet transfusion today and tomorrow presents with painless  bleeding since wednesday  Sono 4/2025 Impression   Retroverted uterus. Thin endometrium with trace fluid  noted within.  Multiple fibroids are stable  Normal right ovary.  Left ovarian simple cyst  is stable  Newly visualized moderate amount free fluid in cul de  sac. Recommend follow up  EE 2.18

## 2025-06-20 NOTE — PHYSICAL EXAM
[Vulvar Atrophy] : vulvar atrophy [Labia Majora] : normal [Labia Minora] : normal [Atrophy] : atrophy [Normal] : normal [Uterine Adnexae] : normal [FreeTextEntry4] : 5-10cc blood noted in vault no active bleeding from cervix

## 2025-06-23 NOTE — REVIEW OF SYSTEMS
[Diarrhea: Grade 0] : Diarrhea: Grade 0 [Negative] : Allergic/Immunologic [Fever] : no fever [FreeTextEntry9] : back pain

## 2025-06-23 NOTE — PHYSICAL EXAM
[Restricted in physically strenuous activity but ambulatory and able to carry out work of a light or sedentary nature] : Status 1- Restricted in physically strenuous activity but ambulatory and able to carry out work of a light or sedentary nature, e.g., light house work, office work [Normal] : affect appropriate [de-identified] : softly distended, NT to palpation

## 2025-06-23 NOTE — PHYSICAL EXAM
[Restricted in physically strenuous activity but ambulatory and able to carry out work of a light or sedentary nature] : Status 1- Restricted in physically strenuous activity but ambulatory and able to carry out work of a light or sedentary nature, e.g., light house work, office work [Normal] : affect appropriate [de-identified] : softly distended, NT to palpation

## 2025-06-23 NOTE — HISTORY OF PRESENT ILLNESS
[Disease: _____________________] : Disease: [unfilled] [de-identified] : 5/2024-Patient presented to [facility] with elevated LFTs. At that time, she also noted progressively darkening urine and pale stools for one week along with postprandial epigastric pain. 5/10/2024-Abdominal ultrasound-moderate intrahepatic and extrahepatic biliary dilatation.  Abnormal appearing gallbladder.  Abnormal soft tissue density within the distal portion of the common bile duct and at the ze hepatitis.  The constellation of findings is most concerning for possible gallbladder neoplasm with extension to the ze habitus and associated biliary obstruction.  5/11/2024-CT abdomen/pelvis-gallbladder mass and soft tissue within the ze hepatis with enlarged portal caval node.  Soft tissue involvement of the biliary ducts and vasculature.  No evidence of metastatic disease within the chest. 5/11/2024 MR/MRCP-there is an approximately 2.2 cm obstructing mass centered at the bifurcation of the common hepatic duct with intrahepatic biliary duct dilatation, highly suspicious for cholangiocarcinoma ([place]kin tumor).  Masslike appearance of the gallbladder fundus with cystic changes measuring approximately 3.3 cm, which could represent either masslike adenoma I will mitosis versus a separate gallbladder mass.  No evidence of metastatic disease within the abdomen.  5/14/2024-EUS/ERCP 5/14/2024 (Dr. Dangelo) - proximal biliary mass without involvement of the portal vein, large ze hepatic LAD & gallbladder lesion noted - ERCP notable for hilar stricture s/p sphincterotomy, dilation of the stricture to 6 mm, brushing, biopsy, cholangioscopy & PLASTIC stent placed. *repeat in 3 months for stent removal/exchange if no sx done - cholangioscopy notable for abnormal mucosa of the proximal bile duct w/ narrowing and nodularity w/ decreased vascular pattern s/p spy bite bx ** biopsy of CBD structure c/w carcinoma, favor adeno Common bile duct stricture biopsy-small clusters of atypical cells, detached were within stroma, compatible with carcinoma and favor adenocarcinoma. Bile duct mass biopsy-minute fragments of fibrous tissue with marked crush artifact and rare, atypical cells singly or in small clusters.  5/28/2024-PET/CT scan- FDG-avid soft tissue within the ze hepatis, surrounding common bile duct stent, extending into gallbladder/gallbladder fossa, corresponds to known malignancy. FDG-avid soft tissue nodule in right parotid gland is nonspecific. Differential diagnosis includes benign and malignant salivary gland neoplasms and an intraparotid lymph node. Further evaluation may be performed with ultrasound and percutaneous needle biopsy. Indeterminate FDG-avid focus in fundus of uterus. Pelvic ultrasound/pelvic MRI may be obtained for further evaluation.  6/10/2024-Started neoadjuvant Gemcitabine/Cisplatin/Durvalumab.  Course complicated by biliary stent issues, Klebsiella bacteremia (7/1/2024).  7/2/2024-CT abdomen/pelvis-IMPRESSION: Adequate positioning of the biliary stent, however there is no  pneumobilia to suggest patency. Mild intrahepatic biliary ductal  dilatation. New from 6/4/2024 are ill-defined hypodensities scattered throughout the  bilateral hepatic lobes, suggestive of metastatic disease.  7/3/24-MRI Abdomen-IMPRESSION: 1.  Focal stricture of the common hepatic duct/common biliary duct  measuring 1.6 cm with progressive enhancement, consistent with  cholangiocarcinoma. CBD stent traverse through the focal stricture. Mild  intra and extrahepatic biliary ductal dilatation. 2.  Complex solid and cystic lesion in the gallbladder fossa with areas  of thickened septations/nodular enhancement. 3.  Multiple subcentimeter T2 hyperintense foci throughout the hepatic  parenchyma and few of which in the right hepatic lobe demonstrating  peripheral rim enhancement. Although nonspecific, these are suspicious  for microabscesses.  9/4/2024-MRI Abdomen-Resolution of right hepatic lesions, in keeping with infection. Bile duct mass, with associated common hepatic duct narrowing, without change. Slightly increased CBD dilatation with distal tapering. Unchanged intrahepatic biliary duct dilatation. Fundal gallbladder mass, slightly decreased in size. 11/6/2024-CT A/P-Fundal gallbladder mass is slightly decreased in size since 9/4/2024. CBD dilatation, unchanged. Known CBD mass is difficult to visualize on CT.  11/25/2024-Completed 8th cycle Gemcitabine/Cisplatin/Durvalumab.  12/18/2024-MRI abdomen/MRCP-. Since September 4, 2024, unchanged residual stricture at biliary ductal confluence and unchanged residual gallbladder tumor. No new suspicious finding.  1/2/2025-PET/CT scan-Compared to FDG PET/CT dated 5/28/2024: 1. FDG-avid lesion in ze hepatis adjacent to biliary stent and FDG-avid focus within the gallbladder, not well delineated on CT, are decreased in size and mildly decreased in metabolism, compatible with a partial response to interval therapy. 2. Resolution of small FDG-avid focus in right parotid region. 3. Resolution of FDG-avid focus in fundus of uterus. 4. No new lesion.  1/24/2025-Patient was scheduled for an exp lap hepatectomy & bile duct resection - however intra-operatively was noted to have positive peritoneal implants, case was aborted and biopsies taken, path: - peritoneal bx: moderately differentiated adeno - right diaphragmatic nodule: moderate to poorly differentiated adeno - peritoneal bx #2: moderately differentiated adeno PD-L1 TPS 75%, Her 2- (1+), pMMR  2/26/2025-CT C/A/P-Heterogeneity of the gallbladder fundus again noted. Multiple peritoneal implants consistent with metastatic disease. 3/3/2025-Began FOLFOX.  [de-identified] : Adenocarcinoma [de-identified] : 5/21/2024-CA 19-9=290 [de-identified] : 7/15/2024-[employer] Liquid biopsy-ZHANE, TMB=1 Muts/Mb. ARID1A and TET2 mutations.  No  diagnostic alterations for FoundationOne liquid CDX were detected. [de-identified] : Persistent vaginal bleeding-patient describes as "light." Eating small meals at a time. No N/V/D. h/o Mild feet neuropathy. No falls. No difficulty walking or dropping things. Not taking gabapentin for this. No SOB. No melena reported. Has not pursued second opinion at [facility] yet.  Notes thinks had transient hives with 2nd or 3rd cycle of [place]/Cis chemo-received benadryl pre-remainder of cycles with no adverse reaction.

## 2025-06-23 NOTE — HISTORY OF PRESENT ILLNESS
[Disease: _____________________] : Disease: [unfilled] [de-identified] : 5/2024-Patient presented to [facility] with elevated LFTs. At that time, she also noted progressively darkening urine and pale stools for one week along with postprandial epigastric pain. 5/10/2024-Abdominal ultrasound-moderate intrahepatic and extrahepatic biliary dilatation.  Abnormal appearing gallbladder.  Abnormal soft tissue density within the distal portion of the common bile duct and at the ze hepatitis.  The constellation of findings is most concerning for possible gallbladder neoplasm with extension to the ze habitus and associated biliary obstruction.  5/11/2024-CT abdomen/pelvis-gallbladder mass and soft tissue within the ze hepatis with enlarged portal caval node.  Soft tissue involvement of the biliary ducts and vasculature.  No evidence of metastatic disease within the chest. 5/11/2024 MR/MRCP-there is an approximately 2.2 cm obstructing mass centered at the bifurcation of the common hepatic duct with intrahepatic biliary duct dilatation, highly suspicious for cholangiocarcinoma ([place] tumor).  Masslike appearance of the gallbladder fundus with cystic changes measuring approximately 3.3 cm, which could represent either masslike adenoma I will mitosis versus a separate gallbladder mass.  No evidence of metastatic disease within the abdomen.  5/14/2024-EUS/ERCP 5/14/2024 (Dr. Dangelo) - proximal biliary mass without involvement of the portal vein, large ze hepatic LAD & gallbladder lesion noted - ERCP notable for hilar stricture s/p sphincterotomy, dilation of the stricture to 6 mm, brushing, biopsy, cholangioscopy & PLASTIC stent placed. *repeat in 3 months for stent removal/exchange if no sx done - cholangioscopy notable for abnormal mucosa of the proximal bile duct w/ narrowing and nodularity w/ decreased vascular pattern s/p spy bite bx ** biopsy of CBD structure c/w carcinoma, favor adeno Common bile duct stricture biopsy-small clusters of atypical cells, detached were within stroma, compatible with carcinoma and favor adenocarcinoma. Bile duct mass biopsy-minute fragments of fibrous tissue with marked crush artifact and rare, atypical cells singly or in small clusters.  5/28/2024-PET/CT scan- FDG-avid soft tissue within the ze hepatis, surrounding common bile duct stent, extending into gallbladder/gallbladder fossa, corresponds to known malignancy. FDG-avid soft tissue nodule in right parotid gland is nonspecific. Differential diagnosis includes benign and malignant salivary gland neoplasms and an intraparotid lymph node. Further evaluation may be performed with ultrasound and percutaneous needle biopsy. Indeterminate FDG-avid focus in fundus of uterus. Pelvic ultrasound/pelvic MRI may be obtained for further evaluation.  6/10/2024-Started neoadjuvant Gemcitabine/Cisplatin/Durvalumab.  Course complicated by biliary stent issues, Klebsiella bacteremia (7/1/2024).  7/2/2024-CT abdomen/pelvis-IMPRESSION: Adequate positioning of the biliary stent, however there is no  pneumobilia to suggest patency. Mild intrahepatic biliary ductal  dilatation. New from 6/4/2024 are ill-defined hypodensities scattered throughout the  bilateral hepatic lobes, suggestive of metastatic disease.  7/3/24-MRI Abdomen-IMPRESSION: 1.  Focal stricture of the common hepatic duct/common biliary duct  measuring 1.6 cm with progressive enhancement, consistent with  cholangiocarcinoma. CBD stent traverse through the focal stricture. Mild  intra and extrahepatic biliary ductal dilatation. 2.  Complex solid and cystic lesion in the gallbladder fossa with areas  of thickened septations/nodular enhancement. 3.  Multiple subcentimeter T2 hyperintense foci throughout the hepatic  parenchyma and few of which in the right hepatic lobe demonstrating  peripheral rim enhancement. Although nonspecific, these are suspicious  for microabscesses.  9/4/2024-MRI Abdomen-Resolution of right hepatic lesions, in keeping with infection. Bile duct mass, with associated common hepatic duct narrowing, without change. Slightly increased CBD dilatation with distal tapering. Unchanged intrahepatic biliary duct dilatation. Fundal gallbladder mass, slightly decreased in size. 11/6/2024-CT A/P-Fundal gallbladder mass is slightly decreased in size since 9/4/2024. CBD dilatation, unchanged. Known CBD mass is difficult to visualize on CT.  11/25/2024-Completed 8th cycle Gemcitabine/Cisplatin/Durvalumab.  12/18/2024-MRI abdomen/MRCP-. Since September 4, 2024, unchanged residual stricture at biliary ductal confluence and unchanged residual gallbladder tumor. No new suspicious finding.  1/2/2025-PET/CT scan-Compared to FDG PET/CT dated 5/28/2024: 1. FDG-avid lesion in ze hepatis adjacent to biliary stent and FDG-avid focus within the gallbladder, not well delineated on CT, are decreased in size and mildly decreased in metabolism, compatible with a partial response to interval therapy. 2. Resolution of small FDG-avid focus in right parotid region. 3. Resolution of FDG-avid focus in fundus of uterus. 4. No new lesion.  1/24/2025-Patient was scheduled for an exp lap hepatectomy & bile duct resection - however intra-operatively was noted to have positive peritoneal implants, case was aborted and biopsies taken, path: - peritoneal bx: moderately differentiated adeno - right diaphragmatic nodule: moderate to poorly differentiated adeno - peritoneal bx #2: moderately differentiated adeno PD-L1 TPS 75%, Her 2- (1+), pMMR  2/26/2025-CT C/A/P-Heterogeneity of the gallbladder fundus again noted. Multiple peritoneal implants consistent with metastatic disease. 3/3/2025-Began FOLFOX.  [de-identified] : Adenocarcinoma [de-identified] : 5/21/2024-CA [id number] [de-identified] : 7/15/2024-[facility] Liquid biopsy-ZHANE, TMB=1 Muts/Mb. ARID1A and TET2 mutations.  No  diagnostic alterations for FoundationOne liquid CDX were detected. [de-identified] : Persistent vaginal bleeding-patient describes as "light." Eating small meals at a time. No N/V/D. h/o Mild feet neuropathy. No falls. No difficulty walking or dropping things. Not taking gabapentin for this. No SOB. No melena reported. Has not pursued second opinion at [facility] yet.  Notes thinks had transient hives with 2nd or 3rd cycle of [place]/Cis chemo-received benadryl pre-remainder of cycles with no adverse reaction.

## 2025-06-23 NOTE — ASSESSMENT
[FreeTextEntry1] : Lab work reviewed. # GB carcinoma-T4N1(Stage IIIC) clinically. 5/28/2024-PET/CT scan-. FDG-avid soft tissue within the ze hepatis, surrounding common bile duct stent, extending into gallbladder/gallbladder fossa, corresponds to known malignancy. FDG-avid soft tissue nodule in right parotid gland is nonspecific. Differential diagnosis includes benign and malignant salivary gland neoplasms and an intraparotid lymph node. Further evaluation may be performed with ultrasound and percutaneous needle biopsy. Indeterminate FDG-avid focus in fundus of uterus. Pelvic ultrasound/pelvic MRI may be obtained for further evaluation. -**Requested [facility] One, PD-L1 on pathology-insufficient tissue. 7/15/2024-[facility] Liquid biopsy-ZHANE, TMB=1 Muts/Mb. ARID1A and TET2 mutations.  No  diagnostic alterations for [employer] liquid CDX were detected. --had reviewed roles of surgery/radiation/systemic therapy in biliary tract cancers-requires a multidisciplinary approach.  5/30/2024-Had discussed case with surgeon Dr. Allison.  He recommended neoadjuvant systemic therapy with interval follow-up imaging at 2 months, and pending this, again at 4 months for surgical decisions.  6/10/2024-Started neoadjuvant Gemcitabine/Cisplatin/Durvalumab.  Course complicated by biliary stent issues, Klebsiella bacteremia. Following treatment of infection, resumed treatment.  With creatinine improved, resumed cisplatin.  Needed to further elucidate liver lesions: 7/2/2024-CT abdomen/pelvis-IMPRESSION: Adequate positioning of the biliary stent, however there is no  pneumobilia to suggest patency. Mild intrahepatic biliary ductal  dilatation. New from 6/4/2024 are ill-defined hypodensities scattered throughout the  bilateral hepatic lobes, suggestive of metastatic disease.  7/3/24-MRI Abdomen-IMPRESSION: 1.  Focal stricture of the common hepatic duct/common biliary duct  measuring 1.6 cm with progressive enhancement, consistent with  cholangiocarcinoma. CBD stent traverse through the focal stricture. Mild  intra and extrahepatic biliary ductal dilatation. 2.  Complex solid and cystic lesion in the gallbladder fossa with areas  of thickened septations/nodular enhancement. 3.  Multiple subcentimeter T2 hyperintense foci throughout the hepatic  parenchyma and few of which in the right hepatic lobe demonstrating  peripheral rim enhancement. Although nonspecific, these are suspicious  for microabscesses.  --?Micro abscesses vs. mets 7/18/2024-MRI Abdomen-1.  Several small right hepatic lobe liver lesions are without significant change from 7/3/2024. While favored to be inflammatory from prior cholangitis (improvement on imaging can lag behind clinical improvement), metastatic disease remains possible. Follow-up MRI abdomen recommended in 6-8 weeks. 2.  Bile duct mass and gallbladder mass are without significant change. 3.  Iron deposition within the liver. 9/4/2024-MRI Abdomen-Resolution of right hepatic lesions, in keeping with infection. Bile duct mass, with associated common hepatic duct narrowing, without change. Slightly increased CBD dilatation with distal tapering. Unchanged intrahepatic biliary duct dilatation. Fundal gallbladder mass, slightly decreased in size. 11/6/2024-CT A/P-Fundal gallbladder mass is slightly decreased in size since 9/4/2024. CBD dilatation, unchanged. Known CBD mass is difficult to visualize on CT.  11/25/2024-Completed 8th cycle Gemcitabine/Cisplatin/Durvalumab.  12/18/2024-MRI abdomen/MRCP-Since September 4, 2024, unchanged residual stricture at biliary ductal confluence and unchanged residual gallbladder tumor. No new suspicious finding. 1/2/2025-PET/CT scan-Compared to FDG PET/CT dated 5/28/2024: 1. FDG-avid lesion in ze hepatis adjacent to biliary stent and FDG-avid focus within the gallbladder, not well delineated on CT, are decreased in size and mildly decreased in metabolism, compatible with a partial response to interval therapy. 2. Resolution of small FDG-avid focus in right parotid region. 3. Resolution of FDG-avid focus in fundus of uterus. 4. No new lesion.  **1/24/2025-Patient was scheduled for an exp lap hepatectomy & bile duct resection - however intra-operatively was noted to have positive peritoneal implants, case was aborted and biopsies taken, path: - peritoneal bx: moderately differentiated adenocarcinoma - right diaphragmatic nodule: moderate to poorly differentiated adenocarcinoma - peritoneal bx #2: moderately differentiated adenocarcinoma PD-L1 TPS 75%, Her 2- (1+), pMMR; Foundation testing pending per path report.  2/26/2025-CT C/A/P-Heterogeneity of the gallbladder fundus again noted. Multiple peritoneal implants consistent with metastatic disease. As patient now with measurable progression of disease on imaging, recommended change in systemic therapy-to consider FOLFOX regimen.   3/3/3035-Began FOLFOX. Note, received message from GI/hepatobiliary research team at [facility] 2/26/2025-they do not have any trial options for this patient at this time. --decreased chemo dosage by ~20% due to cytopenias-patient/ were advised. --continue to monitor LFTs. 4/11/2025 Abdominal US-Known gallbladder malignancy with heterogeneous and ill-defined soft tissue extending from gallbladder into the adjacent liver.   5/6/2025-Status post ERCP with prior 2 stents removed, single diffuse biliary stricture in common hepatic duct consistent with known cholangiocarcinoma, left main/intrahepatic duct/CBD were dilated, CBD stones status post balloon extraction and 2 plastic stents placed in left hepatic duct. Stent exchange to be done in 2 to 3 months. --course has been complicated by refractory thrombocytopenia requiring transfusional support; holding chemo today due to platelet count <75,000, persistent vaginal bleeding and need for endometrial biopsy-message sent to Dr. Bradshaw-to arrange the biopsy-see below. --f/u CT C/A/P ordered. --d/w patient/-may need t/c BMB if cytopenia(s) remain refractory as chemo is being held.   # FDG-avid soft tissue nodule in right parotid gland nonspecific on PET/CT scan 5/25/2024.  F/U right parotid US 7/30/2024-The right parotid gland is normal in size and echogenicity. Again noted is 8 x 7 x 5 mm heterogeneous predominantly isoechoic nodularity in the superficial midportion of the parotid gland, unchanged. Findings are nonspecific. Consider follow up ultrasound in 6 months. 9/2024-Saw ENT MD Dr. Cid. 9/30/2024-SALIVARY GLAND, PAROTID, RIGHT INFERIOR, US GUIDED FNA NON DIAGNOSTIC. Benign salivary gland tissue only Resolution of small FDG-avid focus in right parotid region on PET/CT scan 12/24/2024. ---continue f/u with ENT MD   # h/o indeterminate FDG-avid focus in fundus of uterus on PET/CT scan 5/25/2024. Pelvic US 7/30/2024-Leiomyomatous uterus. Subcentimeter bilateral simple ovarian cyst. Trace fundal endometrial fluid. Resolution of FDG-avid focus in fundus of uterus on 12/24/2024 PET/CT scan. --with vaginal bleeding, GYN MD Dr. Bradshaw recommends endometrial biopsy-planned for this week.  # mild neuropathy symptoms-PRN gabapentin; foot massage  Patient was given the opportunity to ask questions. Her questions have been answered to her apparent satisfaction at this time. She expressed her understanding and willingness to comply with recommended f/u.  -->FOLFOX-doses were decreased by ~20%. Cycle every 14 days. Neulasta Onpro support. Cycle #7 held today; RTO 1-2 weeks. Giving benadryl pre-oxaliplatin as did with cisplatin post an episode of hives with one of the earlier treatments with [place]-Cis (remainder of cycles without adverse reaction)-have discussed with patient/-they have concurred with plans. Treatment RN to monitor closely for s/sx. reaction as per protocol.  18-May-2022 23:04

## 2025-06-23 NOTE — ASSESSMENT
[FreeTextEntry1] : Lab work reviewed. # GB carcinoma-T4N1(Stage IIIC) clinically. 5/28/2024-PET/CT scan-. FDG-avid soft tissue within the ze hepatis, surrounding common bile duct stent, extending into gallbladder/gallbladder fossa, corresponds to known malignancy. FDG-avid soft tissue nodule in right parotid gland is nonspecific. Differential diagnosis includes benign and malignant salivary gland neoplasms and an intraparotid lymph node. Further evaluation may be performed with ultrasound and percutaneous needle biopsy. Indeterminate FDG-avid focus in fundus of uterus. Pelvic ultrasound/pelvic MRI may be obtained for further evaluation. -**Requested Foundation One, PD-L1 on pathology-insufficient tissue. 7/15/2024-[facility] Liquid biopsy-ZHANE, TMB=1 Muts/Mb. ARID1A and TET2 mutations.  No  diagnostic alterations for FoundationOne liquid CDX were detected. --had reviewed roles of surgery/radiation/systemic therapy in biliary tract cancers-requires a multidisciplinary approach.  5/30/2024-Had discussed case with surgeon Dr. Allison.  He recommended neoadjuvant systemic therapy with interval follow-up imaging at 2 months, and pending this, again at 4 months for surgical decisions.  6/10/2024-Started neoadjuvant Gemcitabine/Cisplatin/Durvalumab.  Course complicated by biliary stent issues, Klebsiella bacteremia. Following treatment of infection, resumed treatment.  With creatinine improved, resumed cisplatin.  Needed to further elucidate liver lesions: 7/2/2024-CT abdomen/pelvis-IMPRESSION: Adequate positioning of the biliary stent, however there is no  pneumobilia to suggest patency. Mild intrahepatic biliary ductal  dilatation. New from 6/4/2024 are ill-defined hypodensities scattered throughout the  bilateral hepatic lobes, suggestive of metastatic disease.  7/3/24-MRI Abdomen-IMPRESSION: 1.  Focal stricture of the common hepatic duct/common biliary duct  measuring 1.6 cm with progressive enhancement, consistent with  cholangiocarcinoma. CBD stent traverse through the focal stricture. Mild  intra and extrahepatic biliary ductal dilatation. 2.  Complex solid and cystic lesion in the gallbladder fossa with areas  of thickened septations/nodular enhancement. 3.  Multiple subcentimeter T2 hyperintense foci throughout the hepatic  parenchyma and few of which in the right hepatic lobe demonstrating  peripheral rim enhancement. Although nonspecific, these are suspicious  for microabscesses.  --?Micro abscesses vs. mets 7/18/2024-MRI Abdomen-1.  Several small right hepatic lobe liver lesions are without significant change from 7/3/2024. While favored to be inflammatory from prior cholangitis (improvement on imaging can lag behind clinical improvement), metastatic disease remains possible. Follow-up MRI abdomen recommended in 6-8 weeks. 2.  Bile duct mass and gallbladder mass are without significant change. 3.  Iron deposition within the liver. 9/4/2024-MRI Abdomen-Resolution of right hepatic lesions, in keeping with infection. Bile duct mass, with associated common hepatic duct narrowing, without change. Slightly increased CBD dilatation with distal tapering. Unchanged intrahepatic biliary duct dilatation. Fundal gallbladder mass, slightly decreased in size. 11/6/2024-CT A/P-Fundal gallbladder mass is slightly decreased in size since 9/4/2024. CBD dilatation, unchanged. Known CBD mass is difficult to visualize on CT.  11/25/2024-Completed 8th cycle Gemcitabine/Cisplatin/Durvalumab.  12/18/2024-MRI abdomen/MRCP-Since September 4, 2024, unchanged residual stricture at biliary ductal confluence and unchanged residual gallbladder tumor. No new suspicious finding. 1/2/2025-PET/CT scan-Compared to FDG PET/CT dated 5/28/2024: 1. FDG-avid lesion in ze hepatis adjacent to biliary stent and FDG-avid focus within the gallbladder, not well delineated on CT, are decreased in size and mildly decreased in metabolism, compatible with a partial response to interval therapy. 2. Resolution of small FDG-avid focus in right parotid region. 3. Resolution of FDG-avid focus in fundus of uterus. 4. No new lesion.  **1/24/2025-Patient was scheduled for an exp lap hepatectomy & bile duct resection - however intra-operatively was noted to have positive peritoneal implants, case was aborted and biopsies taken, path: - peritoneal bx: moderately differentiated adenocarcinoma - right diaphragmatic nodule: moderate to poorly differentiated adenocarcinoma - peritoneal bx #2: moderately differentiated adenocarcinoma PD-L1 TPS 75%, Her 2- (1+), pMMR; Foundation testing pending per path report.  2/26/2025-CT C/A/P-Heterogeneity of the gallbladder fundus again noted. Multiple peritoneal implants consistent with metastatic disease. As patient now with measurable progression of disease on imaging, recommended change in systemic therapy-to consider FOLFOX regimen.   3/3/3035-Began FOLFOX. Note, received message from GI/hepatobiliary research team at [facility] 2/26/2025-they do not have any trial options for this patient at this time. --decreased chemo dosage by ~20% due to cytopenias-patient/ were advised. --continue to monitor LFTs. 4/11/2025 Abdominal US-Known gallbladder malignancy with heterogeneous and ill-defined soft tissue extending from gallbladder into the adjacent liver.   5/6/2025-Status post ERCP with prior 2 stents removed, single diffuse biliary stricture in common hepatic duct consistent with known cholangiocarcinoma, left main/intrahepatic duct/CBD were dilated, CBD stones status post balloon extraction and 2 plastic stents placed in left hepatic duct. Stent exchange to be done in 2 to 3 months. --course has been complicated by refractory thrombocytopenia requiring transfusional support; holding chemo today due to platelet count <75,000, persistent vaginal bleeding and need for endometrial biopsy-message sent to Dr. Bradshaw-to arrange the biopsy-see below. --f/u CT C/A/P ordered. --d/w patient/-may need t/c BMB if cytopenia(s) remain refractory as chemo is being held.   # FDG-avid soft tissue nodule in right parotid gland nonspecific on PET/CT scan 5/25/2024.  F/U right parotid US 7/30/2024-The right parotid gland is normal in size and echogenicity. Again noted is 8 x 7 x 5 mm heterogeneous predominantly isoechoic nodularity in the superficial midportion of the parotid gland, unchanged. Findings are nonspecific. Consider follow up ultrasound in 6 months. 9/2024-Saw ENT MD Dr. Cid. 9/30/2024-SALIVARY GLAND, PAROTID, RIGHT INFERIOR, US GUIDED FNA NON DIAGNOSTIC. Benign salivary gland tissue only Resolution of small FDG-avid focus in right parotid region on PET/CT scan 12/24/2024. ---continue f/u with ENT MD   # h/o indeterminate FDG-avid focus in fundus of uterus on PET/CT scan 5/25/2024. Pelvic US 7/30/2024-Leiomyomatous uterus. Subcentimeter bilateral simple ovarian cyst. Trace fundal endometrial fluid. Resolution of FDG-avid focus in fundus of uterus on 12/24/2024 PET/CT scan. --with vaginal bleeding, GYN MD Dr. Bradshaw recommends endometrial biopsy-planned for this week.  # mild neuropathy symptoms-PRN gabapentin; foot massage  Patient was given the opportunity to ask questions. Her questions have been answered to her apparent satisfaction at this time. She expressed her understanding and willingness to comply with recommended f/u.  -->FOLFOX-doses were decreased by ~20%. Cycle every 14 days. Neulasta Onpro support. Cycle #7 held today; RTO 1-2 weeks. Giving benadryl pre-oxaliplatin as did with cisplatin post an episode of hives with one of the earlier treatments with [place]-Cis (remainder of cycles without adverse reaction)-have discussed with patient/-they have concurred with plans. Treatment RN to monitor closely for s/sx. reaction as per protocol.

## 2025-06-29 NOTE — END OF VISIT
[FreeTextEntry3] : This note was written by Marily Montez on 06/26/2025 actively solely KAYLA Lucero M.D. All medical record entries made by the Scribe were at my, KAYLA Lucero M.D. direction and personally dictated by me on 06/26/2025. I have personally reviewed the chart and agree that the record reflects my personal performance of the history, physical exam, assessment, and plan.

## 2025-06-29 NOTE — PHYSICAL EXAM
[Chaperoned Physical Exam] : A chaperone was present in the examining room during all aspects of the physical examination. [Appropriately responsive] : appropriately responsive [Alert] : alert [No Acute Distress] : no acute distress [FreeTextEntry2] : Marily Montez [FreeTextEntry1] : medical scribe

## 2025-06-29 NOTE — HISTORY OF PRESENT ILLNESS
[FreeTextEntry1] : 74 year old KATEY ENRIQUEZ pt presents for an endometrial biopsy for postmenopausal bleeding. H/o fibroids and cholangiocarcinoma with metastasis.  She presents for an EM bx due to painless PMB for 1 wk.  Plts of 19 K/uL on 6/19/25->>90 K/uL on 6/25/25 s/p plt transfusion   She denies abdominal and pelvic pain.

## 2025-06-29 NOTE — PLAN
[FreeTextEntry1] : #Endometrial Biopsy -EM thickened but w/ organized clot -I will call pt with biopsy results in 5-7 days, informed oncologist -If pathology report is normal then I recommend pt to have sonohysterogram to r/o em polyp vs. D&C hysteroscpoy .Plan is pending pathology and goals of care. -She is advised to call me if she experiences heavy vaginal bleeding, fever, chills or severe pain  RTO in 6 mos for annual or PRN for any GYN complaints [place]

## 2025-06-29 NOTE — PROCEDURE
[Endometrial Biopsy] : Endometrial biopsy [Time out performed] : Pre-procedure time out performed.  Patient's name, date of birth and procedure confirmed. [Consent Obtained] : Consent obtained [Post-Menop. Bleeding] : post-menopausal bleeding [Risks] : risks [Benefits] : benefits [Alternatives] : alternatives [Patient] : patient [Infection] : infection [Bleeding] : bleeding [Allergic Reaction] : allergic reaction [Uterine Perforation] : uterine perforation [Pain] : pain [Negative] : negative pregnancy test [Betadine] : Betadine [None] : none [Tenaculum] : Tenaculum [Easy Passage] : Easy passage [Anteverted] : anteverted [Specimen Collected] : collected [Sent to Pathology] : placed in buffered formalin and sent for pathology [ECC] : Endocervical curettage was also performed [Tolerated Well] : Patient tolerated the procedure well [No Complications] : No complications [US Guided] : Ultrasound was used to guide the endometrial biopsy [LMPDate] : PM

## 2025-07-16 NOTE — HISTORY OF PRESENT ILLNESS
[Disease: _____________________] : Disease: [unfilled] [de-identified] : 5/2024-Patient presented to [facility] with elevated LFTs. At that time, she also noted progressively darkening urine and pale stools for one week along with postprandial epigastric pain. 5/10/2024-Abdominal ultrasound-moderate intrahepatic and extrahepatic biliary dilatation.  Abnormal appearing gallbladder.  Abnormal soft tissue density within the distal portion of the common bile duct and at the ze hepatitis.  The constellation of findings is most concerning for possible gallbladder neoplasm with extension to the ze habitus and associated biliary obstruction.  5/11/2024-CT abdomen/pelvis-gallbladder mass and soft tissue within the ze hepatis with enlarged portal caval node.  Soft tissue involvement of the biliary ducts and vasculature.  No evidence of metastatic disease within the chest. 5/11/2024 MR/MRCP-there is an approximately 2.2 cm obstructing mass centered at the bifurcation of the common hepatic duct with intrahepatic biliary duct dilatation, highly suspicious for cholangiocarcinoma (Klatskin tumor).  Masslike appearance of the gallbladder fundus with cystic changes measuring approximately 3.3 cm, which could represent either masslike adenoma I will mitosis versus a separate gallbladder mass.  No evidence of metastatic disease within the abdomen.  5/14/2024-EUS/ERCP 5/14/2024 (Dr. Dangelo) - proximal biliary mass without involvement of the portal vein, large ze hepatic LAD & gallbladder lesion noted - ERCP notable for hilar stricture s/p sphincterotomy, dilation of the stricture to 6 mm, brushing, biopsy, cholangioscopy & PLASTIC stent placed. *repeat in 3 months for stent removal/exchange if no sx done - cholangioscopy notable for abnormal mucosa of the proximal bile duct w/ narrowing and nodularity w/ decreased vascular pattern s/p spy bite bx ** biopsy of CBD structure c/w carcinoma, favor adeno Common bile duct stricture biopsy-small clusters of atypical cells, detached were within stroma, compatible with carcinoma and favor adenocarcinoma. Bile duct mass biopsy-minute fragments of fibrous tissue with marked crush artifact and rare, atypical cells singly or in small clusters.  5/28/2024-PET/CT scan- FDG-avid soft tissue within the ze hepatis, surrounding common bile duct stent, extending into gallbladder/gallbladder fossa, corresponds to known malignancy. FDG-avid soft tissue nodule in right parotid gland is nonspecific. Differential diagnosis includes benign and malignant salivary gland neoplasms and an intraparotid lymph node. Further evaluation may be performed with ultrasound and percutaneous needle biopsy. Indeterminate FDG-avid focus in fundus of uterus. Pelvic ultrasound/pelvic MRI may be obtained for further evaluation.  6/10/2024-Started neoadjuvant Gemcitabine/Cisplatin/Durvalumab.  Course complicated by biliary stent issues, Klebsiella bacteremia (7/1/2024).  7/2/2024-CT abdomen/pelvis-IMPRESSION: Adequate positioning of the biliary stent, however there is no  pneumobilia to suggest patency. Mild intrahepatic biliary ductal  dilatation. New from 6/4/2024 are ill-defined hypodensities scattered throughout the  bilateral hepatic lobes, suggestive of metastatic disease.  7/3/24-MRI Abdomen-IMPRESSION: 1.  Focal stricture of the common hepatic duct/common biliary duct  measuring 1.6 cm with progressive enhancement, consistent with  cholangiocarcinoma. CBD stent traverse through the focal stricture. Mild  intra and extrahepatic biliary ductal dilatation. 2.  Complex solid and cystic lesion in the gallbladder fossa with areas  of thickened septations/nodular enhancement. 3.  Multiple subcentimeter T2 hyperintense foci throughout the hepatic  parenchyma and few of which in the right hepatic lobe demonstrating  peripheral rim enhancement. Although nonspecific, these are suspicious  for microabscesses.  9/4/2024-MRI Abdomen-Resolution of right hepatic lesions, in keeping with infection. Bile duct mass, with associated common hepatic duct narrowing, without change. Slightly increased CBD dilatation with distal tapering. Unchanged intrahepatic biliary duct dilatation. Fundal gallbladder mass, slightly decreased in size. 11/6/2024-CT A/P-Fundal gallbladder mass is slightly decreased in size since 9/4/2024. CBD dilatation, unchanged. Known CBD mass is difficult to visualize on CT.  11/25/2024-Completed 8th cycle Gemcitabine/Cisplatin/Durvalumab.  12/18/2024-MRI abdomen/MRCP-. Since September 4, 2024, unchanged residual stricture at biliary ductal confluence and unchanged residual gallbladder tumor. No new suspicious finding.  1/2/2025-PET/CT scan-Compared to FDG PET/CT dated 5/28/2024: 1. FDG-avid lesion in ze hepatis adjacent to biliary stent and FDG-avid focus within the gallbladder, not well delineated on CT, are decreased in size and mildly decreased in metabolism, compatible with a partial response to interval therapy. 2. Resolution of small FDG-avid focus in right parotid region. 3. Resolution of FDG-avid focus in fundus of uterus. 4. No new lesion.  1/24/2025-Patient was scheduled for an exp lap hepatectomy & bile duct resection - however intra-operatively was noted to have positive peritoneal implants, case was aborted and biopsies taken, path: - peritoneal bx: moderately differentiated adeno - right diaphragmatic nodule: moderate to poorly differentiated adeno - peritoneal bx #2: moderately differentiated adeno PD-L1 TPS 75%, Her 2- (1+), pMMR  2/26/2025-CT C/A/P-Heterogeneity of the gallbladder fundus again noted. Multiple peritoneal implants consistent with metastatic disease. 3/3/2025-Began FOLFOX.  6/2025-Patient hospitalized-cholangitis, vaginal bleeding s/p D & C, LLE DVT s/p IVC filter, ascites (s/p paracenteses), trasnsfused PRBC/platelets. 6/24/2025-CT A/P-IMPRESSION: 1. Omental carcinomatosis, progressed from 5/5/2025. 2. Moderate volume abdominal/pelvic ascites, increased from 5/5/2025 3. Gallbladder fundus mural irregularity/infiltration with adjacent hepatic parenchyma, not significantly changed  [de-identified] : Adenocarcinoma [de-identified] : 5/21/2024-CA 19-9=290 [de-identified] : 7/15/2024-[employer] biopsy-ZHANE, TMB=1 Muts/Mb. ARID1A and TET2 mutations.  No  diagnostic alterations for [employer] liquid CDX were detected. [de-identified] : S/P hospitalization- cholangitis- completed Zosyn 6/28-7/4 (GI deferred ERCP as CT showed stent in good position). 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 PBRCs given -last 1unit given on day of discharge for H/H 6.8/20.4-->hemoglobin 8.  LLE dvt and was started on heparin gtt, but then started having vaginal bleeding- TVUS showed endometrial thickening c/f 2nd primary. Pt couldn't tolerate the AC because of the vaginal bleeding so she got IVC filter on 7/3 (IR). Gyn Onc saw her- she went to the OR and had D&C and endometrial sampling and Mirena IUD placement on 7/7. Norethindrone stopped. Restarted on 2nd course of Zosyn 7/9 d/t recurrent fever w/ abnormal UA (UCX neg), to complete 5day total course of abx (sent with 3doses of PO Augmentin).  She was started on oxycodone 5mg q6h for pain control w/ improvement and was sent home with 2week course of Oxy  C/c/b moderate volume ascites (s/p para on 6/30 & 7/1 -cyto negative for malignant cells and again on 7/10 w/ 3.5L drained), with mild hyponatremia/hypophosphetemia repleted.  Taking oxycodone  4 x/day for abdominal pain. Vaginal bleeding improved, with only occasional spotting now. Eating small meals at a time. No N/V/D. However, discomfort from abdominal distension. Weak; can ambulate holding on to things. No falls.  No SOB. No melena reported. Has not pursued second opinion at [facility] [place] .  Notes thinks had transient hives with 2nd or 3rd cycle of [place]/Cis chemo-received benadryl pre-remainder of cycles with no adverse reaction.

## 2025-07-16 NOTE — PHYSICAL EXAM
[Restricted in physically strenuous activity but ambulatory and able to carry out work of a light or sedentary nature] : Status 1- Restricted in physically strenuous activity but ambulatory and able to carry out work of a light or sedentary nature, e.g., light house work, office work [Normal] : affect appropriate [Capable of only limited self care, confined to bed or chair more than 50% of waking hours] : Status 3- Capable of only limited self care, confined to bed or chair more than 50% of waking hours [Thin] : thin [de-identified] : +LE edema; no calf tenderness [de-identified] : distended; unable to appreciate H-S megaly [de-identified] : diffusely weak

## 2025-07-16 NOTE — REVIEW OF SYSTEMS
[Diarrhea: Grade 0] : Diarrhea: Grade 0 [Negative] : Allergic/Immunologic [Lower Ext Edema] : lower extremity edema [Fever] : no fever [[email or web address]] : back pain

## 2025-07-16 NOTE — HISTORY OF PRESENT ILLNESS
[Disease: _____________________] : Disease: [unfilled] [de-identified] : 5/2024-Patient presented to [facility] with elevated LFTs. At that time, she also noted progressively darkening urine and pale stools for one week along with postprandial epigastric pain. 5/10/2024-Abdominal ultrasound-moderate intrahepatic and extrahepatic biliary dilatation.  Abnormal appearing gallbladder.  Abnormal soft tissue density within the distal portion of the common bile duct and at the ze hepatitis.  The constellation of findings is most concerning for possible gallbladder neoplasm with extension to the ze habitus and associated biliary obstruction.  5/11/2024-CT abdomen/pelvis-gallbladder mass and soft tissue within the ze hepatis with enlarged portal caval node.  Soft tissue involvement of the biliary ducts and vasculature.  No evidence of metastatic disease within the chest. 5/11/2024 MR/MRCP-there is an approximately 2.2 cm obstructing mass centered at the bifurcation of the common hepatic duct with intrahepatic biliary duct dilatation, highly suspicious for cholangiocarcinoma (Kl[place]kin tumor).  Masslike appearance of the gallbladder fundus with cystic changes measuring approximately 3.3 cm, which could represent either masslike adenoma I will mitosis versus a separate gallbladder mass.  No evidence of metastatic disease within the abdomen.  5/14/2024-EUS/ERCP 5/14/2024 (Dr. Dangelo) - proximal biliary mass without involvement of the portal vein, large ze hepatic LAD & gallbladder lesion noted - ERCP notable for hilar stricture s/p sphincterotomy, dilation of the stricture to 6 mm, brushing, biopsy, cholangioscopy & PLASTIC stent placed. *repeat in 3 months for stent removal/exchange if no sx done - cholangioscopy notable for abnormal mucosa of the proximal bile duct w/ narrowing and nodularity w/ decreased vascular pattern s/p spy bite bx ** biopsy of CBD structure c/w carcinoma, favor adeno Common bile duct stricture biopsy-small clusters of atypical cells, detached were within stroma, compatible with carcinoma and favor adenocarcinoma. Bile duct mass biopsy-minute fragments of fibrous tissue with marked crush artifact and rare, atypical cells singly or in small clusters.  5/28/2024-PET/CT scan- FDG-avid soft tissue within the ze hepatis, surrounding common bile duct stent, extending into gallbladder/gallbladder fossa, corresponds to known malignancy. FDG-avid soft tissue nodule in right parotid gland is nonspecific. Differential diagnosis includes benign and malignant salivary gland neoplasms and an intraparotid lymph node. Further evaluation may be performed with ultrasound and percutaneous needle biopsy. Indeterminate FDG-avid focus in fundus of uterus. Pelvic ultrasound/pelvic MRI may be obtained for further evaluation.  6/10/2024-Started neoadjuvant Gemcitabine/Cisplatin/Durvalumab.  Course complicated by biliary stent issues, Klebsiella bacteremia (7/1/2024).  7/2/2024-CT abdomen/pelvis-IMPRESSION: Adequate positioning of the biliary stent, however there is no  pneumobilia to suggest patency. Mild intrahepatic biliary ductal  dilatation. New from 6/4/2024 are ill-defined hypodensities scattered throughout the  bilateral hepatic lobes, suggestive of metastatic disease.  7/3/24-MRI Abdomen-IMPRESSION: 1.  Focal stricture of the common hepatic duct/common biliary duct  measuring 1.6 cm with progressive enhancement, consistent with  cholangiocarcinoma. CBD stent traverse through the focal stricture. Mild  intra and extrahepatic biliary ductal dilatation. 2.  Complex solid and cystic lesion in the gallbladder fossa with areas  of thickened septations/nodular enhancement. 3.  Multiple subcentimeter T2 hyperintense foci throughout the hepatic  parenchyma and few of which in the right hepatic lobe demonstrating  peripheral rim enhancement. Although nonspecific, these are suspicious  for microabscesses.  9/4/2024-MRI Abdomen-Resolution of right hepatic lesions, in keeping with infection. Bile duct mass, with associated common hepatic duct narrowing, without change. Slightly increased CBD dilatation with distal tapering. Unchanged intrahepatic biliary duct dilatation. Fundal gallbladder mass, slightly decreased in size. 11/6/2024-CT A/P-Fundal gallbladder mass is slightly decreased in size since 9/4/2024. CBD dilatation, unchanged. Known CBD mass is difficult to visualize on CT.  11/25/2024-Completed 8th cycle Gemcitabine/Cisplatin/Durvalumab.  12/18/2024-MRI abdomen/MRCP-. Since September 4, 2024, unchanged residual stricture at biliary ductal confluence and unchanged residual gallbladder tumor. No new suspicious finding.  1/2/2025-PET/CT scan-Compared to FDG PET/CT dated 5/28/2024: 1. FDG-avid lesion in ze hepatis adjacent to biliary stent and FDG-avid focus within the gallbladder, not well delineated on CT, are decreased in size and mildly decreased in metabolism, compatible with a partial response to interval therapy. 2. Resolution of small FDG-avid focus in right parotid region. 3. Resolution of FDG-avid focus in fundus of uterus. 4. No new lesion.  1/24/2025-Patient was scheduled for an exp lap hepatectomy & bile duct resection - however intra-operatively was noted to have positive peritoneal implants, case was aborted and biopsies taken, path: - peritoneal bx: moderately differentiated adeno - right diaphragmatic nodule: moderate to poorly differentiated adeno - peritoneal bx #2: moderately differentiated adeno PD-L1 TPS 75%, Her 2- (1+), pMMR  2/26/2025-CT C/A/P-Heterogeneity of the gallbladder fundus again noted. Multiple peritoneal implants consistent with metastatic disease. 3/3/2025-Began FOLFOX.  6/2025-Patient hospitalized-cholangitis, vaginal bleeding s/p D & C, LLE DVT s/p IVC filter, ascites (s/p paracenteses), trasnsfused PRBC/platelets. 6/24/2025-CT A/P-IMPRESSION: 1. Omental carcinomatosis, progressed from 5/5/2025. 2. Moderate volume abdominal/pelvic ascites, increased from 5/5/2025 3. Gallbladder fundus mural irregularity/infiltration with adjacent hepatic parenchyma, not significantly changed  [de-identified] : Adenocarcinoma [de-identified] : 5/21/2024-CA 19-9=290 [de-identified] : 7/15/2024-[employer] Liquid biopsy-ZHANE, TMB=1 Muts/Mb. ARID1A and TET2 mutations.  No  diagnostic alterations for [employer] liquid CDX were detected. [de-identified] : S/P hospitalization- cholangitis- completed Zosyn 6/28-7/4 (GI deferred ERCP as CT showed stent in good position). 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 PBRCs given -last 1unit given on day of discharge for H/H 6.8/20.4-->hemoglobin 8.  LLE dvt and was started on heparin gtt, but then started having vaginal bleeding- TVUS showed endometrial thickening c/f 2nd primary. Pt couldn't tolerate the AC because of the vaginal bleeding so she got IVC filter on 7/3 (IR). Gyn Onc saw her- she went to the OR and had D&C and endometrial sampling and Mirena IUD placement on 7/7. Norethindrone stopped. Restarted on 2nd course of Zosyn 7/9 d/t recurrent fever w/ abnormal UA (UCX neg), to complete 5day total course of abx (sent with 3doses of PO Augmentin).  She was started on oxycodone 5mg q6h for pain control w/ improvement and was sent home with 2week course of Oxy  C/c/b moderate volume ascites (s/p para on 6/30 & 7/1 -cyto negative for malignant cells and again on 7/10 w/ 3.5L drained), with mild hyponatremia/hypophosphetemia repleted.  Taking oxycodone  4 x/day for abdominal pain. Vaginal bleeding improved, with only occasional spotting now. Eating small meals at a time. No N/V/D. However, discomfort from abdominal distension. Weak; can ambulate holding on to things. No falls.  No SOB. No melena reported. Has not pursued second opinion at [facility] [place] .  Notes thinks had transient hives with 2nd or 3rd cycle of [place]/Cis chemo-received benadryl pre-remainder of cycles with no adverse reaction.

## 2025-07-16 NOTE — ASSESSMENT
[FreeTextEntry1] : Lab work, pathology/cytology reports, hospital events, CT A/P report reviewed. # GB carcinoma-T4N1(Stage IIIC) clinically. 5/28/2024-PET/CT scan-. FDG-avid soft tissue within the ze hepatis, surrounding common bile duct stent, extending into gallbladder/gallbladder fossa, corresponds to known malignancy. FDG-avid soft tissue nodule in right parotid gland is nonspecific. Differential diagnosis includes benign and malignant salivary gland neoplasms and an intraparotid lymph node. Further evaluation may be performed with ultrasound and percutaneous needle biopsy. Indeterminate FDG-avid focus in fundus of uterus. Pelvic ultrasound/pelvic MRI may be obtained for further evaluation. -**Requested Foundation One, PD-L1 on pathology-insufficient tissue. 7/15/2024-FoundationOne Liquid biopsy-ZHANE, TMB=1 Muts/Mb. ARID1A and TET2 mutations.  No  diagnostic alterations for Lingohub liquid CDX were detected. --had reviewed roles of surgery/radiation/systemic therapy in biliary tract cancers-requires a multidisciplinary approach.  5/30/2024-Had discussed case with surgeon Dr. Allison.  He recommended neoadjuvant systemic therapy with interval follow-up imaging at 2 months, and pending this, again at 4 months for surgical decisions.  6/10/2024-Started neoadjuvant Gemcitabine/Cisplatin/Durvalumab.  Course complicated by biliary stent issues, Klebsiella bacteremia. Following treatment of infection, resumed treatment.  With creatinine improved, resumed cisplatin.  Needed to further elucidate liver lesions: 7/2/2024-CT abdomen/pelvis-IMPRESSION: Adequate positioning of the biliary stent, however there is no  pneumobilia to suggest patency. Mild intrahepatic biliary ductal  dilatation. New from 6/4/2024 are ill-defined hypodensities scattered throughout the  bilateral hepatic lobes, suggestive of metastatic disease.  7/3/24-MRI Abdomen-IMPRESSION: 1.  Focal stricture of the common hepatic duct/common biliary duct  measuring 1.6 cm with progressive enhancement, consistent with  cholangiocarcinoma. CBD stent traverse through the focal stricture. Mild  intra and extrahepatic biliary ductal dilatation. 2.  Complex solid and cystic lesion in the gallbladder fossa with areas  of thickened septations/nodular enhancement. 3.  Multiple subcentimeter T2 hyperintense foci throughout the hepatic  parenchyma and few of which in the right hepatic lobe demonstrating  peripheral rim enhancement. Although nonspecific, these are suspicious  for microabscesses.  --?Micro abscesses vs. mets 7/18/2024-MRI Abdomen-1.  Several small right hepatic lobe liver lesions are without significant change from 7/3/2024. While favored to be inflammatory from prior cholangitis (improvement on imaging can lag behind clinical improvement), metastatic disease remains possible. Follow-up MRI abdomen recommended in 6-8 weeks. 2.  Bile duct mass and gallbladder mass are without significant change. 3.  Iron deposition within the liver. 9/4/2024-MRI Abdomen-Resolution of right hepatic lesions, in keeping with infection. Bile duct mass, with associated common hepatic duct narrowing, without change. Slightly increased CBD dilatation with distal tapering. Unchanged intrahepatic biliary duct dilatation. Fundal gallbladder mass, slightly decreased in size. 11/6/2024-CT A/P-Fundal gallbladder mass is slightly decreased in size since 9/4/2024. CBD dilatation, unchanged. Known CBD mass is difficult to visualize on CT.  11/25/2024-Completed 8th cycle Gemcitabine/Cisplatin/Durvalumab.  12/18/2024-MRI abdomen/MRCP-Since September 4, 2024, unchanged residual stricture at biliary ductal confluence and unchanged residual gallbladder tumor. No new suspicious finding. 1/2/2025-PET/CT scan-Compared to FDG PET/CT dated 5/28/2024: 1. FDG-avid lesion in ze hepatis adjacent to biliary stent and FDG-avid focus within the gallbladder, not well delineated on CT, are decreased in size and mildly decreased in metabolism, compatible with a partial response to interval therapy. 2. Resolution of small FDG-avid focus in right parotid region. 3. Resolution of FDG-avid focus in fundus of uterus. 4. No new lesion.  **1/24/2025-Patient was scheduled for an exp lap hepatectomy & bile duct resection - however intra-operatively was noted to have positive peritoneal implants, case was aborted and biopsies taken, path: - peritoneal bx: moderately differentiated adenocarcinoma - right diaphragmatic nodule: moderate to poorly differentiated adenocarcinoma - peritoneal bx #2: moderately differentiated adenocarcinoma PD-L1 TPS 75%, Her 2- (1+), pMMR; Foundation testing pending per path report.  2/26/2025-CT C/A/P-Heterogeneity of the gallbladder fundus again noted. Multiple peritoneal implants consistent with metastatic disease. As patient now with measurable progression of disease on imaging, recommended change in systemic therapy-to consider FOLFOX regimen.   3/3/3035-Began FOLFOX. Note, received message from GI/hepatobiliary research team at [facility] 2/26/2025-they do not have any trial options for this patient at this time. --decreased chemo dosage by ~20% due to refractory cytopenias-patient/ were advised. 4/11/2025 Abdominal US-Known gallbladder malignancy with heterogeneous and ill-defined soft tissue extending from gallbladder into the adjacent liver.   5/6/2025-Status post ERCP with prior 2 stents removed, single diffuse biliary stricture in common hepatic duct consistent with known cholangiocarcinoma, left main/intrahepatic duct/CBD were dilated, CBD stones status post balloon extraction and 2 plastic stents placed in left hepatic duct. Stent exchange to be done in 2 to 3 months. --course has been complicated by refractory thrombocytopenia requiring transfusional support; vaginal bleeding and need for endometrial biopsy. 6/2025-Patient hospitalized-cholangitis, vaginal bleeding s/p D & C, LLE DVT s/p IVC filter, ascites (s/p paracenteses), transfused PRBC/platelets. --now with endometrial cancer, vaginal bleeding-has GYN f/u scheduled. With concurrent advanced GI malignancy, would consider RT if GYN concurs.  --POD. Discussed alternative treatment options with respective pro's/con's. Can consider FOLFIRI if patient's PS, blood work allows. Potential side effects reviewed including but not limited to N/V/D, cytopenias, allergic reaction. Discussed my concern regarding patient's declining performance status and her ability to tolerate further DMT.  Explained that we may need to consider best supportive care approach.  Patient and her  expressed their understanding of the serious nature of her illness.  However, they "do not want to give up" and wish for continued DMT.  While awaiting GYN oncology follow-up for her new endometrial cancer, can consider resuming 5-FU/leucovorin pending follow-up lab work, and if able to tolerate this, can add irinotecan.  If continued decline, need to reconsider BSC.  # FDG-avid soft tissue nodule in right parotid gland nonspecific on PET/CT scan 5/25/2024.  F/U right parotid US 7/30/2024-The right parotid gland is normal in size and echogenicity. Again noted is 8 x 7 x 5 mm heterogeneous predominantly isoechoic nodularity in the superficial midportion of the parotid gland, unchanged. Findings are nonspecific. Consider follow up ultrasound in 6 months. 9/2024-Saw ENT MD Dr. Cid. 9/30/2024-SALIVARY GLAND, PAROTID, RIGHT INFERIOR, US GUIDED FNA NON DIAGNOSTIC. Benign salivary gland tissue only Resolution of small FDG-avid focus in right parotid region on PET/CT scan 12/24/2024. ---continue f/u with ENT MD   # Endometrial cancer-Endometrioid carcinoma, FIGO grade 1-2. P53 expression: Normal expression. pMMR. --has Mirena IUD; s/p course of Norethindrone per GYN --discussed management options. T/C RT in light of advanced GI malignancy at this point. Patient to confer with GYN oncology for recommendations.  # LLE DVT-AC held in light of recent vaginal bleeding; s/p IVC filter (IR) 7/3/2025. -- Obtain follow-up lower extremity venous duplex study.  If progression of thrombosis, to consider cautiously resuming anticoagulation if no recurrent vaginal bleeding.  # Ascites- s/p para on 6/30 & 7/1 -cyto negative for malignant cells and again on 7/10. -- Refer to IR for next therapeutic paracentesis for patient's comfort.  Patient was given the opportunity to ask questions. Her questions have been answered to her apparent satisfaction at this time. She expressed her understanding and willingness to comply with recommended f/u.  -->schedule: 5-Fluorouracil 320 mg/m2 IV bolus day 1, followed by f-Fluorouracil 1920 mg/m2 CIV over 48 hours, along with leucovorin 320 mg/m2 IV day 1.  If tolerates this (and pending UGT1A1 status), may consider adding irinotecan 144 mg/m2 IV (note dose decreased ~20%-FOLFIRI regimen).

## 2025-07-16 NOTE — HISTORY OF PRESENT ILLNESS
[Disease: _____________________] : Disease: [unfilled] [de-identified] : 5/2024-Patient presented to [facility] with elevated LFTs. At that time, she also noted progressively darkening urine and pale stools for one week along with postprandial epigastric pain. 5/10/2024-Abdominal ultrasound-moderate intrahepatic and extrahepatic biliary dilatation.  Abnormal appearing gallbladder.  Abnormal soft tissue density within the distal portion of the common bile duct and at the ze hepatitis.  The constellation of findings is most concerning for possible gallbladder neoplasm with extension to the ze habitus and associated biliary obstruction.  5/11/2024-CT abdomen/pelvis-gallbladder mass and soft tissue within the ze hepatis with enlarged portal caval node.  Soft tissue involvement of the biliary ducts and vasculature.  No evidence of metastatic disease within the chest. 5/11/2024 MR/MRCP-there is an approximately 2.2 cm obstructing mass centered at the bifurcation of the common hepatic duct with intrahepatic biliary duct dilatation, highly suspicious for cholangiocarcinoma ([place] tumor).  Masslike appearance of the gallbladder fundus with cystic changes measuring approximately 3.3 cm, which could represent either masslike adenoma I will mitosis versus a separate gallbladder mass.  No evidence of metastatic disease within the abdomen.  5/14/2024-EUS/ERCP 5/14/2024 (Dr. Dangelo) - proximal biliary mass without involvement of the portal vein, large ze hepatic LAD & gallbladder lesion noted - ERCP notable for hilar stricture s/p sphincterotomy, dilation of the stricture to 6 mm, brushing, biopsy, cholangioscopy & PLASTIC stent placed. *repeat in 3 months for stent removal/exchange if no sx done - cholangioscopy notable for abnormal mucosa of the proximal bile duct w/ narrowing and nodularity w/ decreased vascular pattern s/p spy bite bx ** biopsy of CBD structure c/w carcinoma, favor adeno Common bile duct stricture biopsy-small clusters of atypical cells, detached were within stroma, compatible with carcinoma and favor adenocarcinoma. Bile duct mass biopsy-minute fragments of fibrous tissue with marked crush artifact and rare, atypical cells singly or in small clusters.  5/28/2024-PET/CT scan- FDG-avid soft tissue within the ze hepatis, surrounding common bile duct stent, extending into gallbladder/gallbladder fossa, corresponds to known malignancy. FDG-avid soft tissue nodule in right parotid gland is nonspecific. Differential diagnosis includes benign and malignant salivary gland neoplasms and an intraparotid lymph node. Further evaluation may be performed with ultrasound and percutaneous needle biopsy. Indeterminate FDG-avid focus in fundus of uterus. Pelvic ultrasound/pelvic MRI may be obtained for further evaluation.  6/10/2024-Started neoadjuvant Gemcitabine/Cisplatin/Durvalumab.  Course complicated by biliary stent issues, Klebsiella bacteremia (7/1/2024).  7/2/2024-CT abdomen/pelvis-IMPRESSION: Adequate positioning of the biliary stent, however there is no  pneumobilia to suggest patency. Mild intrahepatic biliary ductal  dilatation. New from 6/4/2024 are ill-defined hypodensities scattered throughout the  bilateral hepatic lobes, suggestive of metastatic disease.  7/3/24-MRI Abdomen-IMPRESSION: 1.  Focal stricture of the common hepatic duct/common biliary duct  measuring 1.6 cm with progressive enhancement, consistent with  cholangiocarcinoma. CBD stent traverse through the focal stricture. Mild  intra and extrahepatic biliary ductal dilatation. 2.  Complex solid and cystic lesion in the gallbladder fossa with areas  of thickened septations/nodular enhancement. 3.  Multiple subcentimeter T2 hyperintense foci throughout the hepatic  parenchyma and few of which in the right hepatic lobe demonstrating  peripheral rim enhancement. Although nonspecific, these are suspicious  for microabscesses.  9/4/2024-MRI Abdomen-Resolution of right hepatic lesions, in keeping with infection. Bile duct mass, with associated common hepatic duct narrowing, without change. Slightly increased CBD dilatation with distal tapering. Unchanged intrahepatic biliary duct dilatation. Fundal gallbladder mass, slightly decreased in size. 11/6/2024-CT A/P-Fundal gallbladder mass is slightly decreased in size since 9/4/2024. CBD dilatation, unchanged. Known CBD mass is difficult to visualize on CT.  11/25/2024-Completed 8th cycle Gemcitabine/Cisplatin/Durvalumab.  12/18/2024-MRI abdomen/MRCP-. Since September 4, 2024, unchanged residual stricture at biliary ductal confluence and unchanged residual gallbladder tumor. No new suspicious finding.  1/2/2025-PET/CT scan-Compared to FDG PET/CT dated 5/28/2024: 1. FDG-avid lesion in ze hepatis adjacent to biliary stent and FDG-avid focus within the gallbladder, not well delineated on CT, are decreased in size and mildly decreased in metabolism, compatible with a partial response to interval therapy. 2. Resolution of small FDG-avid focus in right parotid region. 3. Resolution of FDG-avid focus in fundus of uterus. 4. No new lesion.  1/24/2025-Patient was scheduled for an exp lap hepatectomy & bile duct resection - however intra-operatively was noted to have positive peritoneal implants, case was aborted and biopsies taken, path: - peritoneal bx: moderately differentiated adeno - right diaphragmatic nodule: moderate to poorly differentiated adeno - peritoneal bx #2: moderately differentiated adeno PD-L1 TPS 75%, Her 2- (1+), pMMR  2/26/2025-CT C/A/P-Heterogeneity of the gallbladder fundus again noted. Multiple peritoneal implants consistent with metastatic disease. 3/3/2025-Began FOLFOX.  6/2025-Patient hospitalized-cholangitis, vaginal bleeding s/p D & C, LLE DVT s/p IVC filter, ascites (s/p paracenteses), trasnsfused PRBC/platelets. 6/24/2025-CT A/P-IMPRESSION: 1. Omental carcinomatosis, progressed from 5/5/2025. 2. Moderate volume abdominal/pelvic ascites, increased from 5/5/2025 3. Gallbladder fundus mural irregularity/infiltration with adjacent hepatic parenchyma, not significantly changed  [de-identified] : Adenocarcinoma [de-identified] : 5/21/2024-CA [id number] [de-identified] : 7/15/2024-FoundationOne Liquid biopsy-ZHANE, TMB=1 Muts/Mb. ARID1A and TET2 mutations.  No  diagnostic alterations for FoundationOne liquid CDX were detected. [de-identified] : S/P hospitalization- cholangitis- completed Zosyn 6/28-7/4 (GI deferred ERCP as CT showed stent in good position). 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 PBRCs given -last 1unit given on day of discharge for H/H 6.8/20.4-->hemoglobin 8.  LLE dvt and was started on heparin gtt, but then started having vaginal bleeding- TVUS showed endometrial thickening c/f 2nd primary. Pt couldn't tolerate the AC because of the vaginal bleeding so she got IVC filter on 7/3 (IR). Gyn Onc saw her- she went to the OR and had D&C and endometrial sampling and Mirena IUD placement on 7/7. Norethindrone stopped. Restarted on 2nd course of Zosyn 7/9 d/t recurrent fever w/ abnormal UA (UCX neg), to complete 5day total course of abx (sent with 3doses of PO Augmentin).  She was started on oxycodone 5mg q6h for pain control w/ improvement and was sent home with 2week course of Oxy  C/c/b moderate volume ascites (s/p para on 6/30 & 7/1 -cyto negative for malignant cells and again on 7/10 w/ 3.5L drained), with mild hyponatremia/hypophosphetemia repleted.  Taking oxycodone  4 x/day for abdominal pain. Vaginal bleeding improved, with only occasional spotting now. Eating small meals at a time. No N/V/D. However, discomfort from abdominal distension. Weak; can ambulate holding on to things. No falls.  No SOB. No melena reported. Has not pursued second opinion [place] [facility] [place]  Notes thinks had transient hives with 2nd or 3rd cycle of Sawyer/Cis chemo-received benadryl pre-remainder of cycles with no adverse reaction.

## 2025-07-16 NOTE — PHYSICAL EXAM
[Restricted in physically strenuous activity but ambulatory and able to carry out work of a light or sedentary nature] : Status 1- Restricted in physically strenuous activity but ambulatory and able to carry out work of a light or sedentary nature, e.g., light house work, office work [Normal] : affect appropriate [Capable of only limited self care, confined to bed or chair more than 50% of waking hours] : Status 3- Capable of only limited self care, confined to bed or chair more than 50% of waking hours [Thin] : thin [de-identified] : +LE edema; no calf tenderness [de-identified] : distended; unable to appreciate H-S megaly [de-identified] : diffusely weak

## 2025-07-16 NOTE — ASSESSMENT
[FreeTextEntry1] : Lab work, pathology/cytology reports, hospital events, CT A/P report reviewed. # GB carcinoma-T4N1(Stage IIIC) clinically. 5/28/2024-PET/CT scan-. FDG-avid soft tissue within the ze hepatis, surrounding common bile duct stent, extending into gallbladder/gallbladder fossa, corresponds to known malignancy. FDG-avid soft tissue nodule in right parotid gland is nonspecific. Differential diagnosis includes benign and malignant salivary gland neoplasms and an intraparotid lymph node. Further evaluation may be performed with ultrasound and percutaneous needle biopsy. Indeterminate FDG-avid focus in fundus of uterus. Pelvic ultrasound/pelvic MRI may be obtained for further evaluation. -**Requested [employer] [facility], PD-L1 on pathology-insufficient tissue. 7/15/2024-[facility] Liquid biopsy-ZHANE, TMB=1 Muts/Mb. ARID1A and TET2 mutations.  No  diagnostic alterations for [employer] liquid CDX were detected. --had reviewed roles of surgery/radiation/systemic therapy in biliary tract cancers-requires a multidisciplinary approach.  5/30/2024-Had discussed case with surgeon Dr. Allison.  He recommended neoadjuvant systemic therapy with interval follow-up imaging at 2 months, and pending this, again at 4 months for surgical decisions.  6/10/2024-Started neoadjuvant Gemcitabine/Cisplatin/Durvalumab.  Course complicated by biliary stent issues, Klebsiella bacteremia. Following treatment of infection, resumed treatment.  With creatinine improved, resumed cisplatin.  Needed to further elucidate liver lesions: 7/2/2024-CT abdomen/pelvis-IMPRESSION: Adequate positioning of the biliary stent, however there is no  pneumobilia to suggest patency. Mild intrahepatic biliary ductal  dilatation. New from 6/4/2024 are ill-defined hypodensities scattered throughout the  bilateral hepatic lobes, suggestive of metastatic disease.  7/3/24-MRI Abdomen-IMPRESSION: 1.  Focal stricture of the common hepatic duct/common biliary duct  measuring 1.6 cm with progressive enhancement, consistent with  cholangiocarcinoma. CBD stent traverse through the focal stricture. Mild  intra and extrahepatic biliary ductal dilatation. 2.  Complex solid and cystic lesion in the gallbladder fossa with areas  of thickened septations/nodular enhancement. 3.  Multiple subcentimeter T2 hyperintense foci throughout the hepatic  parenchyma and few of which in the right hepatic lobe demonstrating  peripheral rim enhancement. Although nonspecific, these are suspicious  for microabscesses.  --?Micro abscesses vs. mets 7/18/2024-MRI Abdomen-1.  Several small right hepatic lobe liver lesions are without significant change from 7/3/2024. While favored to be inflammatory from prior cholangitis (improvement on imaging can lag behind clinical improvement), metastatic disease remains possible. Follow-up MRI abdomen recommended in 6-8 weeks. 2.  Bile duct mass and gallbladder mass are without significant change. 3.  Iron deposition within the liver. 9/4/2024-MRI Abdomen-Resolution of right hepatic lesions, in keeping with infection. Bile duct mass, with associated common hepatic duct narrowing, without change. Slightly increased CBD dilatation with distal tapering. Unchanged intrahepatic biliary duct dilatation. Fundal gallbladder mass, slightly decreased in size. 11/6/2024-CT A/P-Fundal gallbladder mass is slightly decreased in size since 9/4/2024. CBD dilatation, unchanged. Known CBD mass is difficult to visualize on CT.  11/25/2024-Completed 8th cycle Gemcitabine/Cisplatin/Durvalumab.  12/18/2024-MRI abdomen/MRCP-Since September 4, 2024, unchanged residual stricture at biliary ductal confluence and unchanged residual gallbladder tumor. No new suspicious finding. 1/2/2025-PET/CT scan-Compared to FDG PET/CT dated 5/28/2024: 1. FDG-avid lesion in ze hepatis adjacent to biliary stent and FDG-avid focus within the gallbladder, not well delineated on CT, are decreased in size and mildly decreased in metabolism, compatible with a partial response to interval therapy. 2. Resolution of small FDG-avid focus in right parotid region. 3. Resolution of FDG-avid focus in fundus of uterus. 4. No new lesion.  **1/24/2025-Patient was scheduled for an exp lap hepatectomy & bile duct resection - however intra-operatively was noted to have positive peritoneal implants, case was aborted and biopsies taken, path: - peritoneal bx: moderately differentiated adenocarcinoma - right diaphragmatic nodule: moderate to poorly differentiated adenocarcinoma - peritoneal bx #2: moderately differentiated adenocarcinoma PD-L1 TPS 75%, Her 2- (1+), pMMR; Foundation testing pending per path report.  2/26/2025-CT C/A/P-Heterogeneity of the gallbladder fundus again noted. Multiple peritoneal implants consistent with metastatic disease. As patient now with measurable progression of disease on imaging, recommended change in systemic therapy-to consider FOLFOX regimen.   3/3/3035-Began FOLFOX. Note, received message from GI/hepatobiliary research team at [facility] 2/26/2025-they do not have any trial options for this patient at this time. --decreased chemo dosage by ~20% due to refractory cytopenias-patient/ were advised. 4/11/2025 Abdominal US-Known gallbladder malignancy with heterogeneous and ill-defined soft tissue extending from gallbladder into the adjacent liver.   5/6/2025-Status post ERCP with prior 2 stents removed, single diffuse biliary stricture in common hepatic duct consistent with known cholangiocarcinoma, left main/intrahepatic duct/CBD were dilated, CBD stones status post balloon extraction and 2 plastic stents placed in left hepatic duct. Stent exchange to be done in 2 to 3 months. --course has been complicated by refractory thrombocytopenia requiring transfusional support; vaginal bleeding and need for endometrial biopsy. 6/2025-Patient hospitalized-cholangitis, vaginal bleeding s/p D & C, LLE DVT s/p IVC filter, ascites (s/p paracenteses), transfused PRBC/platelets. --now with endometrial cancer, vaginal bleeding-has GYN f/u scheduled. With concurrent advanced GI malignancy, would consider RT if GYN concurs.  --POD. Discussed alternative treatment options with respective pro's/con's. Can consider FOLFIRI if patient's PS, blood work allows. Potential side effects reviewed including but not limited to N/V/D, cytopenias, allergic reaction. Discussed my concern regarding patient's declining performance status and her ability to tolerate further DMT.  Explained that we may need to consider best supportive care approach.  Patient and her  expressed their understanding of the serious nature of her illness.  However, they "do not want to give up" and wish for continued DMT.  While awaiting GYN oncology follow-up for her new endometrial cancer, can consider resuming 5-FU/leucovorin pending follow-up lab work, and if able to tolerate this, can add irinotecan.  If continued decline, need to reconsider BSC.  # FDG-avid soft tissue nodule in right parotid gland nonspecific on PET/CT scan 5/25/2024.  F/U right parotid US 7/30/2024-The right parotid gland is normal in size and echogenicity. Again noted is 8 x 7 x 5 mm heterogeneous predominantly isoechoic nodularity in the superficial midportion of the parotid gland, unchanged. Findings are nonspecific. Consider follow up ultrasound in 6 months. 9/2024-Saw ENT MD Dr. Cid. 9/30/2024-SALIVARY GLAND, PAROTID, RIGHT INFERIOR, US GUIDED FNA NON DIAGNOSTIC. Benign salivary gland tissue only Resolution of small FDG-avid focus in right parotid region on PET/CT scan 12/24/2024. ---continue f/u with ENT MD   # Endometrial cancer-Endometrioid carcinoma, FIGO grade 1-2. P53 expression: Normal expression. pMMR. --has Mirena IUD; s/p course of Norethindrone per GYN --discussed management options. T/C RT in light of advanced GI malignancy at this point. Patient to confer with GYN oncology for recommendations.  # LLE DVT-AC held in light of recent vaginal bleeding; s/p IVC filter (IR) 7/3/2025. -- Obtain follow-up lower extremity venous duplex study.  If progression of thrombosis, to consider cautiously resuming anticoagulation if no recurrent vaginal bleeding.  # Ascites- s/p para on 6/30 & 7/1 -cyto negative for malignant cells and again on 7/10. -- Refer to IR for next therapeutic paracentesis for patient's comfort.  Patient was given the opportunity to ask questions. Her questions have been answered to her apparent satisfaction at this time. She expressed her understanding and willingness to comply with recommended f/u.  -->schedule: 5-Fluorouracil 320 mg/m2 IV bolus day 1, followed by f-Fluorouracil 1920 mg/m2 CIV over 48 hours, along with leucovorin 320 mg/m2 IV day 1.  If tolerates this (and pending UGT1A1 status), may consider adding irinotecan 144 mg/m2 IV (note dose decreased ~20%-FOLFIRI regimen).

## 2025-07-16 NOTE — PHYSICAL EXAM
[Restricted in physically strenuous activity but ambulatory and able to carry out work of a light or sedentary nature] : Status 1- Restricted in physically strenuous activity but ambulatory and able to carry out work of a light or sedentary nature, e.g., light house work, office work [Normal] : affect appropriate [Capable of only limited self care, confined to bed or chair more than 50% of waking hours] : Status 3- Capable of only limited self care, confined to bed or chair more than 50% of waking hours [Thin] : thin [de-identified] : +LE edema; no calf tenderness [de-identified] : distended; unable to appreciate H-S megaly [de-identified] : diffusely weak

## 2025-07-16 NOTE — ASSESSMENT
[FreeTextEntry1] : Lab work, pathology/cytology reports, hospital events, CT A/P report reviewed. # GB carcinoma-T4N1(Stage IIIC) clinically. 5/28/2024-PET/CT scan-. FDG-avid soft tissue within the ze hepatis, surrounding common bile duct stent, extending into gallbladder/gallbladder fossa, corresponds to known malignancy. FDG-avid soft tissue nodule in right parotid gland is nonspecific. Differential diagnosis includes benign and malignant salivary gland neoplasms and an intraparotid lymph node. Further evaluation may be performed with ultrasound and percutaneous needle biopsy. Indeterminate FDG-avid focus in fundus of uterus. Pelvic ultrasound/pelvic MRI may be obtained for further evaluation. -**Requested [employer] [facility], PD-L1 on pathology-insufficient tissue. 7/15/2024-[facility] Liquid biopsy-ZHANE, TMB=1 Muts/Mb. ARID1A and TET2 mutations.  No  diagnostic alterations for [employer] liquid CDX were detected. --had reviewed roles of surgery/radiation/systemic therapy in biliary tract cancers-requires a multidisciplinary approach.  5/30/2024-Had discussed case with surgeon Dr. Allison.  He recommended neoadjuvant systemic therapy with interval follow-up imaging at 2 months, and pending this, again at 4 months for surgical decisions.  6/10/2024-Started neoadjuvant Gemcitabine/Cisplatin/Durvalumab.  Course complicated by biliary stent issues, Klebsiella bacteremia. Following treatment of infection, resumed treatment.  With creatinine improved, resumed cisplatin.  Needed to further elucidate liver lesions: 7/2/2024-CT abdomen/pelvis-IMPRESSION: Adequate positioning of the biliary stent, however there is no  pneumobilia to suggest patency. Mild intrahepatic biliary ductal  dilatation. New from 6/4/2024 are ill-defined hypodensities scattered throughout the  bilateral hepatic lobes, suggestive of metastatic disease.  7/3/24-MRI Abdomen-IMPRESSION: 1.  Focal stricture of the common hepatic duct/common biliary duct  measuring 1.6 cm with progressive enhancement, consistent with  cholangiocarcinoma. CBD stent traverse through the focal stricture. Mild  intra and extrahepatic biliary ductal dilatation. 2.  Complex solid and cystic lesion in the gallbladder fossa with areas  of thickened septations/nodular enhancement. 3.  Multiple subcentimeter T2 hyperintense foci throughout the hepatic  parenchyma and few of which in the right hepatic lobe demonstrating  peripheral rim enhancement. Although nonspecific, these are suspicious  for microabscesses.  --?Micro abscesses vs. mets 7/18/2024-MRI Abdomen-1.  Several small right hepatic lobe liver lesions are without significant change from 7/3/2024. While favored to be inflammatory from prior cholangitis (improvement on imaging can lag behind clinical improvement), metastatic disease remains possible. Follow-up MRI abdomen recommended in 6-8 weeks. 2.  Bile duct mass and gallbladder mass are without significant change. 3.  Iron deposition within the liver. 9/4/2024-MRI Abdomen-Resolution of right hepatic lesions, in keeping with infection. Bile duct mass, with associated common hepatic duct narrowing, without change. Slightly increased CBD dilatation with distal tapering. Unchanged intrahepatic biliary duct dilatation. Fundal gallbladder mass, slightly decreased in size. 11/6/2024-CT A/P-Fundal gallbladder mass is slightly decreased in size since 9/4/2024. CBD dilatation, unchanged. Known CBD mass is difficult to visualize on CT.  11/25/2024-Completed 8th cycle Gemcitabine/Cisplatin/Durvalumab.  12/18/2024-MRI abdomen/MRCP-Since September 4, 2024, unchanged residual stricture at biliary ductal confluence and unchanged residual gallbladder tumor. No new suspicious finding. 1/2/2025-PET/CT scan-Compared to FDG PET/CT dated 5/28/2024: 1. FDG-avid lesion in ze hepatis adjacent to biliary stent and FDG-avid focus within the gallbladder, not well delineated on CT, are decreased in size and mildly decreased in metabolism, compatible with a partial response to interval therapy. 2. Resolution of small FDG-avid focus in right parotid region. 3. Resolution of FDG-avid focus in fundus of uterus. 4. No new lesion.  **1/24/2025-Patient was scheduled for an exp lap hepatectomy & bile duct resection - however intra-operatively was noted to have positive peritoneal implants, case was aborted and biopsies taken, path: - peritoneal bx: moderately differentiated adenocarcinoma - right diaphragmatic nodule: moderate to poorly differentiated adenocarcinoma - peritoneal bx #2: moderately differentiated adenocarcinoma PD-L1 TPS 75%, Her 2- (1+), pMMR; Foundation testing pending per path report.  2/26/2025-CT C/A/P-Heterogeneity of the gallbladder fundus again noted. Multiple peritoneal implants consistent with metastatic disease. As patient now with measurable progression of disease on imaging, recommended change in systemic therapy-to consider FOLFOX regimen.   3/3/3035-Began FOLFOX. Note, received message from GI/hepatobiliary research team at [facility] 2/26/2025-they do not have any trial options for this patient at this time. --decreased chemo dosage by ~20% due to refractory cytopenias-patient/ were advised. 4/11/2025 Abdominal US-Known gallbladder malignancy with heterogeneous and ill-defined soft tissue extending from gallbladder into the adjacent liver.   5/6/2025-Status post ERCP with prior 2 stents removed, single diffuse biliary stricture in common hepatic duct consistent with known cholangiocarcinoma, left main/intrahepatic duct/CBD were dilated, CBD stones status post balloon extraction and 2 plastic stents placed in left hepatic duct. Stent exchange to be done in 2 to 3 months. --course has been complicated by refractory thrombocytopenia requiring transfusional support; vaginal bleeding and need for endometrial biopsy. 6/2025-Patient hospitalized-cholangitis, vaginal bleeding s/p D & C, LLE DVT s/p IVC filter, ascites (s/p paracenteses), transfused PRBC/platelets. --now with endometrial cancer, vaginal bleeding-has GYN f/u scheduled. With concurrent advanced GI malignancy, would consider RT if GYN concurs.  --POD. Discussed alternative treatment options with respective pro's/con's. Can consider FOLFIRI if patient's PS, blood work allows. Potential side effects reviewed including but not limited to N/V/D, cytopenias, allergic reaction. Discussed my concern regarding patient's declining performance status and her ability to tolerate further DMT.  Explained that we may need to consider best supportive care approach.  Patient and her  expressed their understanding of the serious nature of her illness.  However, they "do not want to give up" and wish for continued DMT.  While awaiting GYN oncology follow-up for her new endometrial cancer, can consider resuming 5-FU/leucovorin pending follow-up lab work, and if able to tolerate this, can add irinotecan.  If continued decline, need to reconsider BSC.  # FDG-avid soft tissue nodule in right parotid gland nonspecific on PET/CT scan 5/25/2024.  F/U right parotid US 7/30/2024-The right parotid gland is normal in size and echogenicity. Again noted is 8 x 7 x 5 mm heterogeneous predominantly isoechoic nodularity in the superficial midportion of the parotid gland, unchanged. Findings are nonspecific. Consider follow up ultrasound in 6 months. 9/2024-Saw ENT MD Dr. Cid. 9/30/2024-SALIVARY GLAND, PAROTID, RIGHT INFERIOR, US GUIDED FNA NON DIAGNOSTIC. Benign salivary gland tissue only Resolution of small FDG-avid focus in right parotid region on PET/CT scan 12/24/2024. ---continue f/u with ENT MD   # Endometrial cancer-Endometrioid carcinoma, FIGO grade 1-2. P53 expression: Normal expression. pMMR. --has Mirena IUD; s/p course of Norethindrone per GYN --discussed management options. T/C RT in light of advanced GI malignancy at this point. Patient to confer with GYN oncology for recommendations.  # LLE DVT-AC held in light of recent vaginal bleeding; s/p IVC filter (IR) 7/3/2025. -- Obtain follow-up lower extremity venous duplex study.  If progression of thrombosis, to consider cautiously resuming anticoagulation if no recurrent vaginal bleeding.  # Ascites- s/p para on 6/30 & 7/1 -cyto negative for malignant cells and again on 7/10. -- Refer to IR for next therapeutic paracentesis for patient's comfort.  Patient was given the opportunity to ask questions. Her questions have been answered to her apparent satisfaction at this time. She expressed her understanding and willingness to comply with recommended f/u.  -->schedule: 5-Fluorouracil 320 mg/m2 IV bolus day 1, followed by f-Fluorouracil 1920 mg/m2 CIV over 48 hours, along with leucovorin 320 mg/m2 IV day 1.  If tolerates this (and pending UGT1A1 status), may consider adding irinotecan 144 mg/m2 IV (note dose decreased ~20%-FOLFIRI regimen).

## 2025-07-24 NOTE — PHYSICAL EXAM
Notified just now this is for d/c scripts.    [General Appearance - Alert] : alert [General Appearance - In No Acute Distress] : in no acute distress [General Appearance - Well Nourished] : well nourished [General Appearance - Well Developed] : well developed [Extraocular Movements] : extraocular movements were intact [Hearing Threshold Finger Rub Not [place]] : hearing was normal [] : no respiratory distress [Respiration, Rhythm And Depth] : normal respiratory rhythm and effort [Exaggerated Use Of Accessory Muscles For Inspiration] : no accessory muscle use [Auscultation Breath Sounds / Voice Sounds] : lungs were clear to auscultation bilaterally [Heart Rate And Rhythm] : heart rate was normal and rhythm regular [Heart Sounds] : normal S1 and S2 [Murmurs] : no murmurs [Edema] : there was no peripheral edema [Bowel Sounds] : normal bowel sounds [No Focal Deficits] : no focal deficits [Oriented To Time, Place, And Person] : oriented to person, place, and time [Impaired Insight] : insight and judgment were intact [Affect] : the affect was normal [Mood] : the mood was normal [FreeTextEntry1] : Firm, TTP

## 2025-07-24 NOTE — PHYSICAL EXAM
[General Appearance - Alert] : alert [General Appearance - In No Acute Distress] : in no acute distress [General Appearance - Well Nourished] : well nourished [General Appearance - Well Developed] : well developed [Extraocular Movements] : extraocular movements were intact [Hearing Threshold Finger Rub Not [place]] : hearing was normal [] : no respiratory distress [Respiration, Rhythm And Depth] : normal respiratory rhythm and effort [Exaggerated Use Of Accessory Muscles For Inspiration] : no accessory muscle use [Auscultation Breath Sounds / Voice Sounds] : lungs were clear to auscultation bilaterally [Heart Rate And Rhythm] : heart rate was normal and rhythm regular [Heart Sounds] : normal S1 and S2 [Murmurs] : no murmurs [Edema] : there was no peripheral edema [Bowel Sounds] : normal bowel sounds [No Focal Deficits] : no focal deficits [Oriented To Time, Place, And Person] : oriented to person, place, and time [Impaired Insight] : insight and judgment were intact [Affect] : the affect was normal [Mood] : the mood was normal [FreeTextEntry1] : Firm, TTP

## 2025-07-24 NOTE — HISTORY OF PRESENT ILLNESS
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   PMH significant for ascites, arthritis, HLD, uterine leiomyoma, nephrolithiasis, OA, osteopenia, ovarian cysts, palpitations, parotid mass, pre-DM, SOBOE, diverticulosis, L hydronephrosis,   5/2024-Patient presented to [facility] with elevated LFTs. At that time, she also noted progressively darkening urine and pale stools for one week along with postprandial epigastric pain. 5/10/2024-Abdominal ultrasound-moderate intrahepatic and extrahepatic biliary dilatation. Abnormal appearing gallbladder. Abnormal soft tissue density within the distal portion of the common bile duct and at the ze hepatitis. The constellation of findings is most concerning for possible gallbladder neoplasm with extension to the ze habitus and associated biliary obstruction.  5/11/2024-CT abdomen/pelvis-gallbladder mass and soft tissue within the ze hepatis with enlarged portal caval node. Soft tissue involvement of the biliary ducts and vasculature. No evidence of metastatic disease within the chest. 5/11/2024 MR/MRCP-there is an approximately 2.2 cm obstructing mass centered at the bifurcation of the common hepatic duct with intrahepatic biliary duct dilatation, highly suspicious for cholangiocarcinoma (Klatskin tumor). Masslike appearance of the gallbladder fundus with cystic changes measuring approximately 3.3 cm, which could represent either masslike adenoma I will mitosis versus a separate gallbladder mass. No evidence of metastatic disease within the abdomen.  Disease: Cholangiocarcinoma/GB   Pathology: Adenocarcinoma   Tumor Markers: 5/21/2024-CA 19-9=290   7/15/2024-[employer] Liquid biopsy-ZHANE, TMB=1 Muts/Mb. ARID1A and TET2 mutations. No  diagnostic alterations for [employer] liquid CDX were detected.  Interval History: S/p hospitalization (5/5/25-5/7/25). S/p ERCP 5/6 w/ stone extraction and exchange of 2 plastic stents. S/p 1unit platelet on 5/10/25. S/p C4 FOLFOX Reports feeling weak with abdominal discomfort and bloating. She is eating small amounts because gets full easily. No N/V/D. Complains of occasional constipation. She met with IR and there was not enough fluid in abd to be drained.  Takes Tylenol 500mg at night for back pain. h/o Mild feet neuropathy. No falls. No difficulty walking or dropping things. Not taking gabapentin for this. Denies chest pain, cough SOB. ----------------------------------------------------------------------------------------------------------------------------------------- Pt was referred to supportive oncology for symptom management. Accompanied by  Dejan at initial visit. She admits to abdominal discomfort, described as tightness and cramping. Worsened by eating.  Discomfort is intermittent. Pain gets to 4-5/10, reduces to 1/10. When pain is 3/10 or above, difficult to eat and she lays down until it subsides. She previously was taking Tums and pepcid with relief, but over the last 2 days it hasn't been helping as much.  Gets some low back pain which she uses a heating pad / massage.Admits to constipation, had small BM 4 days ago. Taking stool softener as needed, started 2 days ago. Has not been using Tylenol because she was told it isn't good for her liver.  Used to take gabapentin for neuropathy but has not taken it recently.  Gets nausea if she tries to eat with abdominal tightness. Takes prochlorperazine as needed with relief.  Diminished appetite, able to eat very small amounts more often during the day. Sleep improved since starting Pepcid.    Med update: Has not been taking Vit D, K, or Crestor Comes for treatment every 2 weeks.   Social: Lives at home with , has 1 daughter who lives with them. Used to work as .    6/9/25 interval hx: Seen in tx room accompanied by . She states she took 4 tab of senna Friday into Sat and had multiple episodes of loose stools. After that time, she stopped using daily senna and is taking only as needed. She is having a BM every other day. She has not used any oxycodone and is using tylenol as needed daily. Admits to low appetite, but gained a few pounds because she is forcing herself to eat.  7/21/25 interval hx:  Accompanied by  Dejan. S/P hospitalization for cholangitis- completed Zosyn 6/28-7/4 (GI deferred ERCP as CT showed stent in good position).  Also recently hospitalized and d/c 7/12 Admits to discomfort in abdomen, described as tension. Intermittent. Feels a bit worse now than upon discharge Had paracentesis Wed 7/17, removed about 2L Using stool softener as needed, had BM yesterday.  Using Oxycodone IR 5mg 3-4x daily Tylenol 500mg 2 tab once daily Feels unsteady on her feet Plan for tx this Thursday Plan to see Dr. Shen to discuss new pathology of uterine ca and tx plan Eating small portions Using gabapentin 300mg qhs which helps with neuropathy   I-STOP Ref#:[id number]

## 2025-07-24 NOTE — ASSESSMENT
[FreeTextEntry1] : 73 yo F with:   #Cholangiocarcinoma - Tx per medonc  # Pain - Abdominal distention with indigestion when eating 2/2 malignancy - Tylenol 500mg - 1 tab q8h prn mild-mod pain-- using about 1-2x daily - Oxycodone IR 5mg- 1 tab q4-6 hr prn severe pain - Will discuss with medonc to see if we can obtain US and assess window to drain given worsening of pain over last few days  - Gabapentin 300mg qhs - Pepcid daily, Tums as needed - Heating pad/massage for low back pain only if laying down for a while - Counseled on importance of maintaining bowel regularity in light of regular opioid use.  # Decreased appetite - Discussed frequent, small meals every 2 hours - Medical cannabis certification completed today. Provided cannabis education, overview of state program, and discussed adverse effects in detail. Vaporized cannabis is not preferred route of administration due to short-term and long-term risks are not fully understood. Recommend starting with 1:1 THC:CBD at low dose THC (~2mg/dose).  # Constipation - Colace/Senna as needed  # Debility - Patient is physically limited due to malignancy and its sequelae - Agreeable to outpatient PT referral  # Encounter for Palliative Care - Explained the role of palliative care in enhancing quality of life in the setting of serious illness. Emotional support provided.     Follow up in 1 month. Instructed to call office with any questions or concerns.

## 2025-07-24 NOTE — HISTORY OF PRESENT ILLNESS
[FreeTextEntry1] : 73 yo F with cholangiocarcinoma presents for follow up palliative care visit, referred by oncology.   PMH significant for ascites, arthritis, HLD, uterine leiomyoma, nephrolithiasis, OA, osteopenia, ovarian cysts, palpitations, parotid mass, pre-DM, SOBOE, diverticulosis, L hydronephrosis,   5/2024-Patient presented to [facility] with elevated LFTs. At that time, she also noted progressively darkening urine and pale stools for one week along with postprandial epigastric pain. 5/10/2024-Abdominal ultrasound-moderate intrahepatic and extrahepatic biliary dilatation. Abnormal appearing gallbladder. Abnormal soft tissue density within the distal portion of the common bile duct and at the ze hepatitis. The constellation of findings is most concerning for possible gallbladder neoplasm with extension to the ze habitus and associated biliary obstruction.  5/11/2024-CT abdomen/pelvis-gallbladder mass and soft tissue within the ze hepatis with enlarged portal caval node. Soft tissue involvement of the biliary ducts and vasculature. No evidence of metastatic disease within the chest. 5/11/2024 MR/MRCP-there is an approximately 2.2 cm obstructing mass centered at the bifurcation of the common hepatic duct with intrahepatic biliary duct dilatation, highly suspicious for cholangiocarcinoma (Klatskin tumor). Masslike appearance of the gallbladder fundus with cystic changes measuring approximately 3.3 cm, which could represent either masslike adenoma I will mitosis versus a separate gallbladder mass. No evidence of metastatic disease within the abdomen.  Disease: Cholangiocarcinoma/GB   Pathology: Adenocarcinoma   Tumor Markers: 5/21/2024-CA 19-9=290   7/15/2024-[employer] Liquid biopsy-ZHANE, TMB=1 Muts/Mb. ARID1A and TET2 mutations. No  diagnostic alterations for FoundationOne liquid CDX were detected.  Interval History: S/p hospitalization (5/5/25-5/7/25). S/p ERCP 5/6 w/ stone extraction and exchange of 2 plastic stents. S/p 1unit platelet on 5/10/25. S/p C4 FOLFOX Reports feeling weak with abdominal discomfort and bloating. She is eating small amounts because gets full easily. No N/V/D. Complains of occasional constipation. She met with IR and there was not enough fluid in abd to be drained.  Takes Tylenol 500mg at night for back pain. h/o Mild feet neuropathy. No falls. No difficulty walking or dropping things. Not taking gabapentin for this. Denies chest pain, cough SOB. ----------------------------------------------------------------------------------------------------------------------------------------- Pt was referred to supportive oncology for symptom management. Accompanied by  Dejan at initial visit. She admits to abdominal discomfort, described as tightness and cramping. Worsened by eating.  Discomfort is intermittent. Pain gets to 4-5/10, reduces to 1/10. When pain is 3/10 or above, difficult to eat and she lays down until it subsides. She previously was taking Tums and pepcid with relief, but over the last 2 days it hasn't been helping as much.  Gets some low back pain which she uses a heating pad / massage.Admits to constipation, had small BM 4 days ago. Taking stool softener as needed, started 2 days ago. Has not been using Tylenol because she was told it isn't good for her liver.  Used to take gabapentin for neuropathy but has not taken it recently.  Gets nausea if she tries to eat with abdominal tightness. Takes prochlorperazine as needed with relief.  Diminished appetite, able to eat very small amounts more often during the day. Sleep improved since starting Pepcid.    Med update: Has not been taking Vit D, K, or Crestor Comes for treatment every 2 weeks.   Social: Lives at home with , has 1 daughter who lives with them. Used to work as .    6/9/25 interval hx: Seen in tx room accompanied by . She states she took 4 tab of senna Friday into Sat and had multiple episodes of loose stools. After that time, she stopped using daily senna and is taking only as needed. She is having a BM every other day. She has not used any oxycodone and is using tylenol as needed daily. Admits to low appetite, but gained a few pounds because she is forcing herself to eat.  7/21/25 interval hx:  Accompanied by  Dejan. S/P hospitalization for cholangitis- completed Zosyn 6/28-7/4 (GI deferred ERCP as CT showed stent in good position).  Also recently hospitalized and d/c 7/12 Admits to discomfort in abdomen, described as tension. Intermittent. Feels a bit worse now than upon discharge Had paracentesis Wed 7/17, removed about 2L Using stool softener as needed, had BM yesterday.  Using Oxycodone IR 5mg 3-4x daily Tylenol 500mg 2 tab once daily Feels unsteady on her feet Plan for tx this Thursday Plan to see Dr. Shen to discuss new pathology of uterine ca and tx plan Eating small portions Using gabapentin 300mg qhs which helps with neuropathy   I-STOP Ref#:[id number]

## 2025-07-24 NOTE — DATA REVIEWED
[FreeTextEntry1] : CT C/A/P  (06/24/2025):   COMPARISON: 5/5/2025, priors  FINDINGS: CHEST: LUNGS AND LARGE AIRWAYS: Patent central airways. No confluent airspace opacities or suspicious pulmonary lesions. PLEURA: Trace dependent pleural effusions and adjacent lower lobe passive atelectasis. VESSELS: No aortic dilatation. Aortic and trace coronary artery calcification. Internal jugular catheter terminates in cavoatrial junction. HEART: Heart size is normal. No pericardial effusion. MEDIASTINUM AND DANNY: No lymphadenopathy. CHEST WALL AND LOWER NECK: Implanted RIGHT chest wall infusion port  ABDOMEN AND PELVIS: LIVER: Shrunken cirrhotic morphology, increased/developed. BILE DUCTS: Minimal/diminished intrahepatic biliary duct ectasia. In situ CBD stent and pneumobilia GALLBLADDER: Gallbladder fundus mural irregularity/infiltration with adjacent hepatic parenchyma, unchanged SPLEEN: Splenomegaly (14.2 cm max sagittal), not significantly changed. Millimeter sized hypodensities, not significantly changed and of doubtful significance. PANCREAS: Within normal limits. ADRENALS: Within normal limits. KIDNEYS/URETERS: Symmetric renal enhancement without calculi/ obstructive uropathy.  Bilateral subcentimeter hypodensities which are too small to characterize.  BLADDER: Within normal limits. REPRODUCTIVE ORGANS: Heterogeneous/myomatous uterus, unchanged  BOWEL: No bowel obstruction. Normal appendix. Diverticulosis coli without diverticulitis. PERITONEUM/RETROPERITONEUM: Moderate volume abdominal ascites, increased from 5/5/2025. Smooth enhancement and thickening of peritoneal surfaces, not significantly changed. Omental carcinomatosis, progressed from 5/5/2025 (mid LEFT omental mass 3.2 x 1.3 x 3 cm (TR x AP x CC), developed from 5/5/2025). VESSELS: Atherosclerotic changes. LYMPH NODES: No lymphadenopathy. ABDOMINAL WALL: Mild diffuse subcutaneous edema. BONES: T9 vertebral hemangioma. Minimal thoracic spondylosis. No destructive osseous abnormality  IMPRESSION: 1.  Omental carcinomatosis, progressed from 5/5/2025. 2.  Moderate volume abdominal/pelvic ascites, increased from 5/5/2025 3.  Gallbladder fundus mural irregularity/infiltration with adjacent hepatic parenchyma, not significantly changed

## 2025-07-28 NOTE — PAST MEDICAL HISTORY
[Total Preg ___] : G[unfilled] [Live Births ___] : P[unfilled]  [Abortions ___] : Abortions:[unfilled] [Postmenopausal] : The patient is postmenopausal [Menarche Age ____] : age at menarche was [unfilled] [Menopause Age____] : age at menopause was [unfilled] [unknown] : the patient is unsure of the date of her LMP [Living ___] : Living: [unfilled] [FreeTextEntry5] : D&C

## 2025-07-28 NOTE — PHYSICAL EXAM
[Chaperoned Physical Exam] : A chaperone was present in the examining room during all aspects of the physical examination. [MA] : MA [FreeTextEntry2] : Sage [Abnormal] : Abdomen: Abnormal [Normal] : Anus and perineum: Normal sphincter tone, no masses, no prolapse. [de-identified] : distended [de-identified] : minimal blood in vault [de-identified] : IUD strings visualized

## 2025-07-28 NOTE — OB HISTORY
[Total Preg ___] : : [unfilled] [Abortions ___] : [unfilled] (abortions) [Living ___] : [unfilled] (living) [unknown] : the patient is unsure of the date of her LMP [Menarche Age ____] : age at menarche was [unfilled] [Menopause  Age ____] : menopause occurred at age [unfilled]

## 2025-07-28 NOTE — DISCUSSION/SUMMARY
[FreeTextEntry1] : 73 yo with endometrial cancer, dx in setting of progressive cholangiocarcinoma  I discussed my recommendations with Ms. Otoole and her  in detail.  Continued treatment of metastatic progressive cholangiocarcinoma is the priority at this point.  I discussed that the standard of care for treatment of endometrial cancer warrants hysterectomy.  However, pursuing surgery for endometrial cancer would result in delay in chemotherapy for cholangiocarcinoma.  She currently has a Mirena IUD in place with improvement of her endometrial cancer symptoms.  I discussed that a Mirena IUD is a treatment for patients with endometrial cancer where hysterectomy is precluded for either medical comorbidities or desire for fertility.  If her vaginal bleeding worsens with Mirena IUD, would consider palliative RT as management.  She will follow up with gyn onc in 6 months.

## 2025-07-28 NOTE — HISTORY OF PRESENT ILLNESS
[FreeTextEntry1] : Referred by (GYN) Dr Rankin Heme/Onc- Dr. Manzanares   Ms. ENRIQUEZ is a 74 year old  female LMP age 55, referred from Dr. Rankin, for postmenopausal bleeding, with a new diagnosis of endometrial cancer in the setting of metastatic cholangiocarcinoma.    Patient presented to [facility] with elevated LFTs in 2024.   Further evaluation and biopsy of Common Bile Duct c/w carcinoma, favor adeno Common bile duct stricture biopsy-small clusters of atypical cells, detached were within stroma, compatible with carcinoma and favor adenocarcinoma.  Bile duct mass biopsy-minute fragments of fibrous tissue with marked crush artifact and rare, atypical cells singly or in small clusters.  2024-PET/CT scan- FDG-avid soft tissue within the ze hepatis, surrounding common bile duct stent, extending into gallbladder/gallbladder fossa, corresponds to known malignancy. FDG-avid soft tissue nodule in right parotid gland is nonspecific  Indeterminate FDG-avid focus in fundus of uterus. Pelvic ultrasound/pelvic MRI may be obtained for further evaluation.  6/10/2024-Started neoadjuvant Gemcitabine/Cisplatin/Durvalumab. Course complicated by biliary stent issues, Klebsiella bacteremia (2024).  2024-Completed 8th cycle Gemcitabine/Cisplatin/Durvalumab.  2025 - Patient was scheduled for an ELAP hepatectomy & bile duct resection - however intra-operatively was noted to have positive peritoneal implants, case was aborted and biopsies were taken, final path: - peritoneal bx: moderately differentiated adeno - right diaphragmatic nodule: moderate to poorly differentiated adeno - peritoneal bx #2: moderately differentiated adeno PD-L1 TPS 75%, Her 2- (1+), pMMR  2025 - CT C/A/P - Heterogeneity of the gallbladder fundus again noted. Multiple peritoneal implants consistent with metastatic disease. 3/3/2025 - Began FOLFOX. 2025 - Patient hospitalized for cholangitis, vaginal bleeding s/p D&C and insertion of Mirena IUD, LLE DVT s/p IVC filter, ascites (s/p paracenteses), transfused with PRBC/platelets.  2025 - CT A/P IMPRESSION: 1. Omental carcinomatosis, progressed from 2025. 2. Moderate volume abdominal/pelvic ascites, increased from 2025 3. Gallbladder fundus mural irregularity/infiltration with adjacent hepatic parenchyma, not significantly changed.   2025- TVUS showed endometrial thickening c/f 2nd primary   2025 D&C/IUD insertion- Endometrial curettings: - Endometrioid carcinoma, FIGO grade 1-2. Comment: The tumor has following biomarker profile by immunohistochemical study: P53 expression: Normal expression. MLH1 Intact nuclear expression. MSH2 Intact nuclear expression. MSH6 Intact nuclear expression. PMS2 Intact nuclear expression.  Since D&C and insertion of Mirena IUD, she reports improvement in bleeding with just spotting.  She saw Dr. Ly last week from palliative care.  She presents today to discuss further management of endometrial cancer.  PMHx- Cholangiocarcinoma, kidney stones, HLD, LLE DVT s/p IVC filter PSHx: ERCP, sphincterotomy, cholangioscopy & plastic stent placement Family hx of cancer- Father with brain cancer, diagnosed at age 72.

## 2025-07-28 NOTE — PHYSICAL EXAM
[Chaperoned Physical Exam] : A chaperone was present in the examining room during all aspects of the physical examination. [MA] : MA [FreeTextEntry2] : Sage [Abnormal] : Abdomen: Abnormal [Normal] : Anus and perineum: Normal sphincter tone, no masses, no prolapse. [de-identified] : distended [de-identified] : minimal blood in vault [de-identified] : IUD strings visualized

## 2025-07-28 NOTE — HISTORY OF PRESENT ILLNESS
[FreeTextEntry1] : Referred by (GYN) Dr Rankin Heme/Onc- Dr. Manzanares   Ms. ENRIQUEZ is a 74 year old  female LMP age 55, referred from Dr. Rankin, for postmenopausal bleeding, with a new diagnosis of endometrial cancer in the setting of metastatic cholangiocarcinoma.    Patient presented to [facility] with elevated LFTs in 2024.   Further evaluation and biopsy of Common Bile Duct c/w carcinoma, favor adeno Common bile duct stricture biopsy-small clusters of atypical cells, detached were within stroma, compatible with carcinoma and favor adenocarcinoma.  Bile duct mass biopsy-minute fragments of fibrous tissue with marked crush artifact and rare, atypical cells singly or in small clusters.  2024-PET/CT scan- FDG-avid soft tissue within the ez hepatis, surrounding common bile duct stent, extending into gallbladder/gallbladder fossa, corresponds to known malignancy. FDG-avid soft tissue nodule in right parotid gland is nonspecific  Indeterminate FDG-avid focus in fundus of uterus. Pelvic ultrasound/pelvic MRI may be obtained for further evaluation.  6/10/2024-Started neoadjuvant Gemcitabine/Cisplatin/Durvalumab. Course complicated by biliary stent issues, Klebsiella bacteremia (2024).  2024-Completed 8th cycle Gemcitabine/Cisplatin/Durvalumab.  2025 - Patient was scheduled for an ELAP hepatectomy & bile duct resection - however intra-operatively was noted to have positive peritoneal implants, case was aborted and biopsies were taken, final path: - peritoneal bx: moderately differentiated adeno - right diaphragmatic nodule: moderate to poorly differentiated adeno - peritoneal bx #2: moderately differentiated adeno PD-L1 TPS 75%, Her 2- (1+), pMMR  2025 - CT C/A/P - Heterogeneity of the gallbladder fundus again noted. Multiple peritoneal implants consistent with metastatic disease. 3/3/2025 - Began FOLFOX. 2025 - Patient hospitalized for cholangitis, vaginal bleeding s/p D&C and insertion of Mirena IUD, LLE DVT s/p IVC filter, ascites (s/p paracenteses), transfused with PRBC/platelets.  2025 - CT A/P IMPRESSION: 1. Omental carcinomatosis, progressed from 2025. 2. Moderate volume abdominal/pelvic ascites, increased from 2025 3. Gallbladder fundus mural irregularity/infiltration with adjacent hepatic parenchyma, not significantly changed.   2025- TVUS showed endometrial thickening c/f 2nd primary   2025 D&C/IUD insertion- Endometrial curettings: - Endometrioid carcinoma, FIGO grade 1-2. Comment: The tumor has following biomarker profile by immunohistochemical study: P53 expression: Normal expression. MLH1 Intact nuclear expression. MSH2 Intact nuclear expression. MSH6 Intact nuclear expression. PMS2 Intact nuclear expression.  Since D&C and insertion of Mirena IUD, she reports improvement in bleeding with just spotting.  She saw Dr. Ly last week from palliative care.  She presents today to discuss further management of endometrial cancer.  PMHx- Cholangiocarcinoma, kidney stones, HLD, LLE DVT s/p IVC filter PSHx: ERCP, sphincterotomy, cholangioscopy & plastic stent placement Family hx of cancer- Father with brain cancer, diagnosed at age 72.

## 2025-07-30 NOTE — HISTORY OF PRESENT ILLNESS
[FreeTextEntry1] : 74 year old KATEY ENRIQUEZ pt presents for f/u due to post-menopausal bleeding and endometrial ca.  Pt has cholangial ca. being treated by hematology oncology, incidentally found endometrial ca fibro stage 2. S/p D&C and Mirena IUD in situ. Referred to Dr. Shen gyn oncology. Not a surgical candidate due to cholangial carcinoma. If bleeding persists, will discuss palliative care.  Pt saw Dr. Shen two days ago 7/28/25 who recommended continued treatment of metastatic progressive cholangiocarcinoma. Pt denies of fever, chills. Pt reports of pain in upper abdomen when pressure is applied, likely due to excess fluid. Pt reports light vaginal bleeding and spotting but is not saturating pads.

## 2025-07-30 NOTE — PLAN
[FreeTextEntry1] : #PMB -Pt to contact me or Dr. Shen If bleeding increases -Pt to f/u with Dr. Shen in 6 months. -Advised to keep Mirena IUD in place to manage bleeding and sxs  #LE DVT -Pt to continue monitoring   #Hypotension: -Repeat BP   F/u with me in 6 months or if heavy bleeding persist.

## 2025-07-30 NOTE — END OF VISIT
[FreeTextEntry3] :  This note was written by Raquel Saxena 07/30/2025 on actively solely Dr. Liz Rankin M.D.   All medical record entries made by the scribe were at my, JANETH Ayala., direction and personally dictated by me on 07/30/2025. I have personally reviewed the chart and agree that the record reflects my personal performance of the history, physical exam, assessment, and plan.

## 2025-07-30 NOTE — PHYSICAL EXAM
[Appropriately responsive] : appropriately responsive [Alert] : alert [No Acute Distress] : no acute distress [FreeTextEntry2] : Raquel loja)